# Patient Record
Sex: FEMALE | Race: WHITE | NOT HISPANIC OR LATINO | Employment: OTHER | URBAN - METROPOLITAN AREA
[De-identification: names, ages, dates, MRNs, and addresses within clinical notes are randomized per-mention and may not be internally consistent; named-entity substitution may affect disease eponyms.]

---

## 2017-02-04 DIAGNOSIS — I10 ESSENTIAL HYPERTENSION: ICD-10-CM

## 2017-02-04 DIAGNOSIS — E03.9 ACQUIRED HYPOTHYROIDISM: ICD-10-CM

## 2017-02-06 RX ORDER — LEVOTHYROXINE SODIUM 100 UG/1
TABLET ORAL
Qty: 90 TABLET | Refills: 0 | Status: SHIPPED | OUTPATIENT
Start: 2017-02-06 | End: 2017-02-08 | Stop reason: SDUPTHER

## 2017-02-06 RX ORDER — AMLODIPINE BESYLATE 5 MG/1
TABLET ORAL
Qty: 90 TABLET | Refills: 0 | Status: SHIPPED | OUTPATIENT
Start: 2017-02-06 | End: 2017-05-08 | Stop reason: SDUPTHER

## 2017-02-06 RX ORDER — LISINOPRIL AND HYDROCHLOROTHIAZIDE 20; 25 MG/1; MG/1
TABLET ORAL
Qty: 90 TABLET | Refills: 0 | Status: SHIPPED | OUTPATIENT
Start: 2017-02-06 | End: 2017-05-08 | Stop reason: SDUPTHER

## 2017-02-06 NOTE — TELEPHONE ENCOUNTER
Call patient and advise her she is due for 6 months fasting labs and then office visit now - please schedule appointments

## 2017-02-07 ENCOUNTER — TELEPHONE (OUTPATIENT)
Dept: FAMILY MEDICINE | Facility: CLINIC | Age: 57
End: 2017-02-07

## 2017-02-07 NOTE — TELEPHONE ENCOUNTER
----- Message from Fran Camargo sent at 2/6/2017  3:58 PM CST -----  Contact: self/[phone]  She needs direction to the [facility].

## 2017-02-08 ENCOUNTER — OFFICE VISIT (OUTPATIENT)
Dept: FAMILY MEDICINE | Facility: CLINIC | Age: 57
End: 2017-02-08
Payer: COMMERCIAL

## 2017-02-08 VITALS
OXYGEN SATURATION: 100 % | SYSTOLIC BLOOD PRESSURE: 110 MMHG | DIASTOLIC BLOOD PRESSURE: 72 MMHG | TEMPERATURE: 98 F | WEIGHT: 209 LBS | HEART RATE: 88 BPM | HEIGHT: 62 IN | BODY MASS INDEX: 38.46 KG/M2

## 2017-02-08 DIAGNOSIS — R73.01 IFG (IMPAIRED FASTING GLUCOSE): ICD-10-CM

## 2017-02-08 DIAGNOSIS — E03.9 ACQUIRED HYPOTHYROIDISM: ICD-10-CM

## 2017-02-08 DIAGNOSIS — E78.5 HYPERLIPIDEMIA, UNSPECIFIED HYPERLIPIDEMIA TYPE: ICD-10-CM

## 2017-02-08 DIAGNOSIS — R74.8 ELEVATED LIVER ENZYMES: ICD-10-CM

## 2017-02-08 DIAGNOSIS — I10 ESSENTIAL HYPERTENSION: Primary | ICD-10-CM

## 2017-02-08 PROCEDURE — 99214 OFFICE O/P EST MOD 30 MIN: CPT | Mod: S$GLB,,, | Performed by: NURSE PRACTITIONER

## 2017-02-08 PROCEDURE — 3074F SYST BP LT 130 MM HG: CPT | Mod: S$GLB,,, | Performed by: NURSE PRACTITIONER

## 2017-02-08 PROCEDURE — 3078F DIAST BP <80 MM HG: CPT | Mod: S$GLB,,, | Performed by: NURSE PRACTITIONER

## 2017-02-08 PROCEDURE — 99999 PR PBB SHADOW E&M-EST. PATIENT-LVL III: CPT | Mod: PBBFAC,,, | Performed by: NURSE PRACTITIONER

## 2017-02-08 RX ORDER — LEVOTHYROXINE SODIUM 112 UG/1
112 TABLET ORAL
Qty: 90 TABLET | Refills: 0 | Status: SHIPPED | OUTPATIENT
Start: 2017-02-08 | End: 2017-02-08 | Stop reason: SDUPTHER

## 2017-02-08 RX ORDER — LEVOTHYROXINE SODIUM 112 UG/1
112 TABLET ORAL
Qty: 90 TABLET | Refills: 0 | Status: SHIPPED | OUTPATIENT
Start: 2017-02-08 | End: 2017-05-08 | Stop reason: SDUPTHER

## 2017-02-08 NOTE — PROGRESS NOTES
"Subjective:       Patient ID: Digna Bhatia is a 56 y.o. female.    Chief Complaint: Follow-up (lab results)    HPI Comments: Patient is here today for follow up with lab results.    Patient has Hypertension that is controlled on present medications, Amlodipine and Lisinopril HCT.  /72 (BP Location: Right arm, Patient Position: Sitting, BP Method: Manual)  Pulse 88  Temp 97.9 °F (36.6 °C) (Oral)   Ht 5' 1.5" (1.562 m)  Wt 94.8 kg (208 lb 15.9 oz)  SpO2 100%  BMI 38.85 kg/m2    Patient has Hypothyroidism.  At last visit, we had increased the Synthroid from 75 to 100 mcg and her thyroid levels have improved as well as her cholesterol levels.  See results below.    Patient has IFG - FBG continues to rise now 132 but her HgbA1C and average daily sugars are not consistent with diabetes - last HgbA1C was 5.4 in July.  Body mass index is 38.85 kg/(m^2).  Consistent with Metabolic Syndrome.    Patient has always had an elevated total bilirubin in the past but rest of liver enzymes were fine so we continued to monitor but now the AST and ALT are very slightly elevated at 47 and 48.  Advised on no Tylenol and no Alcohol and weight loss in case this is secondary to fatty liver.  Will recheck in 3 months and if still rising, will need to do further liver workup.        Component      Latest Ref Rng & Units 2/7/2017 7/21/2016 9/30/2015   Sodium      136 - 145 mmol/L 140 137 138   Potassium      3.5 - 5.1 mmol/L 3.9 4.2 4.1   Chloride      95 - 110 mmol/L 100 102 103   CO2      23 - 29 mmol/L 29 24 26   Glucose      70 - 110 mg/dL 132 (H) 126 (H) 122 (H)   BUN, Bld      7 - 17 mg/dL 20 (H) 24 (H) 17   Creatinine      0.50 - 1.40 mg/dL 0.82 0.9 0.9   Calcium      8.7 - 10.5 mg/dL 9.4 9.7 9.5   Total Protein      6.0 - 8.4 g/dL 7.4 7.0 7.2   Albumin      3.5 - 5.2 g/dL 4.4 4.0 4.0   Total Bilirubin      0.1 - 1.0 mg/dL 1.5 (H) 1.6 (H) 1.5 (H)   Alkaline Phosphatase      38 - 126 U/L 89 85 83   AST      15 - 46 U/L 47 (H) " 24 29   ALT      10 - 44 U/L 48 (H) 35 46 (H)   Anion Gap      8 - 16 mmol/L 11 11 9   eGFR if African American      >60 mL/min/1.73 m:2 >60.0 >60.0 >60.0   eGFR if non African American      >60 mL/min/1.73 m:2 >60.0 >60.0 >60.0   Cholesterol      120 - 199 mg/dL 209 (H) 231 (H) 228 (H)   Triglycerides      30 - 150 mg/dL 91 112 102   HDL      40 - 75 mg/dL 52 53 53   LDL Cholesterol      63.0 - 159.0 mg/dL 138.8 155.6 154.6   HDL/Chol Ratio      20.0 - 50.0 % 24.9 22.9 23.2   Total Cholesterol/HDL Ratio      2.0 - 5.0 4.0 4.4 4.3   Non-HDL Cholesterol      mg/dL 157 178 175   Hemoglobin A1C      4.5 - 6.2 %  5.4 5.4   Estimated Avg Glucose      68 - 131 mg/dL  108 108   TSH      0.400 - 4.000 uIU/mL 2.700 5.770 (H) 2.494   Free T4      0.71 - 1.51 ng/dL 1.09 0.92 1.02     Previous Medications    AMLODIPINE (NORVASC) 5 MG TABLET    TAKE 1 TABLET DAILY    LEVOTHYROXINE (SYNTHROID) 100 MCG TABLET    TAKE 1 TABLET DAILY    LISINOPRIL-HYDROCHLOROTHIAZIDE (PRINZIDE,ZESTORETIC) 20-25 MG TAB    TAKE 1 TABLET DAILY       Past Medical History   Diagnosis Date    H/O mammogram 3/31/2016     has done at [facility]    Hypertension     Hypothyroidism     IFG (impaired fasting glucose)        Past Surgical History   Procedure Laterality Date     section      Dilation and curettage of uterus      Colonoscopy N/A 2016     Procedure: COLONOSCOPY-Miralax split prep ;  Surgeon: Cali Oliveira Jr., MD;  Location: [facility];  Service: Endoscopy;  Laterality: N/A;       Family History   Problem Relation Age of Onset    Dementia Mother     Parkinsonism Mother      PASSED AGE 78    Hypertension Father     Cancer Father      KIDNEY PASSED AGE 70    Heart disease Father 50     HEART ATTACK    Diabetes Father     Hypertension Sister     Diabetes Brother     Hypertension Brother     Diabetes Sister     Hypertension Sister     Hypertension Sister     Hypertension Brother     No Known Problems Daughter     No  "Known Problems Son     No Known Problems Son        Social History     Social History    Marital status:      Spouse name: N/A    Number of children: N/A    Years of education: N/A     Social History Main Topics    Smoking status: Never Smoker    Smokeless tobacco: Never Used    Alcohol use Yes      Comment: rare    Drug use: No    Sexual activity: Not Asked     Other Topics Concern    None     Social History Narrative       Review of Systems   Constitutional: Negative for appetite change, chills, fatigue, fever and unexpected weight change.   HENT: Negative for congestion, ear pain, mouth sores, nosebleeds, postnasal drip, rhinorrhea, sinus pressure, sneezing, sore throat, trouble swallowing and voice change.    Eyes: Negative for photophobia, pain, discharge, redness, itching and visual disturbance.   Respiratory: Negative for cough, chest tightness and shortness of breath.    Cardiovascular: Negative for chest pain, palpitations and leg swelling.   Gastrointestinal: Negative for abdominal pain, blood in stool, constipation, diarrhea, nausea and vomiting.   Genitourinary: Negative for dysuria, frequency, hematuria and urgency.   Musculoskeletal: Negative for arthralgias, back pain, joint swelling and myalgias.   Skin: Negative for color change and rash.   Allergic/Immunologic: Negative for immunocompromised state.   Neurological: Negative for dizziness, seizures, syncope, weakness and headaches.   Hematological: Negative for adenopathy. Does not bruise/bleed easily.   Psychiatric/Behavioral: Negative for agitation, dysphoric mood, sleep disturbance and suicidal ideas. The patient is not nervous/anxious.          Objective:     Vitals:    02/08/17 1610   BP: 110/72   BP Location: Right arm   Patient Position: Sitting   BP Method: Manual   Pulse: 88   Temp: 97.9 °F (36.6 °C)   TempSrc: Oral   SpO2: 100%   Weight: 94.8 kg (208 lb 15.9 oz)   Height: 5' 1.5" (1.562 m)          Physical Exam "   Constitutional: She is oriented to person, place, and time. She appears well-developed and well-nourished.   + obesity. Body mass index is 38.85 kg/(m^2).     HENT:   Head: Normocephalic and atraumatic.   Right Ear: External ear normal.   Left Ear: External ear normal.   Nose: Nose normal.   Mouth/Throat: Oropharynx is clear and moist. No oropharyngeal exudate.   Eyes: EOM are normal. Pupils are equal, round, and reactive to light.   Neck: Normal range of motion. Neck supple. No tracheal deviation present. No thyromegaly present.   Cardiovascular: Normal rate, regular rhythm and normal heart sounds.    No murmur heard.  Pulmonary/Chest: Effort normal and breath sounds normal. No respiratory distress.   Abdominal: Soft. She exhibits no distension.   Musculoskeletal: Normal range of motion. She exhibits no edema.   Lymphadenopathy:     She has no cervical adenopathy.   Neurological: She is alert and oriented to person, place, and time. No cranial nerve deficit. Coordination normal.   Skin: Skin is warm and dry. No rash noted.   Psychiatric: She has a normal mood and affect.         Assessment:         ICD-10-CM ICD-9-CM   1. Essential hypertension I10 401.9   2. Hyperlipidemia, unspecified hyperlipidemia type E78.5 272.4   3. Acquired hypothyroidism E03.9 244.9   4. IFG (impaired fasting glucose) R73.01 790.21   5. Elevated liver enzymes R74.8 790.5       Plan:       Essential hypertension  -  Controlled on present medications.  Recheck in 3 months.  -     Comprehensive metabolic panel; Future; Expected date: 5/1/17    Hyperlipidemia, unspecified hyperlipidemia type  -  Improved from last visit.  Suspect improvement secondary to better thyroid control.  Stricter lifestyle modifications.  Need weight loss.  Suggested program like Weight Watchers.  Recheck levels in 3 months.  -     Lipid panel; Future; Expected date: 5/1/17    Acquired hypothyroidism  -  Increase the Synthroid from 100 to 112 mcg daily for a little  bit better control even though numbers are definitely improved.  Recheck in 3 months.  -     TSH; Future; Expected date: 5/1/17  -     T4, free; Future; Expected date: 5/1/17  -     levothyroxine (SYNTHROID) 112 MCG tablet; Take 1 tablet (112 mcg total) by mouth before breakfast.  Dispense: 90 tablet; Refill: 0    IFG (impaired fasting glucose)  -  Must lose weight and stricter lifestyle modifications.  -     Hemoglobin A1c; Future; Expected date: 5/1/17    Elevated liver enzymes  -  Stop tylenol and alcohol.  Must lose weight - suspect fatty liver.  Will monitor and recheck in 3 months - if levels continue to rise - will need further liver evaluation.  -     Hepatitis panel, acute; Future; Expected date: 2/9/17    Return in about 3 months (around 5/8/2017) for fasting labs and then office visit.     Patient's Medications   New Prescriptions    No medications on file   Previous Medications    AMLODIPINE (NORVASC) 5 MG TABLET    TAKE 1 TABLET DAILY    LISINOPRIL-HYDROCHLOROTHIAZIDE (PRINZIDE,ZESTORETIC) 20-25 MG TAB    TAKE 1 TABLET DAILY   Modified Medications    Modified Medication Previous Medication    LEVOTHYROXINE (SYNTHROID) 112 MCG TABLET levothyroxine (SYNTHROID) 100 MCG tablet       Take 1 tablet (112 mcg total) by mouth before breakfast.    TAKE 1 TABLET DAILY   Discontinued Medications    FLUTICASONE (FLONASE) 50 MCG/ACTUATION NASAL SPRAY    2 sprays by Each Nare route once daily.    LEVOCETIRIZINE (XYZAL) 5 MG TABLET    Take 1 tablet (5 mg total) by mouth every evening.

## 2017-02-08 NOTE — MR AVS SNAPSHOT
[facility]  [address] [place] [address]  [place] [address]  Phone: [phone]  Fax: [phone]                  Digna Bhatia   2017 4:00 PM   Office Visit    Description:  Female : 1960   Provider:  Kymberly Barba NP   Department:  [facility]           Reason for Visit     Follow-up           Diagnoses this Visit        Comments    Essential hypertension    -  Primary     Hyperlipidemia, unspecified hyperlipidemia type         Acquired hypothyroidism         IFG (impaired fasting glucose)                To Do List           Future Appointments        Provider Department Dept Phone    3/2/2017 11:00 AM Nikky Araiza MD [facility] [phone]      Goals (5 Years of Data)     None      Follow-Up and Disposition     Return in about 3 months (around 2017) for fasting labs and then office visit.       These Medications        Disp Refills Start End    levothyroxine (SYNTHROID) 112 MCG tablet 90 tablet 0 2017     Take 1 tablet (112 mcg total) by mouth before breakfast. - Oral    Pharmacy: [facility] - [address] #: [phone]         [facility]     [facility] On Call Nurse Care Line -  Assistance  Registered nurses in the [facility] On Call Center provide clinical advisement, health education, appointment booking, and other advisory services.  Call for this free service at [phone].             Medications           Message regarding Medications     Verify the changes and/or additions to your medication regime listed below are the same as discussed with your clinician today.  If any of these changes or additions are incorrect, please notify your healthcare provider.        CHANGE how you are taking these medications     Start Taking Instead of    levothyroxine (SYNTHROID) 112 MCG tablet levothyroxine (SYNTHROID) 100 MCG tablet    Dosage:  Take 1 tablet (112 mcg total) by mouth before  "breakfast. Dosage:  TAKE 1 TABLET DAILY    Reason for Change:  Reorder       STOP taking these medications     fluticasone (FLONASE) 50 mcg/actuation nasal spray 2 sprays by Each Nare route once daily.    levocetirizine (XYZAL) 5 MG tablet Take 1 tablet (5 mg total) by mouth every evening.           Verify that the below list of medications is an accurate representation of the medications you are currently taking.  If none reported, the list may be blank. If incorrect, please contact your healthcare provider. Carry this list with you in case of emergency.           Current Medications     amlodipine (NORVASC) 5 MG tablet TAKE 1 TABLET DAILY    levothyroxine (SYNTHROID) 112 MCG tablet Take 1 tablet (112 mcg total) by mouth before breakfast.    lisinopril-hydrochlorothiazide (PRINZIDE,ZESTORETIC) 20-25 mg Tab TAKE 1 TABLET DAILY           Clinical Reference Information           Your Vitals Were     BP Pulse Temp Height    110/72 (BP Location: Right arm, Patient Position: Sitting, BP Method: Manual) 88 97.9 °F (36.6 °C) (Oral) 5' 1.5" (1.562 m)    Weight SpO2 BMI    94.8 kg (208 lb 15.9 oz) 100% 38.85 kg/m2      Blood Pressure          Most Recent Value    BP  110/72      Allergies as of 2/8/2017     Bactrim [Sulfamethoxazole-trimethoprim]    Pcn [Penicillins]      Immunizations Administered on Date of Encounter - 2/8/2017     None      Orders Placed During Today's Visit     Future Labs/Procedures Expected by Expires    Comprehensive metabolic panel  5/1/2017 4/9/2018    Hemoglobin A1c  5/1/2017 4/9/2018    Lipid panel  5/1/2017 4/9/2018    T4, free  5/1/2017 4/9/2018    TSH  5/1/2017 4/9/2018      [facility] Sign-Up     Activating your [facility] account is as easy as 1-2-3!     1) Visit [email or web address], select Sign Up Now, enter this activation code and your date of birth, then select Next.  [email or web address]  Expires: 3/25/2017  4:48 PM      2) Create a username and password to use when you visit [facility] in the " future and select a security question in case you lose your password and select Next.    3) Enter your e-mail address and click Sign Up!    Additional Information  If you have questions, please e-mail [email or web address] or call [phone] to talk to our [facility] staff. Remember, [facility] is NOT to be used for urgent needs. For medical emergencies, dial 911.         Language Assistance Services     ATTENTION: Language assistance services are available, free of charge. Please call [phone].      ATENCIÓN: Si habla español, tiene a arreola disposición servicios gratuitos de asistencia lingüística. Llame al [phone].     CHÚ Ý: N?u b?n nói Ti?ng Vi?t, có các d?ch v? h? tr? ngôn ng? mi?n phí dành cho b?n. G?i s? [phone].         [facility] complies with applicable Federal civil rights laws and does not discriminate on the basis of race, color, national origin, age, disability, or sex.

## 2017-03-02 ENCOUNTER — OFFICE VISIT (OUTPATIENT)
Dept: OBSTETRICS AND GYNECOLOGY | Facility: CLINIC | Age: 57
End: 2017-03-02
Payer: COMMERCIAL

## 2017-03-02 VITALS
SYSTOLIC BLOOD PRESSURE: 130 MMHG | HEIGHT: 61 IN | WEIGHT: 203.5 LBS | DIASTOLIC BLOOD PRESSURE: 64 MMHG | BODY MASS INDEX: 38.42 KG/M2

## 2017-03-02 DIAGNOSIS — Z01.419 WELL WOMAN EXAM WITH ROUTINE GYNECOLOGICAL EXAM: Primary | ICD-10-CM

## 2017-03-02 DIAGNOSIS — Z12.4 SCREENING FOR CERVICAL CANCER: ICD-10-CM

## 2017-03-02 PROCEDURE — 88175 CYTOPATH C/V AUTO FLUID REDO: CPT

## 2017-03-02 PROCEDURE — 99999 PR PBB SHADOW E&M-EST. PATIENT-LVL III: CPT | Mod: PBBFAC,,, | Performed by: OBSTETRICS & GYNECOLOGY

## 2017-03-02 PROCEDURE — 3078F DIAST BP <80 MM HG: CPT | Mod: S$GLB,,, | Performed by: OBSTETRICS & GYNECOLOGY

## 2017-03-02 PROCEDURE — 3075F SYST BP GE 130 - 139MM HG: CPT | Mod: S$GLB,,, | Performed by: OBSTETRICS & GYNECOLOGY

## 2017-03-02 PROCEDURE — 99386 PREV VISIT NEW AGE 40-64: CPT | Mod: S$GLB,,, | Performed by: OBSTETRICS & GYNECOLOGY

## 2017-03-02 PROCEDURE — 87624 HPV HI-RISK TYP POOLED RSLT: CPT

## 2017-03-02 NOTE — PROGRESS NOTES
GYNECOLOGY OFFICE NOTE    Reason for visit: annual    HPI: Pt is a 56 y.o.  female  who presents for annual. Menopausal since age 49. Cycle: menarche- 12. She is sexually active. She does not desire STI screening. She denies vaginal discharge.  Last pap: unknown, denies hx of abnormal. Last MMG - negative.     Past Medical History:   Diagnosis Date    H/O mammogram 3/31/2016    [facility]    Hypertension     Hypothyroidism     IFG (impaired fasting glucose)        Past Surgical History:   Procedure Laterality Date     SECTION      COLONOSCOPY N/A 2016    Procedure: COLONOSCOPY-Miralax split prep ;  Surgeon: Cali Oliveira Jr., MD;  [facility];  Service: Endoscopy;  Laterality: N/A;    DILATION AND CURETTAGE OF UTERUS         Family History   Problem Relation Age of Onset    Dementia Mother     Parkinsonism Mother      PASSED AGE 78    Hypertension Father     Cancer Father      KIDNEY PASSED AGE 70    Heart disease Father 50     HEART ATTACK    Diabetes Father     Hypertension Sister     Diabetes Brother     Hypertension Brother     Diabetes Sister     Hypertension Sister     Hypertension Sister     Hypertension Brother     No Known Problems Daughter     No Known Problems Son     No Known Problems Son        Social History   Substance Use Topics    Smoking status: Never Smoker    Smokeless tobacco: Never Used    Alcohol use Yes      Comment: rare       OB History    Para Term  AB SAB TAB Ectopic Multiple Living   6 3 1 2 3 3    3      # Outcome Date GA Lbr Gerard/2nd Weight Sex Delivery Anes PTL Lv   6 SAB            5 SAB            4 SAB            3 Term      CS-Unspec   Y   2       CS-Unspec  Y Y   1       CS-Unspec  Y Y          Current Outpatient Prescriptions   Medication Sig    amlodipine (NORVASC) 5 MG tablet TAKE 1 TABLET DAILY    levothyroxine (SYNTHROID) 112 MCG tablet Take 1 tablet (112 mcg total) by  "mouth before breakfast.    lisinopril-hydrochlorothiazide (PRINZIDE,ZESTORETIC) 20-25 mg Tab TAKE 1 TABLET DAILY     No current facility-administered medications for this visit.        Allergies: Bactrim [sulfamethoxazole-trimethoprim] and Pcn [penicillins]     /64  Ht 5' 1" (1.549 m)  Wt 92.3 kg (203 lb 7.8 oz)  BMI 38.45 kg/m2    ROS:  GENERAL: Denies fever or chills.   SKIN: Denies rash or lesions.   HEAD: Denies head injury or headache.   CHEST: Denies chest pain or shortness of breath.   CARDIOVASCULAR: Denies palpitations or chest pain.   ABDOMEN: No abdominal pain, constipation, diarrhea, nausea, vomiting or rectal bleeding.   URINARY: No dysuria, hematuria, or burning on urination.  REPRODUCTIVE: See HPI.   BREASTS: Denies pain, lumps, or nipple discharge.   NEUROLOGIC: Denies syncope or weakness.     Physical Exam:  GENERAL: alert, appears stated age and cooperative  CHEST: Normal respiratory effort  HEART: S1 and S2 normal, regular rate and rhythm  NECK: normal appearance, no thyromegaly masses or tenderness  SKIN: no acne, striae, hirsutism  BREAST EXAM: breasts appear normal, no suspicious masses, no skin or nipple changes or axillary nodes  ABDOMEN: abdomen is soft without significant tenderness, masses, organomegaly or guarding, no hernias noted  EXTERNAL GENITALIA:  normal general appearance  URETHRA: normal urethra, normal urethral meatus  VAGINA:  normal mucosa without prolapse or lesions  CERVIX:  Normal  UTERUS:  normal size, mobile, non-tender, normal shape and consistency  ADNEXA:  normal adnexa in size, nontender and no masses    Diagnosis:  1. Well woman exam with routine gynecological exam    2. Screening for cervical cancer        Plan:   1. Annual  2. pap/hpv    Orders Placed This Encounter    HPV DNA probe, amplified    Liquid-based pap smear, screening       Patient was counseled today on the new ACS guidelines for cervical cytology screening as well as the current " recommendations for breast cancer screening. She was counseled to follow up with her PCP for other routine health maintenance.     Return in about 1 year (around 3/2/2018) for annual.      Nikky Araiza MD  OB/GYN  Pager: [phone]

## 2017-03-02 NOTE — MR AVS SNAPSHOT
[facility]  [address] [place] [address] [address]  [place] [address]  Phone: [phone]                  Digna Jeans   3/2/2017 11:00 AM   Office Visit    Description:  Female : 1960   Provider:  Nikky Araiza MD   Department:  [facility]           Reason for Visit     Annual Exam           Diagnoses this Visit        Comments    Well woman exam with routine gynecological exam    -  Primary     Screening for cervical cancer                To Do List           Goals (5 Years of Data)     None      Follow-Up and Disposition     Return in about 1 year (around 3/2/2018) for annual.    Follow-up and Disposition History      [facility]     [facility] Care [facility] -  Assistance  Registered nurses in the [facility] Center provide clinical advisement, health education, appointment booking, and other advisory services.  Call for this free service at [phone].             Medications           Message regarding Medications     Verify the changes and/or additions to your medication regime listed below are the same as discussed with your clinician today.  If any of these changes or additions are incorrect, please notify your healthcare provider.             Verify that the below list of medications is an accurate representation of the medications you are currently taking.  If none reported, the list may be blank. If incorrect, please contact your healthcare provider. Carry this list with you in case of emergency.           Current Medications     amlodipine (NORVASC) 5 MG tablet TAKE 1 TABLET DAILY    levothyroxine (SYNTHROID) 112 MCG tablet Take 1 tablet (112 mcg total) by mouth before breakfast.    lisinopril-hydrochlorothiazide (PRINZIDE,ZESTORETIC) 20-25 mg Tab TAKE 1 TABLET DAILY           Clinical Reference Information           Your Vitals Were     BP                   130/64           Blood Pressure          Most Recent Value    BP  130/64      Allergies as of  3/2/2017     Bactrim [Sulfamethoxazole-trimethoprim]    Pcn [Penicillins]      Immunizations Administered on Date of Encounter - 3/2/2017     None      Orders Placed During Today's Visit      Normal Orders This Visit    HPV DNA probe, amplified     Liquid-based pap smear, screening       [facility] Sign-Up     Activating your [facility] account is as easy as 1-2-3!     1) Visit [email or web address], select Sign Up Now, enter this activation code and your date of birth, then select Next.  [email or web address]  Expires: 3/25/2017  4:48 PM      2) Create a username and password to use when you visit [facility] in the future and select a security question in case you lose your password and select Next.    3) Enter your e-mail address and click Sign Up!    Additional Information  If you have questions, please e-mail [email or web address] or call [phone] to talk to our [facility] staff. Remember, [facility] is NOT to be used for urgent needs. For medical emergencies, dial 911.         Language Assistance Services     ATTENTION: Language assistance services are available, free of charge. Please call [phone].      ATENCIÓN: Si habla español, tiene a arreola disposición servicios gratuitos de asistencia lingüística. Llame al [phone].     CHÚ Ý: N?u b?n nói Ti?ng Vi?t, có các d?ch v? h? tr? ngôn ng? mi?n phí dành cho b?n. G?i s? [phone].         [facility] complies with applicable Federal civil rights laws and does not discriminate on the basis of race, color, national origin, age, disability, or sex.

## 2017-03-10 LAB — HUMAN PAPILLOMAVIRUS (HPV): NOT DETECTED

## 2017-03-13 ENCOUNTER — TELEPHONE (OUTPATIENT)
Dept: OBSTETRICS AND GYNECOLOGY | Facility: CLINIC | Age: 57
End: 2017-03-13

## 2017-04-21 ENCOUNTER — TELEPHONE (OUTPATIENT)
Dept: FAMILY MEDICINE | Facility: CLINIC | Age: 57
End: 2017-04-21

## 2017-04-21 ENCOUNTER — PATIENT MESSAGE (OUTPATIENT)
Dept: FAMILY MEDICINE | Facility: CLINIC | Age: 57
End: 2017-04-21

## 2017-04-21 DIAGNOSIS — Z12.39 BREAST CANCER SCREENING: Primary | ICD-10-CM

## 2017-04-21 NOTE — TELEPHONE ENCOUNTER
----- Message from Luzmaria Bates sent at 4/20/2017  4:55 PM CDT -----  Contact: [phone]/self  Patient requesting orders for a mammogram by 04/21/2017. Please advise.

## 2017-04-21 NOTE — TELEPHONE ENCOUNTER
----- Message from Jerica Fermin sent at 4/21/2017 12:13 PM CDT -----  Contact: Self [phone]  Patient is calling to get orders faxed to [phone]

## 2017-05-08 ENCOUNTER — TELEPHONE (OUTPATIENT)
Dept: FAMILY MEDICINE | Facility: CLINIC | Age: 57
End: 2017-05-08

## 2017-05-08 ENCOUNTER — OFFICE VISIT (OUTPATIENT)
Dept: FAMILY MEDICINE | Facility: CLINIC | Age: 57
End: 2017-05-08
Payer: COMMERCIAL

## 2017-05-08 VITALS
WEIGHT: 207.25 LBS | DIASTOLIC BLOOD PRESSURE: 60 MMHG | HEIGHT: 61 IN | OXYGEN SATURATION: 97 % | BODY MASS INDEX: 39.13 KG/M2 | SYSTOLIC BLOOD PRESSURE: 112 MMHG | TEMPERATURE: 98 F | HEART RATE: 92 BPM

## 2017-05-08 DIAGNOSIS — E03.9 ACQUIRED HYPOTHYROIDISM: ICD-10-CM

## 2017-05-08 DIAGNOSIS — R74.8 ELEVATED LIVER ENZYMES: ICD-10-CM

## 2017-05-08 DIAGNOSIS — E78.5 HYPERLIPIDEMIA, UNSPECIFIED HYPERLIPIDEMIA TYPE: ICD-10-CM

## 2017-05-08 DIAGNOSIS — G47.33 OSA (OBSTRUCTIVE SLEEP APNEA): ICD-10-CM

## 2017-05-08 DIAGNOSIS — I10 ESSENTIAL HYPERTENSION: Primary | ICD-10-CM

## 2017-05-08 DIAGNOSIS — R73.01 IFG (IMPAIRED FASTING GLUCOSE): ICD-10-CM

## 2017-05-08 PROCEDURE — 3078F DIAST BP <80 MM HG: CPT | Mod: S$GLB,,, | Performed by: NURSE PRACTITIONER

## 2017-05-08 PROCEDURE — 99214 OFFICE O/P EST MOD 30 MIN: CPT | Mod: S$GLB,,, | Performed by: NURSE PRACTITIONER

## 2017-05-08 PROCEDURE — 3074F SYST BP LT 130 MM HG: CPT | Mod: S$GLB,,, | Performed by: NURSE PRACTITIONER

## 2017-05-08 PROCEDURE — 99999 PR PBB SHADOW E&M-EST. PATIENT-LVL IV: CPT | Mod: PBBFAC,,, | Performed by: NURSE PRACTITIONER

## 2017-05-08 PROCEDURE — 1160F RVW MEDS BY RX/DR IN RCRD: CPT | Mod: S$GLB,,, | Performed by: NURSE PRACTITIONER

## 2017-05-08 RX ORDER — LEVOTHYROXINE SODIUM 112 UG/1
112 TABLET ORAL
Qty: 90 TABLET | Refills: 0 | Status: SHIPPED | OUTPATIENT
Start: 2017-05-08 | End: 2017-08-01 | Stop reason: SDUPTHER

## 2017-05-08 RX ORDER — AMLODIPINE BESYLATE 5 MG/1
5 TABLET ORAL DAILY
Qty: 90 TABLET | Refills: 0 | Status: SHIPPED | OUTPATIENT
Start: 2017-05-08 | End: 2017-08-01 | Stop reason: SDUPTHER

## 2017-05-08 RX ORDER — LISINOPRIL AND HYDROCHLOROTHIAZIDE 20; 25 MG/1; MG/1
1 TABLET ORAL DAILY
Qty: 90 TABLET | Refills: 0 | Status: SHIPPED | OUTPATIENT
Start: 2017-05-08 | End: 2017-08-01 | Stop reason: SDUPTHER

## 2017-05-08 NOTE — TELEPHONE ENCOUNTER
----- Message from Lisa Tran sent at 5/8/2017 12:20 PM CDT -----  Contact: [phone]  Patient would like to know of her test results are in before she comes in for her appt.

## 2017-05-08 NOTE — TELEPHONE ENCOUNTER
Called pt no answer left voice mail that pt test results are in and will see pt today for appointment.

## 2017-05-08 NOTE — MR AVS SNAPSHOT
[address]  [place] [address]  Phone: [phone]  Fax: [phone]                  Digna Bhatia   2017 3:20 PM   Office Visit    Description:  Female : 1960   Provider:  Kymberly Barba NP   Department:  [facility]           Reason for Visit     Follow-up           Diagnoses this Visit        Comments    Essential hypertension    -  Primary     Hyperlipidemia, unspecified hyperlipidemia type         Acquired hypothyroidism         IFG (impaired fasting glucose)         KELLY (obstructive sleep apnea)         Elevated liver enzymes                To Do List           Goals (5 Years of Data)     None      Follow-Up and Disposition     Return in about 3 months (around 2017) for fasting labs and office visit.       These Medications        Disp Refills Start End    levothyroxine (SYNTHROID) 112 MCG tablet 90 tablet 0 2017     Take 1 tablet (112 mcg total) by mouth before breakfast. - Oral    Pharmacy: [facility] - [address] Ph #: [phone]       amlodipine (NORVASC) 5 MG tablet 90 tablet 0 2017     Take 1 tablet (5 mg total) by mouth once daily. - Oral    Pharmacy: [facility] - [address] Ph #: [phone]       lisinopril-hydrochlorothiazide (PRINZIDE,ZESTORETIC) 20-25 mg Tab 90 tablet 0 2017     Take 1 tablet by mouth once daily. - Oral    Pharmacy: [facility] - [address] Ph #: [phone]         [facility]     [facility] - / Assistance  Unless otherwise directed by your provider, please contact [facility], our nurse care line that is available for / assistance.     Registered nurses in the [facility] provide: appointment scheduling, clinical advisement, health education, and other advisory services.  Call: [phone] (toll  "free)               Medications           Message regarding Medications     Verify the changes and/or additions to your medication regime listed below are the same as discussed with your clinician today.  If any of these changes or additions are incorrect, please notify your healthcare provider.        CHANGE how you are taking these medications     Start Taking Instead of    amlodipine (NORVASC) 5 MG tablet amlodipine (NORVASC) 5 MG tablet    Dosage:  Take 1 tablet (5 mg total) by mouth once daily. Dosage:  TAKE 1 TABLET DAILY    Reason for Change:  Reorder     lisinopril-hydrochlorothiazide (PRINZIDE,ZESTORETIC) 20-25 mg Tab lisinopril-hydrochlorothiazide (PRINZIDE,ZESTORETIC) 20-25 mg Tab    Dosage:  Take 1 tablet by mouth once daily. Dosage:  TAKE 1 TABLET DAILY    Reason for Change:  Reorder            Verify that the below list of medications is an accurate representation of the medications you are currently taking.  If none reported, the list may be blank. If incorrect, please contact your healthcare provider. Carry this list with you in case of emergency.           Current Medications     amlodipine (NORVASC) 5 MG tablet Take 1 tablet (5 mg total) by mouth once daily.    levothyroxine (SYNTHROID) 112 MCG tablet Take 1 tablet (112 mcg total) by mouth before breakfast.    lisinopril-hydrochlorothiazide (PRINZIDE,ZESTORETIC) 20-25 mg Tab Take 1 tablet by mouth once daily.           Clinical Reference Information           Your Vitals Were     BP Pulse Temp Height Weight SpO2    112/60 (BP Location: Left arm, Patient Position: Sitting, BP Method: Manual) 92 98.1 °F (36.7 °C) (Oral) 5' 1" (1.549 m) 94 kg (207 lb 3.7 oz) 97%    BMI                39.16 kg/m2          Blood Pressure          Most Recent Value    BP  112/60      Allergies as of 5/8/2017     Bactrim [Sulfamethoxazole-trimethoprim]    Pcn [Penicillins]      Immunizations Administered on Date of Encounter - 5/8/2017     None      Orders Placed During " Today's Visit      Normal Orders This Visit    Ambulatory consult to Sleep Disorders     Ambulatory Referral to Gastroenterology       Language Assistance Services     ATTENTION: Language assistance services are available, free of charge. Please call [phone].      ATENCIÓN: Si joanne laguna, tiene a arreola disposición servicios gratuitos de asistencia lingüística. Llame al [phone].     ANALI Ý: N?u b?n nói Ti?ng Vi?t, có các d?ch v? h? tr? ngôn ng? mi?n phí dành cho b?n. G?i s? [phone].         [facility] complies with applicable Federal civil rights laws and does not discriminate on the basis of race, color, national origin, age, disability, or sex.

## 2017-05-08 NOTE — PROGRESS NOTES
"Subjective:       Patient ID: Digna Bhatia is a 56 y.o. female.    Chief Complaint: Follow-up (lab results)    HPI Comments: Patient has Hypertension that is controlled on present medications, Amlodipine and Lisinopril HCT.  /60 (BP Location: Left arm, Patient Position: Sitting, BP Method: Manual)  Pulse 92  Temp 98.1 °F (36.7 °C) (Oral)   Ht 5' 1" (1.549 m)  Wt 94 kg (207 lb 3.7 oz)  SpO2 97%  BMI 39.16 kg/m2    Patient has Hypothyroidism. At last visit, we had increased the Synthroid from 100 to 112 mcg and her thyroid levels did not change.  Through further questioning, found out patient was taking thyroid medication at same time as blood pressure medication. See results below.     Patient has IFG - FBG now 128 but her HgbA1C and average daily sugars are not consistent with diabetes - last HgbA1C was 5.5 with average glucose 111. Body mass index is 39.16 kg/(m^2).     Patient has always had an elevated total bilirubin in the past but rest of liver enzymes were fine so we continued to monitor but in Feb., the AST and ALT are were slightly elevated at 47 and 48. Now bilirubin continues to climb 1.8 with ALT 51.   Advised on no Tylenol and no Alcohol and weight loss in case this is secondary to fatty liver. Will send to GI for further evaluation to rule out autoimmune or other liver disease.  Hepatitis panel was negative.       Patient reports loud snoring with witnessed apnea.  She reports she had a sleep study done multiple years ago but could not get used to CPAP machine.  Reports symptoms are worsening.  Testing was definitely over 5 years ago - will try to obtain record but will also refer to sleep medicine for further evaluation.        Component      Latest Ref Rng & Units 5/5/2017 2/7/2017 7/21/2016   Sodium      136 - 145 mmol/L 139 140 137   Potassium      3.5 - 5.1 mmol/L 4.0 3.9 4.2   Chloride      95 - 110 mmol/L 106 100 102   CO2      23 - 29 mmol/L 25 29 24   Glucose      70 - 110 " mg/dL 128 (H) 132 (H) 126 (H)   BUN, Bld      7 - 17 mg/dL 18 (H) 20 (H) 24 (H)   Creatinine      0.50 - 1.40 mg/dL 0.81 0.82 0.9   Calcium      8.7 - 10.5 mg/dL 9.3 9.4 9.7   Total Protein      6.0 - 8.4 g/dL 7.3 7.4 7.0   Albumin      3.5 - 5.2 g/dL 4.3 4.4 4.0   Total Bilirubin      0.1 - 1.0 mg/dL 1.8 (H) 1.5 (H) 1.6 (H)   Alkaline Phosphatase      38 - 126 U/L 84 89 85   AST      15 - 46 U/L 28 47 (H) 24   ALT      10 - 44 U/L 51 (H) 48 (H) 35   Anion Gap      8 - 16 mmol/L 8 11 11   eGFR if African American      >60 mL/min/1.73 m:2 >60.0 >60.0 >60.0   eGFR if non African American      >60 mL/min/1.73 m:2 >60.0 >60.0 >60.0   Cholesterol      120 - 199 mg/dL 226 (H) 209 (H) 231 (H)   Triglycerides      30 - 150 mg/dL 107 91 112   HDL      40 - 75 mg/dL 49 52 53   LDL Cholesterol      63.0 - 159.0 mg/dL 155.6 138.8 155.6   HDL/Chol Ratio      20.0 - 50.0 % 21.7 24.9 22.9   Total Cholesterol/HDL Ratio      2.0 - 5.0 4.6 4.0 4.4   Non-HDL Cholesterol      mg/dL 177 157 178   Hepatitis B Surface Ag       Negative     Hep B C IgM       Negative     Hep A IgM       Negative     Hepatitis C Ab       Negative  Negative   Hemoglobin A1C      4.5 - 6.2 % 5.5  5.4   Estimated Avg Glucose      68 - 131 mg/dL 111  108   TSH      0.400 - 4.000 uIU/mL 2.650 2.700 5.770 (H)   Free T4      0.71 - 1.51 ng/dL 1.09 1.09 0.92       Previous Medications    AMLODIPINE (NORVASC) 5 MG TABLET    TAKE 1 TABLET DAILY    LEVOTHYROXINE (SYNTHROID) 112 MCG TABLET    Take 1 tablet (112 mcg total) by mouth before breakfast.    LISINOPRIL-HYDROCHLOROTHIAZIDE (PRINZIDE,ZESTORETIC) 20-25 MG TAB    TAKE 1 TABLET DAILY       Past Medical History:   Diagnosis Date    H/O mammogram 3/31/2016    [facility] done [facility]    Hypertension     Hypothyroidism     IFG (impaired fasting glucose)     KELLY (obstructive sleep apnea)     did not tolerate any of the masks =        Past Surgical History:   Procedure Laterality Date     SECTION      COLONOSCOPY  N/A 7/29/2016    Procedure: COLONOSCOPY-Miralax split prep ;  Surgeon: Cali Oliveira Jr., MD;  Location[facility];  Service: Endoscopy;  Laterality: N/A;    DILATION AND CURETTAGE OF UTERUS         Family History   Problem Relation Age of Onset    Dementia Mother     Parkinsonism Mother      PASSED AGE 78    Hypertension Father     Cancer Father      KIDNEY PASSED AGE 70    Heart disease Father 50     HEART ATTACK    Diabetes Father     Hypertension Sister     Diabetes Brother     Hypertension Brother     Diabetes Sister     Hypertension Sister     Hypertension Sister     Hypertension Brother     No Known Problems Daughter     No Known Problems Son     No Known Problems Son        Social History     Social History    Marital status:      Spouse name: N/A    Number of children: N/A    Years of education: N/A     Social History Main Topics    Smoking status: Never Smoker    Smokeless tobacco: Never Used    Alcohol use Yes      Comment: rare    Drug use: No    Sexual activity: Not Asked     Other Topics Concern    None     Social History Narrative    None       Review of Systems   Constitutional: Negative for appetite change, chills, fatigue, fever and unexpected weight change.   HENT: Negative for congestion, ear pain, mouth sores, nosebleeds, postnasal drip, rhinorrhea, sinus pressure, sneezing, sore throat, trouble swallowing and voice change.    Eyes: Negative for photophobia, pain, discharge, redness, itching and visual disturbance.   Respiratory: Negative for cough, chest tightness and shortness of breath.    Cardiovascular: Negative for chest pain, palpitations and leg swelling.   Gastrointestinal: Negative for abdominal pain, blood in stool, constipation, diarrhea, nausea and vomiting.   Genitourinary: Negative for dysuria, frequency, hematuria and urgency.   Musculoskeletal: Negative for arthralgias, back pain, joint swelling and myalgias.   Skin: Negative for color  "change and rash.   Allergic/Immunologic: Negative for immunocompromised state.   Neurological: Negative for dizziness, seizures, syncope, weakness and headaches.   Hematological: Negative for adenopathy. Does not bruise/bleed easily.   Psychiatric/Behavioral: Negative for agitation, dysphoric mood, sleep disturbance and suicidal ideas. The patient is not nervous/anxious.          Objective:     Vitals:    05/08/17 1547   BP: 112/60   BP Location: Left arm   Patient Position: Sitting   BP Method: Manual   Pulse: 92   Temp: 98.1 °F (36.7 °C)   TempSrc: Oral   SpO2: 97%   Weight: 94 kg (207 lb 3.7 oz)   Height: 5' 1" (1.549 m)          Physical Exam   Constitutional: She is oriented to person, place, and time. She appears well-developed and well-nourished.   + obesity. Body mass index is 39.16 kg/(m^2).       HENT:   Head: Normocephalic and atraumatic.   Right Ear: External ear normal.   Left Ear: External ear normal.   Nose: Nose normal.   Mouth/Throat: Oropharynx is clear and moist. No oropharyngeal exudate.   Eyes: EOM are normal. Pupils are equal, round, and reactive to light.   Neck: Normal range of motion. Neck supple. No tracheal deviation present. No thyromegaly present.   Cardiovascular: Normal rate, regular rhythm and normal heart sounds.    No murmur heard.  Pulmonary/Chest: Effort normal and breath sounds normal. No respiratory distress.   Abdominal: Soft. She exhibits no distension.   Musculoskeletal: Normal range of motion. She exhibits no edema.   Lymphadenopathy:     She has no cervical adenopathy.   Neurological: She is alert and oriented to person, place, and time. No cranial nerve deficit. Coordination normal.   Skin: Skin is warm and dry. No rash noted.   Psychiatric: She has a normal mood and affect.         Assessment:         ICD-10-CM ICD-9-CM   1. Essential hypertension I10 401.9   2. Hyperlipidemia, unspecified hyperlipidemia type E78.5 272.4   3. Acquired hypothyroidism E03.9 244.9   4. IFG " (impaired fasting glucose) R73.01 790.21   5. KELLY (obstructive sleep apnea) G47.33 327.23   6. Elevated liver enzymes R74.8 790.5       Plan:       Essential hypertension  -  Controlled on present medications.  -     amlodipine (NORVASC) 5 MG tablet; Take 1 tablet (5 mg total) by mouth once daily.  Dispense: 90 tablet; Refill: 0  -     lisinopril-hydrochlorothiazide (PRINZIDE,ZESTORETIC) 20-25 mg Tab; Take 1 tablet by mouth once daily.  Dispense: 90 tablet; Refill: 0    Hyperlipidemia, unspecified hyperlipidemia type  -  Levels remain elevated.  Will get liver workup first to ensure no liver disease and then will start low dose medication at next visit if liver okay.    Acquired hypothyroidism  -  Advised patient she must take medication first thing in the morning on an empty stomach with water separate from other medication and a minimum of 30 minutes prior to eating or drinking Will recheck in 3 months.  -     levothyroxine (SYNTHROID) 112 MCG tablet; Take 1 tablet (112 mcg total) by mouth before breakfast.  Dispense: 90 tablet; Refill: 0    IFG (impaired fasting glucose)  -  FBG still elevated with elevated BMI - but A1C is not consistent with diabetes.  Patient admits to no changes in lifestyle modifications.  Will recheck in 3 months.    KELLY (obstructive sleep apnea)  -  Will try to obtain last sleep study but reports it was multiple years ago.  Will refer to sleep clinic at [place].  -     Ambulatory consult to Sleep Disorders    Elevated liver enzymes  -  Total bilirubin and ALT continues elvate despite no Tylenol and limited alcohol.  Will send to GI to rule out autoimmune or other liver disease.  Suspect fatty liver but since 2 liver enzymes are abnormal - will let GI investigate further.  -     Ambulatory Referral to Gastroenterology      Return in about 3 months (around 8/8/2017) for fasting labs and office visit. Will get CMP and TSH, free T4 before next visit.    Patient's Medications   New  Prescriptions    No medications on file   Previous Medications    No medications on file   Modified Medications    Modified Medication Previous Medication    AMLODIPINE (NORVASC) 5 MG TABLET amlodipine (NORVASC) 5 MG tablet       Take 1 tablet (5 mg total) by mouth once daily.    TAKE 1 TABLET DAILY    LEVOTHYROXINE (SYNTHROID) 112 MCG TABLET levothyroxine (SYNTHROID) 112 MCG tablet       Take 1 tablet (112 mcg total) by mouth before breakfast.    Take 1 tablet (112 mcg total) by mouth before breakfast.    LISINOPRIL-HYDROCHLOROTHIAZIDE (PRINZIDE,ZESTORETIC) 20-25 MG TAB lisinopril-hydrochlorothiazide (PRINZIDE,ZESTORETIC) 20-25 mg Tab       Take 1 tablet by mouth once daily.    TAKE 1 TABLET DAILY   Discontinued Medications    No medications on file

## 2017-05-09 ENCOUNTER — TELEPHONE (OUTPATIENT)
Dept: FAMILY MEDICINE | Facility: CLINIC | Age: 57
End: 2017-05-09

## 2017-05-09 NOTE — TELEPHONE ENCOUNTER
----- Message from Kymebrly Barba NP sent at 5/8/2017  7:31 PM CDT -----  Call [facility] for old paper chart - sleep study for sleep apnea.

## 2017-05-15 ENCOUNTER — TELEPHONE (OUTPATIENT)
Dept: FAMILY MEDICINE | Facility: CLINIC | Age: 57
End: 2017-05-15

## 2017-05-17 ENCOUNTER — INITIAL CONSULT (OUTPATIENT)
Dept: GASTROENTEROLOGY | Facility: CLINIC | Age: 57
End: 2017-05-17
Payer: COMMERCIAL

## 2017-05-17 VITALS
HEIGHT: 60 IN | HEART RATE: 77 BPM | SYSTOLIC BLOOD PRESSURE: 116 MMHG | BODY MASS INDEX: 40.34 KG/M2 | WEIGHT: 205.5 LBS | DIASTOLIC BLOOD PRESSURE: 70 MMHG

## 2017-05-17 DIAGNOSIS — R79.89 LFTS ABNORMAL: Primary | ICD-10-CM

## 2017-05-17 PROCEDURE — 99999 PR PBB SHADOW E&M-EST. PATIENT-LVL III: CPT | Mod: PBBFAC,,, | Performed by: INTERNAL MEDICINE

## 2017-05-17 PROCEDURE — 99243 OFF/OP CNSLTJ NEW/EST LOW 30: CPT | Mod: S$GLB,,, | Performed by: INTERNAL MEDICINE

## 2017-05-17 NOTE — MR AVS SNAPSHOT
[facility]  [address]  [address]  [place] [address]  Phone: [phone]                  Digna Bhatia   2017 3:00 PM   Initial consult    Description:  Female : 1960   Provider:  Cali Oliveira Jr., MD   Department:  [facility]           Reason for Visit     Elevated Hepatic Enzymes           Diagnoses this Visit        Comments    LFTs abnormal    -  Primary            To Do List           Future Appointments        Provider Department Dept Phone    2017 11:15 AM [facility] [phone]    2017 2:00 PM Erika Jenkins MD [facility] [phone]      Goals (5 Years of Data)     None      [facility]     [facility] Care Line -  Assistance  Unless otherwise directed by your provider, please contact [facility], our nurse care line that is available for  assistance.     Registered nurses in the [facility] provide: appointment scheduling, clinical advisement, health education, and other advisory services.  Call: [phone] (toll free)               Medications           Message regarding Medications     Verify the changes and/or additions to your medication regime listed below are the same as discussed with your clinician today.  If any of these changes or additions are incorrect, please notify your healthcare provider.             Verify that the below list of medications is an accurate representation of the medications you are currently taking.  If none reported, the list may be blank. If incorrect, please contact your healthcare provider. Carry this list with you in case of emergency.           Current Medications     amlodipine (NORVASC) 5 MG tablet Take 1 tablet (5 mg total) by mouth once daily.    levothyroxine (SYNTHROID) 112 MCG tablet Take 1 tablet (112 mcg total) by mouth before breakfast.    lisinopril-hydrochlorothiazide  (PRINZIDE,ZESTORETIC) 20-25 mg Tab Take 1 tablet by mouth once daily.           Clinical Reference Information           Your Vitals Were     BP Pulse Height Weight BMI    116/70 77 5' (1.524 m) 93.2 kg (205 lb 7.5 oz) 40.13 kg/m2      Blood Pressure          Most Recent Value    BP  116/70      Allergies as of 5/17/2017     Bactrim [Sulfamethoxazole-trimethoprim]    Pcn [Penicillins]      Immunizations Administered on Date of Encounter - 5/17/2017     None      Orders Placed During Today's Visit     Future Labs/Procedures Expected by Expires    US Abdomen Complete  5/17/2017 5/17/2018      Language Assistance Services     ATTENTION: Language assistance services are available, free of charge. Please call [phone].      ATENCIÓN: Si joanne luz elena, tiene a arreola disposición servicios gratuitos de asistencia lingüística. Llame al [phone].     ANALI Ý: N?u b?n nói Ti?ng Vi?t, có các d?ch v? h? tr? ngôn ng? mi?n phí dành cho b?n. G?i s? [phone].         [facility] complies with applicable Federal civil rights laws and does not discriminate on the basis of race, color, national origin, age, disability, or sex.

## 2017-05-17 NOTE — PROGRESS NOTES
Subjective:      Patient ID: Digna Bhatia is a 56 y.o. female.    Chief Complaint: Elevated Hepatic Enzymes    HPI:   Patient presents for GI consultation.  She has a history of mildly abnormal transaminases.  ALT and AST have typically been less than 60.  Bilirubin has ranged from 1.5-1.8.  Albumin and total protein are normal Hepatitis screen is negative for markers of a B or C.  Alcohol ingestion is rare.    Past medical history includes hypertension, hypothyroidism.  Sleep apnea.   Her fasting blood sugar runs on the high side.  Patient is obese but indicates her weight has been stable.  Nonsmoker.  Alcohol rare.  In all likelihood these minor enzyme changes reflective of non alcoholic fatty liver disease.  Indicates she had a screening colonoscopy at [place] in 2015 or 16.  We will request that report      Review of patient's allergies indicates:   Allergen Reactions    Bactrim [sulfamethoxazole-trimethoprim] Rash    Pcn [penicillins] Rash     Past Medical History:   Diagnosis Date    H/O mammogram 3/31/2016    [facility]    Hypertension     Hypothyroidism     IFG (impaired fasting glucose)     KELLY (obstructive sleep apnea)     did not tolerate any of the masks =      Past Surgical History:   Procedure Laterality Date     SECTION      COLONOSCOPY N/A 2016    Procedure: COLONOSCOPY-Miralax split prep ;  Surgeon: Cali Oliveira Jr., MD;  Location: [facility];  Service: Endoscopy;  Laterality: N/A;    DILATION AND CURETTAGE OF UTERUS       Family History   Problem Relation Age of Onset    Dementia Mother     Parkinsonism Mother      PASSED AGE 78    Hypertension Father     Cancer Father      KIDNEY PASSED AGE 70    Heart disease Father 50     HEART ATTACK    Diabetes Father     Hypertension Sister     Diabetes Brother     Hypertension Brother     Diabetes Sister     Hypertension Sister     Hypertension Sister     Hypertension Brother     No Known Problems Daughter      No Known Problems Son     No Known Problems Son      Social History     Social History    Marital status:      Spouse name: N/A    Number of children: N/A    Years of education: N/A     Occupational History    Not on file.     Social History Main Topics    Smoking status: Never Smoker    Smokeless tobacco: Never Used    Alcohol use Yes      Comment: rare    Drug use: No    Sexual activity: Not on file     Other Topics Concern    Not on file     Social History Narrative       Review of Systems:  Constitutional: Negative for appetite change.   HENT: Negative for trouble swallowing.   Eyes: Negative for photophobia.   Respiratory: Negative for cough and shortness of breath.   Cardiovascular: Negative for palpitations.   Gastrointestinal: See HPI for details.  Genitourinary: Negative for frequency and hematuria.   Skin: Negative for rash.   Neurological: Negative for weakness and headaches.   Hematological: Negative.   Psychiatric/Behavioral: Negative for suicidal ideas and behavioral problems.     Objective:     /70  Pulse 77  Ht 5' (1.524 m)  Wt 93.2 kg (205 lb 7.5 oz)  BMI 40.13 kg/m2    Physical Exam:  Eyes: Pupils are equal, round, and reactive to light.   Neck: Supple. No mass  Cardiovascular: Regular rhythm . No murmur   Pulmonary/Chest: Lungs clear   Abdominal: Soft. No mass palpated. Nontender, no guarding. Positive bowel sounds   Musculoskeletal: No deformity. No edema.   Psychiatric: Alert and oriented    Assessment:     1. LFTs abnormal      Plan:     Digna was seen today for elevated hepatic enzymes.    Diagnoses and all orders for this visit:    LFTs abnormal  -     US Abdomen Complete; Future      Plan:  Ultrasound abdomen  Encouraged weight reduction  Follow-up with me in one year

## 2017-05-17 NOTE — LETTER
May 17, 2017      [place]  [address]  [address]  [place] [address]           [facility]  [address]  [address]  [place] [address]  Phone: [phone]          Patient: Digna Bhatia   MR Number: [id number]   YOB: 1960   Date of Visit: 5/17/2017       Dear Kymberly Barba:    Thank you for referring Digna Bhatia to me for evaluation. Attached you will find relevant portions of my assessment and plan of care.    If you have questions, please do not hesitate to call me. I look forward to following Digna Bhatia along with you.    Sincerely,    Cali Oliveira Jr., MD    Enclosure  CC:  No Recipients    If you would like to receive this communication electronically, please contact [email or web address] or [phone] to request more information on [email or web address] Link access.    For providers and/or their staff who would like to refer a patient to [facility], please contact us through our one-stop-shop provider referral line, [facility] , at [phone].    If you feel you have received this communication in error or would no longer like to receive these types of communications, please e-mail [email or web address]

## 2017-05-23 ENCOUNTER — TELEPHONE (OUTPATIENT)
Dept: GASTROENTEROLOGY | Facility: CLINIC | Age: 57
End: 2017-05-23

## 2017-05-23 NOTE — TELEPHONE ENCOUNTER
----- Message from Cali Oliveira Jr., MD sent at 5/17/2017  4:19 PM CDT -----  Patient had a colonoscopy by me at [facility] in 2015 or 2016.  Please call [facility] at [phone] and request they fax or mail a copy of that colonoscopy report and the associated pathology report to us.    Should be scanned into Epic after I'm able to review it.

## 2017-07-26 ENCOUNTER — OFFICE VISIT (OUTPATIENT)
Dept: SLEEP MEDICINE | Facility: CLINIC | Age: 57
End: 2017-07-26
Payer: COMMERCIAL

## 2017-07-26 VITALS
DIASTOLIC BLOOD PRESSURE: 78 MMHG | HEART RATE: 75 BPM | BODY MASS INDEX: 40.34 KG/M2 | HEIGHT: 60 IN | SYSTOLIC BLOOD PRESSURE: 121 MMHG | WEIGHT: 205.5 LBS

## 2017-07-26 DIAGNOSIS — G47.30 SLEEP APNEA, UNSPECIFIED TYPE: Primary | ICD-10-CM

## 2017-07-26 PROCEDURE — 99204 OFFICE O/P NEW MOD 45 MIN: CPT | Mod: S$GLB,,, | Performed by: PSYCHIATRY & NEUROLOGY

## 2017-07-26 PROCEDURE — 99999 PR PBB SHADOW E&M-EST. PATIENT-LVL III: CPT | Mod: PBBFAC,,, | Performed by: PSYCHIATRY & NEUROLOGY

## 2017-07-26 NOTE — PATIENT INSTRUCTIONS
SLEEP LAB (Mel or Venkatesh) will contact you to schedulethe sleep study. Their number is [phone] (ext [phone]). Please call them if you do not hear from them in 10 business days from now.  The [facility] is located on 7th floor of the [facility]; [facility] is located in [facility].    SLEEP CLINIC (my assistant) will call you when the sleep study results are ready - if you have not heard from us by 2 weeks from the date of the study, please call [phone] (ext 2) or you can use [facility] to contact me.    You are advised to abstain from driving should you feel sleepy or drowsy.

## 2017-07-26 NOTE — PROGRESS NOTES
Digna Bhatia  was seen at the request of  Kymberly Barba NP for sleep evaluation.    07/26/2017 INITIAL HISTORY OF PRESENT ILLNESS:  Digna Bhatia is a 56 y.o. female is here to be evaluated for a sleep disorder.       CHIEF COMPLAINT:      The patient's complaints include excessive daytime sleepiness, excessive daytime fatigue, snoring,  witnessed breathing pauses,  gasping for air in sleep and interrupted sleep since  Many years ago.    She had PSG 5 years ago. Was diagnosed with KELLY. She could not tolerate CPAP machine (FFM or nasal pillows). It was over 5 years ago    Has to wear  for teeth grinding - not very compliant with wearing it.    Denies  dry mouth and sore throat  Denies nasal congestion   Denies  morning headaches  Denies  interrupted sleep  Denies frequent leg movements  Denies symptoms concerning for parasomnia    The ESS ([place] Sleepiness Score) taken on initial visit is 5 /24      SLEEP ROUTINE AND LIFESTYLE 07/26/2017 :    Bed partner:      Time to bed - wake up time on a workday : 10:30 to 6:45  Time to bed - wake up time on a day off: 11:30 to  9 AM  Sleep onset latency: 30 min  Disruptions or awakenings: 1-2  Time to fall back into sleep: 20 min   Perceived sleep quality: 3/5  Perceived total sleep time:  6  hours.  Daytime naps: noneusually    Exercise routine: not enough  Caffeine:       PREVIOUS SLEEP STUDIES:     About 5 years ago [facility] n/a    DME:       PAST MEDICAL HISTORY:    Active Ambulatory Problems     Diagnosis Date Noted    Hypertension     Hypothyroidism     IFG (impaired fasting glucose)     Screening for colorectal cancer 07/29/2016     Resolved Ambulatory Problems     Diagnosis Date Noted    No Resolved Ambulatory Problems     Past Medical History:   Diagnosis Date    H/O mammogram 3/31/2016    Hypertension     Hypothyroidism     IFG (impaired fasting glucose)     KELLY (obstructive sleep apnea)                 PAST SURGICAL  HISTORY:    Past Surgical History:   Procedure Laterality Date     SECTION      COLONOSCOPY N/A 2016    Procedure: COLONOSCOPY-Miralax split prep ;  Surgeon: Cali Oliveira Jr., MD;  Location: [facility];  Service: Endoscopy;  Laterality: N/A;    DILATION AND CURETTAGE OF UTERUS           FAMILY HISTORY:                Family History   Problem Relation Age of Onset    Dementia Mother     Parkinsonism Mother      PASSED AGE 78    Hypertension Father     Cancer Father      KIDNEY PASSED AGE 70    Heart disease Father 50     HEART ATTACK    Diabetes Father     Hypertension Sister     Diabetes Brother     Hypertension Brother     Diabetes Sister     Hypertension Sister     Hypertension Sister     Hypertension Brother     No Known Problems Daughter     No Known Problems Son     No Known Problems Son        SOCIAL HISTORY:          Tobacco:   History   Smoking Status    Never Smoker   Smokeless Tobacco    Never Used       alcohol use:    History   Alcohol Use    Yes     Comment: rare                   ALLERGIES:    Review of patient's allergies indicates:   Allergen Reactions    Bactrim [sulfamethoxazole-trimethoprim] Rash    Pcn [penicillins] Rash       CURRENT MEDICATIONS:    Current Outpatient Prescriptions   Medication Sig Dispense Refill    amlodipine (NORVASC) 5 MG tablet Take 1 tablet (5 mg total) by mouth once daily. 90 tablet 0    levothyroxine (SYNTHROID) 112 MCG tablet Take 1 tablet (112 mcg total) by mouth before breakfast. 90 tablet 0    lisinopril-hydrochlorothiazide (PRINZIDE,ZESTORETIC) 20-25 mg Tab Take 1 tablet by mouth once daily. 90 tablet 0     No current facility-administered medications for this visit.                       REVIEW OF SYSTEMS:   Sleep related symptoms as per HPI    reports weight gain  Denies dyspnea  Denies palpitations  Denies acid reflux   Denies polyuria  Denies  mood diturbance  Denies  anemia  Denies  muscle pain  Denies  Gait  "imbalance    Otherwise, a balance of 10 systems reviewed is negative.    PHYSICAL EXAM:  /78 (BP Location: Left arm, Patient Position: Sitting, BP Method: Automatic)   Pulse 75   Ht 5' (1.524 m)   Wt 93.2 kg (205 lb 7.5 oz)   BMI 40.13 kg/m²   GENERAL: Overweight body habitus, well groomed.  HEENT:   HEENT:  Conjunctivae are non-erythematous; Pupils equal, round, and reactive to light; Nose is symmetrical; Nasal mucosa is pink and moist; Septum is midline; Inferior turbinates are normal; Nasal airflow is normal; Posterior pharynx is pink; Modified Mallampati:III-IV; Posterior palate is low; Tonsils not visualized; Uvula is wide and elongated;Tongue is enlarged; Dentition is fair; No TMJ tenderness; Jaw opening and protrusion without click and without discomfort.  NECK: Supple. Neck circumference is 16.2 inches. No thyromegaly. No palpable nodes.     SKIN: On face and neck: No abrasions, no rashes, no lesions.  No subcutaneous nodules are palpable.  RESPIRATORY: Chest is clear to auscultation.  Normal chest expansion and non-labored breathing at rest.  CARDIOVASCULAR: Normal S1, S2.  No murmurs, gallops or rubs. No carotid bruits bilaterally.  No edema. No clubbing. No cyanosis.    NEURO: Oriented to time, place and person. Normal attention span and concentration. Gait normal.    PSYCH: Affect is full. Mood is normal  MUSCULOSKELETAL: Moves 4 extremities. Gait normal.       Using My [facility]: yes       ASSESSMENT:    1. KELLY (obstructive sleep apnea). The patient symptomatically has  excessive daytime sleepiness, snoring, excessive daytime fatigue, gasping for air in sleep and interrupted sleep  with exam findings of "a crowded oral airway and elevated body mass index. The patient has medical co-morbidities of hyperlipidemia and hypertension,  which can be worsened by KELLY. This warrants further investigation for possible obstructive sleep apnea.        PLAN:    Diagnostic: Polysomnogram HOME ([facility]) to confirm  " sleep disordered breathing.    More than 25 minutes of this 45 minutes visit was spent in counseling: during our discussion today, we talked about the etiology of  KELLY as well as the potential ramifications of untreated sleep apnea, which could include daytime sleepiness, hypertension, heart disease and/or stroke.  We discussed potential treatment options, which could include weight loss, body positioning, continuous positive airway pressure (CPAP), or referral for surgical consideration. Meanwhile, she  is urged to avoid supine sleep, weight gain and alcoholic beverages since all of these can worsen KELLY.     Precautions: The patient was advised to abstain from driving should he feel sleepy or drowsy.    Follow up: MD/NP  after the sleep study has been completed.     Thank you for allowing me the opportunity to participate in the care of your patient.    This visit summary will be sent to referring provider via [email or web address]

## 2017-07-26 NOTE — LETTER
July 28, 2017      [place]  [address]  [address]  [place] [address]           [facility]  [address] [address]  Phone: [phone]  Fax: [phone]          Patient: Digna Bhatia   MR Number: [id number]   YOB: 1960   Date of Visit: 7/26/2017       Dear Kymberly Barba:    Thank you for referring Digna Bhatia to me for evaluation. Attached you will find relevant portions of my assessment and plan of care.    If you have questions, please do not hesitate to call me. I look forward to following Digna Bhatia along with you.    Sincerely,    Erika Jenkins MD    Enclosure  CC:  No Recipients    If you would like to receive this communication electronically, please contact [email or web address] or [phone] to request more information on [employer] access.    For providers and/or their staff who would like to refer a patient to [facility], please contact us through our one-stop-shop provider referral line, [facility] , at [phone].    If you feel you have received this communication in error or would no longer like to receive these types of communications, please e-mail [email or web address]

## 2017-07-28 ENCOUNTER — PATIENT MESSAGE (OUTPATIENT)
Dept: FAMILY MEDICINE | Facility: CLINIC | Age: 57
End: 2017-07-28

## 2017-07-28 DIAGNOSIS — I10 ESSENTIAL HYPERTENSION: ICD-10-CM

## 2017-07-28 DIAGNOSIS — E03.9 ACQUIRED HYPOTHYROIDISM: ICD-10-CM

## 2017-08-01 ENCOUNTER — TELEPHONE (OUTPATIENT)
Dept: SLEEP MEDICINE | Facility: OTHER | Age: 57
End: 2017-08-01

## 2017-08-01 RX ORDER — LEVOTHYROXINE SODIUM 112 UG/1
112 TABLET ORAL
Qty: 90 TABLET | Refills: 0 | Status: SHIPPED | OUTPATIENT
Start: 2017-08-01 | End: 2017-11-24 | Stop reason: SDUPTHER

## 2017-08-01 RX ORDER — AMLODIPINE BESYLATE 5 MG/1
5 TABLET ORAL DAILY
Qty: 90 TABLET | Refills: 0 | Status: SHIPPED | OUTPATIENT
Start: 2017-08-01 | End: 2017-11-24 | Stop reason: SDUPTHER

## 2017-08-01 RX ORDER — LISINOPRIL AND HYDROCHLOROTHIAZIDE 20; 25 MG/1; MG/1
1 TABLET ORAL DAILY
Qty: 90 TABLET | Refills: 0 | Status: SHIPPED | OUTPATIENT
Start: 2017-08-01 | End: 2017-11-17 | Stop reason: SDUPTHER

## 2017-08-16 ENCOUNTER — HOSPITAL ENCOUNTER (OUTPATIENT)
Dept: RADIOLOGY | Facility: HOSPITAL | Age: 57
Discharge: HOME | End: 2017-08-16
Attending: NURSE PRACTITIONER
Payer: COMMERCIAL

## 2017-08-16 DIAGNOSIS — R79.89 LFTS ABNORMAL: ICD-10-CM

## 2017-08-16 DIAGNOSIS — R79.89 LFTS ABNORMAL: Primary | ICD-10-CM

## 2017-08-16 PROCEDURE — 76700 US EXAM ABDOM COMPLETE: CPT | Mod: TC

## 2017-08-16 PROCEDURE — 76700 US EXAM ABDOM COMPLETE: CPT | Mod: 26,,, | Performed by: RADIOLOGY

## 2017-08-17 ENCOUNTER — TELEPHONE (OUTPATIENT)
Dept: GASTROENTEROLOGY | Facility: CLINIC | Age: 57
End: 2017-08-17

## 2017-08-17 NOTE — TELEPHONE ENCOUNTER
----- Message from Cali Oliveira Jr., MD sent at 8/16/2017 11:56 AM CDT -----  Ultrasound shows fatty liver- as expected  Otherwise WNL  Notify patient.

## 2017-08-18 ENCOUNTER — TELEPHONE (OUTPATIENT)
Dept: GASTROENTEROLOGY | Facility: CLINIC | Age: 57
End: 2017-08-18

## 2017-08-21 ENCOUNTER — TELEPHONE (OUTPATIENT)
Dept: GASTROENTEROLOGY | Facility: CLINIC | Age: 57
End: 2017-08-21

## 2017-09-05 ENCOUNTER — TELEPHONE (OUTPATIENT)
Dept: FAMILY MEDICINE | Facility: CLINIC | Age: 57
End: 2017-09-05

## 2017-09-05 DIAGNOSIS — E78.5 HYPERLIPIDEMIA, UNSPECIFIED HYPERLIPIDEMIA TYPE: ICD-10-CM

## 2017-09-05 DIAGNOSIS — I10 ESSENTIAL HYPERTENSION: Primary | ICD-10-CM

## 2017-09-05 DIAGNOSIS — R73.01 IFG (IMPAIRED FASTING GLUCOSE): ICD-10-CM

## 2017-09-05 DIAGNOSIS — E03.9 ACQUIRED HYPOTHYROIDISM: ICD-10-CM

## 2017-09-05 DIAGNOSIS — Z13.0 SCREENING FOR DEFICIENCY ANEMIA: ICD-10-CM

## 2017-09-05 NOTE — TELEPHONE ENCOUNTER
----- Message from Ponce [place] sent at 9/5/2017  3:40 PM CDT -----  Contact: [phone]/self  Pt requesting lab orders be put in the system.  Please advise

## 2017-09-12 ENCOUNTER — OFFICE VISIT (OUTPATIENT)
Dept: FAMILY MEDICINE | Facility: CLINIC | Age: 57
End: 2017-09-12
Payer: COMMERCIAL

## 2017-09-12 VITALS
HEART RATE: 70 BPM | DIASTOLIC BLOOD PRESSURE: 88 MMHG | WEIGHT: 211 LBS | BODY MASS INDEX: 41.43 KG/M2 | OXYGEN SATURATION: 99 % | TEMPERATURE: 98 F | HEIGHT: 60 IN | SYSTOLIC BLOOD PRESSURE: 124 MMHG

## 2017-09-12 DIAGNOSIS — Z00.00 ANNUAL PHYSICAL EXAM: Primary | ICD-10-CM

## 2017-09-12 DIAGNOSIS — K76.0 NAFLD (NONALCOHOLIC FATTY LIVER DISEASE): ICD-10-CM

## 2017-09-12 DIAGNOSIS — I10 ESSENTIAL HYPERTENSION: ICD-10-CM

## 2017-09-12 DIAGNOSIS — E66.01 MORBID OBESITY WITH BMI OF 40.0-44.9, ADULT: ICD-10-CM

## 2017-09-12 DIAGNOSIS — E78.5 HYPERLIPIDEMIA, UNSPECIFIED HYPERLIPIDEMIA TYPE: ICD-10-CM

## 2017-09-12 DIAGNOSIS — E03.9 ACQUIRED HYPOTHYROIDISM: ICD-10-CM

## 2017-09-12 DIAGNOSIS — R73.01 IFG (IMPAIRED FASTING GLUCOSE): ICD-10-CM

## 2017-09-12 PROCEDURE — 99396 PREV VISIT EST AGE 40-64: CPT | Mod: S$GLB,,, | Performed by: NURSE PRACTITIONER

## 2017-09-12 PROCEDURE — 99999 PR PBB SHADOW E&M-EST. PATIENT-LVL IV: CPT | Mod: PBBFAC,,, | Performed by: NURSE PRACTITIONER

## 2017-09-12 RX ORDER — ROSUVASTATIN CALCIUM 10 MG/1
10 TABLET, COATED ORAL DAILY
Qty: 90 TABLET | Refills: 1 | Status: SHIPPED | OUTPATIENT
Start: 2017-09-12 | End: 2018-03-11 | Stop reason: SDUPTHER

## 2017-09-12 RX ORDER — ASPIRIN 81 MG/1
81 TABLET ORAL DAILY
COMMUNITY

## 2017-09-12 NOTE — PROGRESS NOTES
Subjective:       Patient ID: Digna Bhatia is a 56 y.o. female.    Chief Complaint: Annual Exam (wellness)    Patient is a 56 year old white female here today for annual physical exam with fasting lab results.    Patient has Hypertension that is controlled on present medications, Amlodipine and Lisinopril HCT.  /88 (BP Location: Right arm, Patient Position: Sitting, BP Method: Medium (Manual))   Pulse 70   Temp 97.8 °F (36.6 °C) (Oral)   Ht 5' (1.524 m)   Wt 95.7 kg (210 lb 15.7 oz)   SpO2 99%   BMI 41.20 kg/m²     Patient has Hypothyroidism that is controlled on Synthroid 112 mcg daily.  She is taking medication in the morning with no other food or medication and tolerating well.    Patient has IFG - FBG now 132 and has been mildly elevated in the low 120s for past couple years but her HgbA1C and average daily sugars are not consistent with diabetes - last HgbA1C was 5.6 with average glucose 114. Body mass index is 41.2 kg/m².    Patient has always had an elevated total bilirubin in the past but rest of liver enzymes were fine so we continued to monitor but in Feb., the AST and ALT are were slightly elevated at 47 and 48 and then bilirubin continues to climb 1.8 with ALT 51 in May - so had  Advised on no Tylenol and no Alcohol and weight loss in case this is secondary to fatty liver and sent to GI for further evaluation to rule out autoimmune or other liver disease.  Hepatitis panel was negative.   Patient seen Dr. Cali Oliveira May 2017 and had liver ultrasound - diagnosed with Fatty Liver Disease.    Patient with loud snoring with witnessed apnea.  Has seen the sleep medicine MD and has home sleep study scheduled for 9/19/17.    WELLNESS LABS:  -  CBC WNL  -  CMP - , elevated bilirubin and ALT - both abnormalities discussed above - the rest of CMP within acceptable range.  -  Thyroid levels are controlled on present medication.    -  Cholesterol levels are high and unchanged with  lifestyle modifications - total cholesterol high at 226 with .2.    Health Maintenance:  -  Up to date - gets flu vaccine at work.      Component      Latest Ref Rng & Units 9/8/2017 5/5/2017 2/7/2017 7/21/2016   WBC      3.90 - 12.70 K/uL 6.85      RBC      4.00 - 5.40 M/uL 4.54      Hemoglobin      12.0 - 16.0 g/dL 13.5      Hematocrit      37.0 - 48.5 % 39.4      MCV      82 - 98 fL 87      MCH      27.0 - 31.0 pg 29.7      MCHC      32.0 - 36.0 g/dL 34.3      RDW      11.5 - 14.5 % 13.6      Platelets      150 - 350 K/uL 274      MPV      9.2 - 12.9 fL 10.2      Gran #      1.8 - 7.7 K/uL 4.6      Lymph #      1.0 - 4.8 K/uL 1.2      Mono #      0.3 - 1.0 K/uL 0.7      Eos #      0.0 - 0.5 K/uL 0.3      Baso #      0.00 - 0.20 K/uL 0.10      Gran%      38.0 - 73.0 % 66.7      Lymph%      18.0 - 48.0 % 18.0      Mono%      4.0 - 15.0 % 10.2      Eosinophil%      0.0 - 8.0 % 3.6      Basophil%      0.0 - 1.9 % 1.5      Differential Method       Automated      Sodium      136 - 145 mmol/L 141 139 140 137   Potassium      3.5 - 5.1 mmol/L 4.3 4.0 3.9 4.2   Chloride      95 - 110 mmol/L 102 106 100 102   CO2      23 - 29 mmol/L 28 25 29 24   Glucose      70 - 110 mg/dL 132 (H) 128 (H) 132 (H) 126 (H)   BUN, Bld      7 - 17 mg/dL 16 18 (H) 20 (H) 24 (H)   Creatinine      0.50 - 1.40 mg/dL 0.94 0.81 0.82 0.9   Calcium      8.7 - 10.5 mg/dL 9.7 9.3 9.4 9.7   Total Protein      6.0 - 8.4 g/dL 7.3 7.3 7.4 7.0   Albumin      3.5 - 5.2 g/dL 4.7 4.3 4.4 4.0   Total Bilirubin      0.1 - 1.0 mg/dL 1.8 (H) 1.8 (H) 1.5 (H) 1.6 (H)   Alkaline Phosphatase      38 - 126 U/L 79 84 89 85   AST      15 - 46 U/L 31 28 47 (H) 24   ALT      10 - 44 U/L 49 (H) 51 (H) 48 (H) 35   Anion Gap      8 - 16 mmol/L 11 8 11 11   eGFR if African American      >60 mL/min/1.73 m:2 >60.0 >60.0 >60.0 >60.0   eGFR if non African American      >60 mL/min/1.73 m:2 >60.0 >60.0 >60.0 >60.0   Cholesterol      120 - 199 mg/dL 226 (H) 226 (H) 209 (H)  231 (H)   Triglycerides      30 - 150 mg/dL 94 107 91 112   HDL      40 - 75 mg/dL 56 49 52 53   LDL Cholesterol      63.0 - 159.0 mg/dL 151.2 155.6 138.8 155.6   HDL/Chol Ratio      20.0 - 50.0 % 24.8 21.7 24.9 22.9   Total Cholesterol/HDL Ratio      2.0 - 5.0 4.0 4.6 4.0 4.4   Non-HDL Cholesterol      mg/dL 170 177 157 178   Hepatitis B Surface Ag        Negative     Hep B C IgM        Negative     Hep A IgM        Negative     Hepatitis C Ab        Negative     Hemoglobin A1C      4.0 - 5.6 % 5.6 5.5  5.4   Estimated Avg Glucose      68 - 131 mg/dL 114 111  108   TSH      0.400 - 4.000 uIU/mL 1.920 2.650 2.700 5.770 (H)   Free T4      0.71 - 1.51 ng/dL 1.33 1.09 1.09 0.92     Previous Medications    AMLODIPINE (NORVASC) 5 MG TABLET    Take 1 tablet (5 mg total) by mouth once daily.    LEVOTHYROXINE (SYNTHROID) 112 MCG TABLET    Take 1 tablet (112 mcg total) by mouth before breakfast.    LISINOPRIL-HYDROCHLOROTHIAZIDE (PRINZIDE,ZESTORETIC) 20-25 MG TAB    Take 1 tablet by mouth once daily.       Past Medical History:   Diagnosis Date    H/O mammogram 3/31/2016    [facility] at [facility]    Hypertension     Hypothyroidism     IFG (impaired fasting glucose)     NAFLD (nonalcoholic fatty liver disease)     KELLY (obstructive sleep apnea)     did not tolerate any of the masks =        Past Surgical History:   Procedure Laterality Date     SECTION      COLONOSCOPY N/A 2016    Procedure: COLONOSCOPY-Miralax split prep ;  Surgeon: Cali Oliveira Jr., MD;  Location: [facility];  Service: Endoscopy;  Laterality: N/A;    DILATION AND CURETTAGE OF UTERUS         Family History   Problem Relation Age of Onset    Dementia Mother     Parkinsonism Mother      PASSED AGE 78    Hypertension Father     Cancer Father      KIDNEY PASSED AGE 70    Heart disease Father 50     HEART ATTACK    Diabetes Father     Hypertension Sister     Diabetes Brother     Hypertension Brother     Diabetes Sister      Hypertension Sister     Hypertension Sister     Hypertension Brother     No Known Problems Daughter     No Known Problems Son     No Known Problems Son        Social History     Social History    Marital status:      Spouse name: N/A    Number of children: N/A    Years of education: N/A     Social History Main Topics    Smoking status: Never Smoker    Smokeless tobacco: Never Used    Alcohol use Yes      Comment: rare    Drug use: No    Sexual activity: Not Asked     Other Topics Concern    None     Social History Narrative    None       Review of Systems   Constitutional: Negative for appetite change, chills, fatigue, fever and unexpected weight change.   HENT: Negative for congestion, ear pain, mouth sores, nosebleeds, postnasal drip, rhinorrhea, sinus pressure, sneezing, sore throat, trouble swallowing and voice change.    Eyes: Negative for photophobia, pain, discharge, redness, itching and visual disturbance.   Respiratory: Negative for cough, chest tightness and shortness of breath.    Cardiovascular: Negative for chest pain, palpitations and leg swelling.   Gastrointestinal: Negative for abdominal pain, blood in stool, constipation, diarrhea, nausea and vomiting.   Genitourinary: Negative for dysuria, frequency, hematuria and urgency.   Musculoskeletal: Negative for arthralgias, back pain, joint swelling and myalgias.   Skin: Negative for color change and rash.   Allergic/Immunologic: Negative for immunocompromised state.   Neurological: Negative for dizziness, seizures, syncope, weakness and headaches.   Hematological: Negative for adenopathy. Does not bruise/bleed easily.   Psychiatric/Behavioral: Negative for agitation, dysphoric mood, sleep disturbance and suicidal ideas. The patient is not nervous/anxious.          Objective:     Vitals:    09/12/17 1325   BP: 124/88   BP Location: Right arm   Patient Position: Sitting   BP Method: Medium (Manual)   Pulse: 70   Temp: 97.8 °F (36.6  °C)   TempSrc: Oral   SpO2: 99%   Weight: 95.7 kg (210 lb 15.7 oz)   Height: 5' (1.524 m)          Physical Exam   Constitutional: She is oriented to person, place, and time. She appears well-developed and well-nourished.   + obesity with Body mass index is 41.2 kg/m².         HENT:   Head: Normocephalic and atraumatic.   Right Ear: External ear normal.   Left Ear: External ear normal.   Nose: Nose normal.   Mouth/Throat: Oropharynx is clear and moist. No oropharyngeal exudate.   Eyes: EOM are normal. Pupils are equal, round, and reactive to light.   Neck: Normal range of motion. Neck supple. No JVD present. No tracheal deviation present. No thyromegaly present.   Cardiovascular: Normal rate, regular rhythm and normal heart sounds.    No murmur heard.  Pulmonary/Chest: Effort normal and breath sounds normal. No stridor. No respiratory distress. She has no wheezes. She has no rales.   Abdominal: Soft. She exhibits no distension. There is no tenderness. There is no guarding.   Musculoskeletal: Normal range of motion. She exhibits no edema.   Lymphadenopathy:     She has no cervical adenopathy.   Neurological: She is alert and oriented to person, place, and time. No cranial nerve deficit. Coordination normal.   Skin: Skin is warm and dry. No rash noted.   Psychiatric: She has a normal mood and affect.         Assessment:         ICD-10-CM ICD-9-CM   1. Annual physical exam Z00.00 V70.0   2. NAFLD (nonalcoholic fatty liver disease) K76.0 571.8   3. Essential hypertension I10 401.9   4. Acquired hypothyroidism E03.9 244.9   5. Hyperlipidemia, unspecified hyperlipidemia type E78.5 272.4   6. IFG (impaired fasting glucose) R73.01 790.21   7. Morbid obesity with BMI of 40.0-44.9, adult E66.01 278.01    Z68.41 V85.41       Plan:       Annual physical exam  -  Up to date - gets flu vaccine at work at end of October.    Health Maintenance Summary     Influenza Vaccine Overdue 8/1/2017     Done 10/18/2016 Imm Admin: influenza -  Quadrivalent    Declined 10/6/2015 getting done at work    Done 12/2/2006 Imm Admin: Influenza   Mammogram Next Due 4/21/2019     Done 4/21/2017 [facility]    Done 3/31/2016 normal   Pap Smear with HPV Cotest Next Due 3/2/2020     Done 3/2/2017 LIQUID-BASED PAP SMEAR, SCREENING   Lipid Panel Next Due 9/8/2022     Done 9/8/2017 LIPID PANEL (A)    Done 5/5/2017 LIPID PANEL (A)    Done 2/7/2017 LIPID PANEL (A)    Done 7/21/2016 LIPID PANEL (A)    Done 9/30/2015 LIPID PANEL (A)   TETANUS VACCINE Next Due 7/26/2026     Done 7/26/2016 Imm Admin: Tdap   Colonoscopy Next Due 7/29/2026     Done 7/29/2016 COLONOSCOPY   Hepatitis C Screening Completed     Done 5/5/2017 HEPATITIS PANEL, ACUTE    Done 7/21/2016 HEPATITIS C ANTIBODY       NAFLD (nonalcoholic fatty liver disease)  -  Weight loss, low fat diet and other lifestyle modifications.    Essential hypertension  -  Controlled on present medications - will send refill request when needed.    Acquired hypothyroidism  -  Controlled on present medications -recheck in 6 months.  -     TSH; Future; Expected date: 09/12/2017  -     T4, free; Future; Expected date: 09/12/2017    Hyperlipidemia, unspecified hyperlipidemia type  -  Start Crestor 10 mg daily and recheck fasting labs in 6 months.  -     rosuvastatin (CRESTOR) 10 MG tablet; Take 1 tablet (10 mg total) by mouth once daily.  Dispense: 90 tablet; Refill: 1  -     Lipid panel; Future; Expected date: 09/12/2017    IFG (impaired fasting glucose)  -  Weight loss, lifestyle modifications.  -     Hemoglobin A1c; Future; Expected date: 09/12/2017  -     Comprehensive metabolic panel; Future; Expected date: 09/12/2017    Morbid obesity with BMI of 40.0-44.9, adult      Return in about 6 months (around 3/12/2018) for fasting labs and office visit.     Patient's Medications   New Prescriptions    ROSUVASTATIN (CRESTOR) 10 MG TABLET    Take 1 tablet (10 mg total) by mouth once daily.   Previous Medications    AMLODIPINE (NORVASC) 5 MG  TABLET    Take 1 tablet (5 mg total) by mouth once daily.    ASPIRIN (ECOTRIN) 81 MG EC TABLET    Take 81 mg by mouth once daily.    LEVOTHYROXINE (SYNTHROID) 112 MCG TABLET    Take 1 tablet (112 mcg total) by mouth before breakfast.    LISINOPRIL-HYDROCHLOROTHIAZIDE (PRINZIDE,ZESTORETIC) 20-25 MG TAB    Take 1 tablet by mouth once daily.   Modified Medications    No medications on file   Discontinued Medications    No medications on file

## 2017-09-18 ENCOUNTER — TELEPHONE (OUTPATIENT)
Dept: SLEEP MEDICINE | Facility: OTHER | Age: 57
End: 2017-09-18

## 2017-09-19 ENCOUNTER — TELEPHONE (OUTPATIENT)
Dept: SLEEP MEDICINE | Facility: OTHER | Age: 57
End: 2017-09-19

## 2017-09-29 ENCOUNTER — TELEPHONE (OUTPATIENT)
Dept: SLEEP MEDICINE | Facility: OTHER | Age: 57
End: 2017-09-29

## 2017-10-02 ENCOUNTER — HOSPITAL ENCOUNTER (OUTPATIENT)
Dept: SLEEP MEDICINE | Facility: OTHER | Age: 57
Discharge: HOME | End: 2017-10-02
Attending: PSYCHIATRY & NEUROLOGY
Payer: COMMERCIAL

## 2017-10-02 DIAGNOSIS — G47.33 OSA (OBSTRUCTIVE SLEEP APNEA): ICD-10-CM

## 2017-10-02 DIAGNOSIS — G47.30 SLEEP APNEA, UNSPECIFIED TYPE: ICD-10-CM

## 2017-10-02 PROCEDURE — 95806 SLEEP STUDY UNATT&RESP EFFT: CPT | Mod: 26,,, | Performed by: PSYCHIATRY & NEUROLOGY

## 2017-10-02 PROCEDURE — 95800 SLP STDY UNATTENDED: CPT

## 2017-10-06 ENCOUNTER — CLINICAL SUPPORT (OUTPATIENT)
Dept: FAMILY MEDICINE | Facility: CLINIC | Age: 57
End: 2017-10-06
Payer: COMMERCIAL

## 2017-10-06 DIAGNOSIS — Z23 FLU VACCINE NEED: Primary | ICD-10-CM

## 2017-10-06 PROCEDURE — 90686 IIV4 VACC NO PRSV 0.5 ML IM: CPT | Mod: S$GLB,,, | Performed by: FAMILY MEDICINE

## 2017-10-06 PROCEDURE — 90471 IMMUNIZATION ADMIN: CPT | Mod: S$GLB,,, | Performed by: FAMILY MEDICINE

## 2017-10-25 ENCOUNTER — TELEPHONE (OUTPATIENT)
Dept: SLEEP MEDICINE | Facility: CLINIC | Age: 57
End: 2017-10-25

## 2017-10-26 ENCOUNTER — TELEPHONE (OUTPATIENT)
Dept: SLEEP MEDICINE | Facility: CLINIC | Age: 57
End: 2017-10-26

## 2017-11-16 ENCOUNTER — OFFICE VISIT (OUTPATIENT)
Dept: SLEEP MEDICINE | Facility: CLINIC | Age: 57
End: 2017-11-16
Payer: COMMERCIAL

## 2017-11-16 VITALS
BODY MASS INDEX: 41.37 KG/M2 | HEIGHT: 60 IN | HEART RATE: 74 BPM | DIASTOLIC BLOOD PRESSURE: 84 MMHG | WEIGHT: 210.75 LBS | SYSTOLIC BLOOD PRESSURE: 150 MMHG

## 2017-11-16 DIAGNOSIS — E03.9 ACQUIRED HYPOTHYROIDISM: ICD-10-CM

## 2017-11-16 DIAGNOSIS — I10 HYPERTENSION, UNSPECIFIED TYPE: ICD-10-CM

## 2017-11-16 DIAGNOSIS — E66.01 MORBID OBESITY WITH BMI OF 40.0-44.9, ADULT: ICD-10-CM

## 2017-11-16 DIAGNOSIS — G47.33 OBSTRUCTIVE SLEEP APNEA: Primary | ICD-10-CM

## 2017-11-16 PROCEDURE — 99999 PR PBB SHADOW E&M-EST. PATIENT-LVL III: CPT | Mod: PBBFAC,,, | Performed by: NURSE PRACTITIONER

## 2017-11-16 PROCEDURE — 99214 OFFICE O/P EST MOD 30 MIN: CPT | Mod: S$GLB,,, | Performed by: NURSE PRACTITIONER

## 2017-11-16 NOTE — PATIENT INSTRUCTIONS
Obstructive Sleep Apnea  Obstructive sleep apnea is a condition that causes your air passages to become narrowed or blocked during sleep. As a result, breathing stops for short periods. Your body wakes up enough for breathing to begin again, though you don't remember it. The cycle of stopped breathing and brief awakenings can repeat dozens of times a night. This prevents the body from getting to the deeper stages of sleep that are needed for good rest and may cause your body's oxygen level to fall.  Signs of sleep apnea include loud snoring, noisy breathing, and gasping sounds during sleep. Daytime symptoms include waking up tired after a full night's sleep, waking up with headaches, feeling very sleepy or falling asleep during the day, and having problems with memory or concentration.  Risk factors for sleep apnea include:  · Being overweight  · Being a man, or a woman in menopause  · Smoking  · Using alcohol or sedating medicines  · Having enlarged structures in the nose or throat  Home care  Lifestyle changes that can help treat snoring and sleep apnea include the following:  · If you are overweight, lose weight. Talk to your healthcare provider about a weight-loss plan for you.  · Avoid alcohol for 3 to 4 hours before bedtime. Avoid sedating medications. Ask your healthcare provider about the medicines you take.  · If you smoke, talk to your healthcare provider about ways to quit.  · Sleep on your side. This can help prevent gravity from pulling relaxed throat tissues into your breathing passages.  · If you have allergies or sinus problems that block your nose, ask your healthcare provider for help.  Follow-up care  Follow up with your healthcare provider, or as advised. A diagnosis of sleep apnea is made with a sleep study. Your healthcare provider can tell you more about this test.  When to seek medical advice  Sleep apnea can make you more likely to have certain health problems. These include high blood  pressure, heart attack, stroke, and sexual dysfunction. If you have sleep apnea, talk to your healthcare provider about the best treatments for you.  Date Last Reviewed: 4/1/2017  © 0933-8987 [employer]. [address] [place] [address], [place] [address]. All rights reserved. This information is not intended as a substitute for professional medical care. Always follow your healthcare professional's instructions.

## 2017-11-17 ENCOUNTER — PATIENT MESSAGE (OUTPATIENT)
Dept: SLEEP MEDICINE | Facility: CLINIC | Age: 57
End: 2017-11-17

## 2017-11-17 ENCOUNTER — PATIENT MESSAGE (OUTPATIENT)
Dept: FAMILY MEDICINE | Facility: CLINIC | Age: 57
End: 2017-11-17

## 2017-11-17 DIAGNOSIS — I10 ESSENTIAL HYPERTENSION: ICD-10-CM

## 2017-11-17 RX ORDER — LISINOPRIL AND HYDROCHLOROTHIAZIDE 20; 25 MG/1; MG/1
1 TABLET ORAL DAILY
Qty: 30 TABLET | Refills: 0 | Status: SHIPPED | OUTPATIENT
Start: 2017-11-17 | End: 2017-11-24 | Stop reason: SDUPTHER

## 2017-11-17 NOTE — TELEPHONE ENCOUNTER
Pt need refill on Lisinopril till mail order comes in to [facility] Mike have none left.

## 2017-11-21 ENCOUNTER — PATIENT MESSAGE (OUTPATIENT)
Dept: SLEEP MEDICINE | Facility: CLINIC | Age: 57
End: 2017-11-21

## 2017-11-22 ENCOUNTER — TELEPHONE (OUTPATIENT)
Dept: SLEEP MEDICINE | Facility: CLINIC | Age: 57
End: 2017-11-22

## 2017-11-22 DIAGNOSIS — G47.33 OSA (OBSTRUCTIVE SLEEP APNEA): Primary | ICD-10-CM

## 2017-11-22 NOTE — TELEPHONE ENCOUNTER
Called and spoke to patient and informed he that Dr. Jenkins places an order for APAP. Mentioned to patient about setting up a 7 week follow-up once she receive her machine. Patient wanted to give us a call to schedule once she get her machine.

## 2017-11-22 NOTE — TELEPHONE ENCOUNTER
Last was seen by Alicia STAPLETON will order APAP 6-18 cm H2O  To DME [facility]:  [phone] - [facility]:  or [phone] (ext [phone])- [place]    Please schedule  7 weeks follow up Np or MD

## 2017-11-24 DIAGNOSIS — E03.9 ACQUIRED HYPOTHYROIDISM: ICD-10-CM

## 2017-11-24 DIAGNOSIS — I10 ESSENTIAL HYPERTENSION: ICD-10-CM

## 2017-11-24 RX ORDER — AMLODIPINE BESYLATE 5 MG/1
TABLET ORAL
Qty: 90 TABLET | Refills: 0 | Status: SHIPPED | OUTPATIENT
Start: 2017-11-24 | End: 2017-11-27 | Stop reason: SDUPTHER

## 2017-11-24 RX ORDER — LISINOPRIL AND HYDROCHLOROTHIAZIDE 20; 25 MG/1; MG/1
TABLET ORAL
Qty: 90 TABLET | Refills: 0 | Status: SHIPPED | OUTPATIENT
Start: 2017-11-24 | End: 2018-03-11 | Stop reason: SDUPTHER

## 2017-11-24 RX ORDER — LEVOTHYROXINE SODIUM 112 UG/1
TABLET ORAL
Qty: 90 TABLET | Refills: 0 | Status: SHIPPED | OUTPATIENT
Start: 2017-11-24 | End: 2017-11-27 | Stop reason: SDUPTHER

## 2017-11-25 ENCOUNTER — PATIENT MESSAGE (OUTPATIENT)
Dept: FAMILY MEDICINE | Facility: CLINIC | Age: 57
End: 2017-11-25

## 2017-11-25 DIAGNOSIS — I10 ESSENTIAL HYPERTENSION: ICD-10-CM

## 2017-11-25 DIAGNOSIS — E03.9 ACQUIRED HYPOTHYROIDISM: ICD-10-CM

## 2017-11-27 ENCOUNTER — TELEPHONE (OUTPATIENT)
Dept: FAMILY MEDICINE | Facility: CLINIC | Age: 57
End: 2017-11-27

## 2017-11-27 RX ORDER — LEVOTHYROXINE SODIUM 112 UG/1
TABLET ORAL
Qty: 30 TABLET | Refills: 0 | Status: SHIPPED | OUTPATIENT
Start: 2017-11-27 | End: 2018-03-11 | Stop reason: SDUPTHER

## 2017-11-27 RX ORDER — AMLODIPINE BESYLATE 5 MG/1
5 TABLET ORAL DAILY
Qty: 30 TABLET | Refills: 0 | Status: SHIPPED | OUTPATIENT
Start: 2017-11-27 | End: 2018-03-11 | Stop reason: SDUPTHER

## 2017-11-27 NOTE — TELEPHONE ENCOUNTER
Pt is to return on 03/12/2018 need refills, pt is out need refill sent to local pharmacy til mail order arrives.

## 2018-01-22 ENCOUNTER — TELEPHONE (OUTPATIENT)
Dept: SLEEP MEDICINE | Facility: CLINIC | Age: 58
End: 2018-01-22

## 2018-01-22 NOTE — TELEPHONE ENCOUNTER
----- Message from Kristen Beauchamp sent at 1/22/2018  2:57 PM CST -----  Contact: THAI CAMERON [[id number]]  x_  1st Request  _  2nd Request  _  3rd Request        Who: THAI CAMERON [[id number]]    Why: patient states she would like a call back in regards to the setting on her machine. She states she feels the pressure is really high and she is not getting a good night rest. Patient is also having a hard time with the mask as well. Patient would like to know if she can turn the pressure to 4. Please advise.     What Number to Call Back: [phone]    When to Expect a call back: (Before the end of the day)   -- if call after 3:00 call back will be tomorrow.

## 2018-01-23 ENCOUNTER — TELEPHONE (OUTPATIENT)
Dept: SLEEP MEDICINE | Facility: CLINIC | Age: 58
End: 2018-01-23

## 2018-01-23 NOTE — TELEPHONE ENCOUNTER
Patient would to speak with someone in regards to her cpap machine. She states it is blowing to much pressure. Please call at your earliest convenience.

## 2018-01-25 ENCOUNTER — OFFICE VISIT (OUTPATIENT)
Dept: SLEEP MEDICINE | Facility: CLINIC | Age: 58
End: 2018-01-25
Payer: COMMERCIAL

## 2018-01-25 VITALS
HEIGHT: 60 IN | WEIGHT: 207.81 LBS | BODY MASS INDEX: 40.8 KG/M2 | SYSTOLIC BLOOD PRESSURE: 113 MMHG | HEART RATE: 75 BPM | DIASTOLIC BLOOD PRESSURE: 77 MMHG

## 2018-01-25 DIAGNOSIS — G47.33 OBSTRUCTIVE SLEEP APNEA: Primary | ICD-10-CM

## 2018-01-25 DIAGNOSIS — I10 HYPERTENSION, UNSPECIFIED TYPE: ICD-10-CM

## 2018-01-25 DIAGNOSIS — E66.01 MORBID OBESITY WITH BMI OF 40.0-44.9, ADULT: ICD-10-CM

## 2018-01-25 PROCEDURE — 99999 PR PBB SHADOW E&M-EST. PATIENT-LVL III: CPT | Mod: PBBFAC,,, | Performed by: NURSE PRACTITIONER

## 2018-01-25 PROCEDURE — 99214 OFFICE O/P EST MOD 30 MIN: CPT | Mod: SA,S$GLB,, | Performed by: NURSE PRACTITIONER

## 2018-01-26 NOTE — PROGRESS NOTES
Digna Bhatia  was seen as f/u for mgt of KELLY.    Since last seen 11/16/17, she has been setup with apap (12/6). Used it okay initially but then due to stress of job loss and cold symptoms she stopped altogether. First nights mask was quiet/slept more undisturbed. She has resumed use and  thinks she has mask fit wrong/hears noise and pressure too high so she re-ramps.She now dreads going to bed, wonders if she is making using it more difficult than it is. ESS=5. Has lost 3#. Has not been using chin strap or mouth guard.     Interrogation- 90% tile 10cm. AHI 16.9, 0% periodic. Wearing Nuance mask upside down  DATE RANGE: 1/15/2018 - 1/15/2018  4hrs. 15 mins. 9 secs.  Average AHI: 9.2  Average OA Index: 3.1  Average CA Index: 3.5  90% tile 9.5cm      HISTORY:  07/26/2017 INITIAL HISTORY OF PRESENT ILLNESS:  Digna Bhatia is a 57 y.o. female is here to be evaluated for a sleep disorder.       CHIEF COMPLAINT:      The patient's complaints include excessive daytime sleepiness, excessive daytime fatigue, snoring,  witnessed breathing pauses,  gasping for air in sleep and interrupted sleep since  Many years ago.    She had PSG 5 years ago. Was diagnosed with KELLY. She could not tolerate CPAP machine (FFM or nasal pillows). It was over 5 years ago    Has to wear  for teeth grinding - not very compliant with wearing it.    Denies  dry mouth and sore throat  Denies nasal congestion   Denies  morning headaches  Denies  interrupted sleep  Denies frequent leg movements  Denies symptoms concerning for parasomnia    The ESS ([place] Sleepiness Score) taken on initial visit is 5 /24 11/16/17:   She has since undergone a home sleep study which we reviewed together today in detail. Was intolerable to cpap mask in the past. +teeth grinding. Denies am headaches. +snoring, + disrupted sleep. +apneic pauses. She is serious now about sleeping better. Her sleep sucks. Has machine at home, not sure if can still  use or not, would need new supplies.        ROS: In addition to above, 5# gain. Otherwise a balance review of 10-systems is negative.     PHYSICAL EXAM:  /77   Pulse 75   Ht 5' (1.524 m)   Wt 94.3 kg (207 lb 12.8 oz)   BMI 40.58 kg/m²   GENERAL: morbid obese body habitus, well groomed.      Using [facility]: yes   HST AHI 33(RDI 46)/Min SpO2: 77.2%      ASSESSMENT:    1. KELLY (obstructive sleep apnea), severe. Ready to retrial using PAP therapy with adherence monitoring. 1/25/18: Adherent but limited due to job loss stress/cold symptoms, when using having mask leak/pressure is too high, having to lower it. With 4+hr use recently and 0% mask leak, residual elevated ahi/low % periodic  She has  medical co-morbidities of hyperlipidemia, morbid obesity, and hypertension,  which can be worsened by KELLY.      PLAN:   Discussed etiology of KELLY, goal of therapy (AHI<5)  and potential ramifications of untreated KELLY, including heart disease, hypertension, cognitive difficulties, stroke, and diabetes.      Encouraged continued weight loss efforts for potential improvement of KELLY and overall health benefits  See PCP re: hypothyroidism, hyperlipidemia and BP mgt    APAP continue but adjusted 6-13cm. Showed how to wear properly. [facility] DME supplies    RTC 4 wks adherence, consider cpap titration study if persistent elevated ahi/periodic or pressure intolerance.

## 2018-02-22 ENCOUNTER — OFFICE VISIT (OUTPATIENT)
Dept: SLEEP MEDICINE | Facility: CLINIC | Age: 58
End: 2018-02-22
Payer: COMMERCIAL

## 2018-02-22 VITALS
BODY MASS INDEX: 41.09 KG/M2 | SYSTOLIC BLOOD PRESSURE: 116 MMHG | HEIGHT: 60 IN | HEART RATE: 80 BPM | WEIGHT: 209.31 LBS | DIASTOLIC BLOOD PRESSURE: 77 MMHG

## 2018-02-22 DIAGNOSIS — G47.33 OBSTRUCTIVE SLEEP APNEA: Primary | ICD-10-CM

## 2018-02-22 DIAGNOSIS — I10 HYPERTENSION, UNSPECIFIED TYPE: ICD-10-CM

## 2018-02-22 DIAGNOSIS — E66.01 MORBID OBESITY WITH BMI OF 40.0-44.9, ADULT: ICD-10-CM

## 2018-02-22 PROCEDURE — 99999 PR PBB SHADOW E&M-EST. PATIENT-LVL III: CPT | Mod: PBBFAC,,, | Performed by: NURSE PRACTITIONER

## 2018-02-22 PROCEDURE — 99214 OFFICE O/P EST MOD 30 MIN: CPT | Mod: SA,S$GLB,, | Performed by: NURSE PRACTITIONER

## 2018-02-22 PROCEDURE — 3008F BODY MASS INDEX DOCD: CPT | Mod: S$GLB,,, | Performed by: NURSE PRACTITIONER

## 2018-02-22 NOTE — PROGRESS NOTES
Digna Bhatia  was seen as f/u for mgt of KELLY.    Since seen she continues to use apap set 6-13cm Quiet in bedroom. Having less stress and anxiety about using therapy. Mask fitting properly now, no more leaks. Avoids daytime napping. Since no work yet staying up much later 2-3a. Defers sleep meds.   30davg 3.1h/n. AHI 8, 100% mask fit, 11/30d>4h. 90% tile 9-9.5cm, 0-1% periodic    HISTORY:  07/26/2017 INITIAL HISTORY OF PRESENT ILLNESS:  Digna Bhatia is a 57 y.o. female is here to be evaluated for a sleep disorder.       CHIEF COMPLAINT:      The patient's complaints include excessive daytime sleepiness, excessive daytime fatigue, snoring,  witnessed breathing pauses,  gasping for air in sleep and interrupted sleep since  Many years ago.    She had PSG 5 years ago. Was diagnosed with KELLY. She could not tolerate CPAP machine (FFM or nasal pillows). It was over 5 years ago    Has to wear  for teeth grinding - not very compliant with wearing it.    Denies  dry mouth and sore throat  Denies nasal congestion   Denies  morning headaches  Denies  interrupted sleep  Denies frequent leg movements  Denies symptoms concerning for parasomnia    The ESS ([place] Sleepiness Score) taken on initial visit is 5 /24 11/16/17:   She has since undergone a home sleep study which we reviewed together today in detail. Was intolerable to cpap mask in the past. +teeth grinding. Denies am headaches. +snoring, + disrupted sleep. +apneic pauses. She is serious now about sleeping better. Her sleep sucks. Has machine at home, not sure if can still use or not, would need new supplies.     1/25/18:    Since last seen 11/16/17, she has been setup with apap (12/6). Used it okay initially but then due to stress of job loss and cold symptoms she stopped altogether. First nights mask was quiet/slept more undisturbed. She has resumed use and  thinks she has mask fit wrong/hears noise and pressure too high so she re-ramps.She  now dreads going to bed, wonders if she is making using it more difficult than it is. ESS=5. Has lost 3#. Has not been using chin strap or mouth guard.     Interrogation- 90% tile 10cm. AHI 16.9, 0% periodic. Wearing Nuance mask upside down  DATE RANGE: 1/15/2018 - 1/15/2018  4hrs. 15 mins. 9 secs.  Average AHI: 9.2  Average OA Index: 3.1  Average CA Index: 3.5  90% tile 9.5cm         ROS: In addition to above, 2# gain. Otherwise a balance review of 10-systems is negative.     PHYSICAL EXAM:  /77   Pulse 80   Ht 5' (1.524 m)   Wt 94.9 kg (209 lb 4.8 oz)   BMI 40.88 kg/m²   GENERAL: morbid obese body habitus, well groomed.      Using [facility]: yes   HST AHI 33(RDI 46)/Min SpO2: 77.2%      ASSESSMENT:    1. KELLY (obstructive sleep apnea), severe. Ready to retrial using PAP therapy with adherence monitoring. 1/25/18: Adherent but limited due to job loss stress/cold symptoms, when using having mask leak/pressure is too high, having to lower it. With 4+hr use recently and 0% mask leak, residual elevated ahi/low % periodic 2/22/18: Remains adherent, symptoms improved. Getting to bed later since not working currently. Likes therapy, benefiting from therapy, mild ahi. Less mask leak now. Needs 30d extension to meet ins guidelines  She has  medical co-morbidities of hyperlipidemia, morbid obesity, and hypertension,  which can be worsened by KELLY.      PLAN:   Discussed etiology of KELLY, goal of therapy (AHI<5)  and potential ramifications of untreated KELLY, including heart disease, hypertension, cognitive difficulties, stroke, and diabetes.      Encouraged continued weight loss efforts for potential improvement of KELLY and overall health benefits  See PCP re: hypothyroidism, hyperlipidemia and BP mgt    APAP continue 6-13cm.  [facility] D[facility] supplies. REQUEST 30d extension to meet ins guidelines (use next 13 nights 4h and will meet)    RTC 3-mos adherence

## 2018-03-11 DIAGNOSIS — I10 ESSENTIAL HYPERTENSION: ICD-10-CM

## 2018-03-11 DIAGNOSIS — E03.9 ACQUIRED HYPOTHYROIDISM: ICD-10-CM

## 2018-03-11 DIAGNOSIS — E78.5 HYPERLIPIDEMIA, UNSPECIFIED HYPERLIPIDEMIA TYPE: ICD-10-CM

## 2018-03-12 RX ORDER — LISINOPRIL AND HYDROCHLOROTHIAZIDE 20; 25 MG/1; MG/1
TABLET ORAL
Qty: 90 TABLET | Refills: 0 | Status: SHIPPED | OUTPATIENT
Start: 2018-03-12 | End: 2018-06-27 | Stop reason: SDUPTHER

## 2018-03-12 RX ORDER — ROSUVASTATIN CALCIUM 10 MG/1
TABLET, COATED ORAL
Qty: 90 TABLET | Refills: 0 | Status: SHIPPED | OUTPATIENT
Start: 2018-03-12 | End: 2018-06-27 | Stop reason: SDUPTHER

## 2018-03-12 RX ORDER — AMLODIPINE BESYLATE 5 MG/1
TABLET ORAL
Qty: 90 TABLET | Refills: 0 | Status: SHIPPED | OUTPATIENT
Start: 2018-03-12 | End: 2018-06-27 | Stop reason: SDUPTHER

## 2018-03-12 RX ORDER — LEVOTHYROXINE SODIUM 112 UG/1
TABLET ORAL
Qty: 90 TABLET | Refills: 0 | Status: SHIPPED | OUTPATIENT
Start: 2018-03-12 | End: 2018-06-27 | Stop reason: SDUPTHER

## 2018-03-16 ENCOUNTER — OFFICE VISIT (OUTPATIENT)
Dept: FAMILY MEDICINE | Facility: CLINIC | Age: 58
End: 2018-03-16
Payer: COMMERCIAL

## 2018-03-16 VITALS
WEIGHT: 211.19 LBS | HEART RATE: 73 BPM | SYSTOLIC BLOOD PRESSURE: 108 MMHG | OXYGEN SATURATION: 98 % | TEMPERATURE: 98 F | DIASTOLIC BLOOD PRESSURE: 68 MMHG | BODY MASS INDEX: 41.46 KG/M2 | HEIGHT: 60 IN

## 2018-03-16 DIAGNOSIS — I10 ESSENTIAL HYPERTENSION: Primary | ICD-10-CM

## 2018-03-16 DIAGNOSIS — K76.0 NAFLD (NONALCOHOLIC FATTY LIVER DISEASE): ICD-10-CM

## 2018-03-16 DIAGNOSIS — D64.9 ANEMIA, UNSPECIFIED TYPE: ICD-10-CM

## 2018-03-16 DIAGNOSIS — E03.9 ACQUIRED HYPOTHYROIDISM: ICD-10-CM

## 2018-03-16 DIAGNOSIS — E78.5 HYPERLIPIDEMIA, UNSPECIFIED HYPERLIPIDEMIA TYPE: ICD-10-CM

## 2018-03-16 DIAGNOSIS — R73.03 PREDIABETES: ICD-10-CM

## 2018-03-16 PROCEDURE — 3074F SYST BP LT 130 MM HG: CPT | Mod: CPTII,S$GLB,, | Performed by: NURSE PRACTITIONER

## 2018-03-16 PROCEDURE — 99999 PR PBB SHADOW E&M-EST. PATIENT-LVL IV: CPT | Mod: PBBFAC,,, | Performed by: NURSE PRACTITIONER

## 2018-03-16 PROCEDURE — 3078F DIAST BP <80 MM HG: CPT | Mod: CPTII,S$GLB,, | Performed by: NURSE PRACTITIONER

## 2018-03-16 PROCEDURE — 99214 OFFICE O/P EST MOD 30 MIN: CPT | Mod: SA,S$GLB,, | Performed by: NURSE PRACTITIONER

## 2018-03-16 NOTE — PROGRESS NOTES
Subjective:       Patient ID: Digna Bhatia is a 57 y.o. female.    Chief Complaint: Follow-up (lab results)    Patient is a 57-year-old white female here today for 6 month follow-up with fasting lab results.    Patient has hypertension that is currently controlled on present medications, amlodipine and lisinopril HCT.  /68 (BP Location: Right arm, Patient Position: Sitting, BP Method: Large (Manual))   Pulse 73   Temp 97.7 °F (36.5 °C) (Oral)   Ht 5' (1.524 m)   Wt 95.8 kg (211 lb 3.2 oz)   SpO2 98%   BMI 41.25 kg/m²     Patient has hypothyroidism that is controlled on present medication, Synthroid 112 µg daily.  See thyroid labs below.    Patient has prediabetes.  Her fasting blood glucose did improve from 132-116.  Her hemoglobin A1c is 5.7%.  Advised to continue lifestyle modifications.    Patient has hyperlipidemia.  At last visit, we started patient on Crestor 10 mg daily.  Cholesterol is much improved.  Total cholesterol went down from 226 to 137.  Her LDL went down from 151.2 down to 59.2.  See results below.  Next    Patient has always had an elevated toilet bilirubin in the past but her other liver enzymes started to climb in May 2017.  Patient was sent to GI for evaluation.  She has had a negative hepatitis panel and a liver ultrasound in May 2017 showed fatty liver disease.  Patient's bilirubin did improve from 1.8 to 1.6 and her ALT went down from 49 to 40.  Advised patient to continue lifestyle modifications.    Patient reports she has obstructive sleep apnea and she has followed up with the specialists and has now been using her CPAP machine.          Component      Latest Ref Rng & Units 3/9/2018 9/8/2017 5/5/2017   Sodium      136 - 145 mmol/L 139 141 139   Potassium      3.5 - 5.1 mmol/L 4.5 4.3 4.0   Chloride      95 - 110 mmol/L 101 102 106   CO2      23 - 29 mmol/L 30 (H) 28 25   Glucose      70 - 110 mg/dL 116 (H) 132 (H) 128 (H)   BUN, Bld      7 - 17 mg/dL 17 16 18 (H)    Creatinine      0.50 - 1.40 mg/dL 0.82 0.94 0.81   Calcium      8.7 - 10.5 mg/dL 9.6 9.7 9.3   Total Protein      6.0 - 8.4 g/dL 7.3 7.3 7.3   Albumin      3.5 - 5.2 g/dL 4.3 4.7 4.3   Total Bilirubin      0.1 - 1.0 mg/dL 1.6 (H) 1.8 (H) 1.8 (H)   Alkaline Phosphatase      38 - 126 U/L 80 79 84   AST      15 - 46 U/L 25 31 28   ALT      10 - 44 U/L 40 49 (H) 51 (H)   Anion Gap      8 - 16 mmol/L 8 11 8   eGFR if African American      >60 mL/min/1.73 m:2 >60.0 >60.0 >60.0   eGFR if non African American      >60 mL/min/1.73 m:2 >60.0 >60.0 >60.0   Cholesterol      120 - 199 mg/dL 137 226 (H) 226 (H)   Triglycerides      30 - 150 mg/dL 94 94 107   HDL      40 - 75 mg/dL 59 56 49   LDL Cholesterol      63.0 - 159.0 mg/dL 59.2 (L) 151.2 155.6   HDL/Chol Ratio      20.0 - 50.0 % 43.1 24.8 21.7   Total Cholesterol/HDL Ratio      2.0 - 5.0 2.3 4.0 4.6   Non-HDL Cholesterol      mg/dL 78 170 177   Hemoglobin A1C      4.0 - 5.6 % 5.7 (H) 5.6 5.5   Estimated Avg Glucose      68 - 131 mg/dL 117 114 111   TSH      0.400 - 4.000 uIU/mL 1.810 1.920 2.650   Free T4      0.71 - 1.51 ng/dL 1.08 1.33 1.09       Previous Medications    AMLODIPINE (NORVASC) 5 MG TABLET    TAKE 1 TABLET DAILY    ASPIRIN (ECOTRIN) 81 MG EC TABLET    Take 81 mg by mouth once daily.    LEVOTHYROXINE (SYNTHROID) 112 MCG TABLET    TAKE 1 TABLET BEFORE BREAKFAST    LISINOPRIL-HYDROCHLOROTHIAZIDE (PRINZIDE,ZESTORETIC) 20-25 MG TAB    TAKE 1 TABLET DAILY    ROSUVASTATIN (CRESTOR) 10 MG TABLET    TAKE 1 TABLET DAILY       Past Medical History:   Diagnosis Date    H/O mammogram 3/31/2016    [facility]    Hypertension     Hyperthyroidism     Hypothyroidism     IFG (impaired fasting glucose)     NAFLD (nonalcoholic fatty liver disease)     KELLY (obstructive sleep apnea)     did not tolerate any of the masks =        Past Surgical History:   Procedure Laterality Date     SECTION      COLONOSCOPY N/A 2016    Procedure: COLONOSCOPY-Miralax  split prep ;  Surgeon: Cali Oliveira Jr., MD;  Location: [facility] END[facility];  Service: Endoscopy;  Laterality: N/A;    DILATION AND CURETTAGE OF UTERUS         Family History   Problem Relation Age of Onset    Dementia Mother     Parkinsonism Mother      PASSED AGE 78    Hypertension Father     Cancer Father      KIDNEY PASSED AGE 70    Heart disease Father 50     HEART ATTACK    Diabetes Father     Hypertension Sister     Diabetes Brother     Hypertension Brother     Diabetes Sister     Hypertension Sister     Hypertension Sister     Hypertension Brother     No Known Problems Daughter     No Known Problems Son     No Known Problems Son        Social History     Social History    Marital status:      Spouse name: N/A    Number of children: N/A    Years of education: N/A     Social History Main Topics    Smoking status: Never Smoker    Smokeless tobacco: Never Used    Alcohol use Yes      Comment: rare    Drug use: No    Sexual activity: Not Asked     Other Topics Concern    None     Social History Narrative    None       Review of Systems   Constitutional: Negative for appetite change, chills, fatigue, fever and unexpected weight change.   HENT: Negative for congestion, ear pain, mouth sores, nosebleeds, postnasal drip, rhinorrhea, sinus pressure, sneezing, sore throat, trouble swallowing and voice change.    Eyes: Negative for photophobia, pain, discharge, redness, itching and visual disturbance.   Respiratory: Negative for cough, chest tightness and shortness of breath.    Cardiovascular: Negative for chest pain, palpitations and leg swelling.   Gastrointestinal: Negative for abdominal pain, blood in stool, constipation, diarrhea, nausea and vomiting.   Genitourinary: Negative for dysuria, frequency, hematuria and urgency.   Musculoskeletal: Negative for arthralgias, back pain, joint swelling and myalgias.   Skin: Negative for color change and rash.   Allergic/Immunologic: Negative  for immunocompromised state.   Neurological: Negative for dizziness, seizures, syncope, weakness and headaches.   Hematological: Negative for adenopathy. Does not bruise/bleed easily.   Psychiatric/Behavioral: Negative for agitation, dysphoric mood, sleep disturbance and suicidal ideas. The patient is not nervous/anxious.          Objective:     Vitals:    03/16/18 1310   BP: 108/68   BP Location: Right arm   Patient Position: Sitting   BP Method: Large (Manual)   Pulse: 73   Temp: 97.7 °F (36.5 °C)   TempSrc: Oral   SpO2: 98%   Weight: 95.8 kg (211 lb 3.2 oz)   Height: 5' (1.524 m)          Physical Exam   Constitutional: She is oriented to person, place, and time. She appears well-developed and well-nourished.   + obesity with Body mass index is 41.25 kg/m².       HENT:   Head: Normocephalic and atraumatic.   Right Ear: External ear normal.   Left Ear: External ear normal.   Nose: Nose normal.   Mouth/Throat: Oropharynx is clear and moist. No oropharyngeal exudate.   Eyes: EOM are normal. Pupils are equal, round, and reactive to light.   Neck: Normal range of motion. Neck supple. No JVD present. No tracheal deviation present. No thyromegaly present.   Cardiovascular: Normal rate, regular rhythm and normal heart sounds.    No murmur heard.  Pulmonary/Chest: Effort normal and breath sounds normal. No stridor. No respiratory distress. She has no wheezes. She has no rales.   Abdominal: Soft. She exhibits no distension. There is no tenderness. There is no guarding.   Musculoskeletal: Normal range of motion. She exhibits no edema.   Lymphadenopathy:     She has no cervical adenopathy.   Neurological: She is alert and oriented to person, place, and time. No cranial nerve deficit. Coordination normal.   Skin: Skin is warm and dry. No rash noted.   Psychiatric: She has a normal mood and affect.         Assessment:         ICD-10-CM ICD-9-CM   1. Essential hypertension I10 401.9   2. Hyperlipidemia, unspecified  hyperlipidemia type E78.5 272.4   3. Acquired hypothyroidism E03.9 244.9   4. NAFLD (nonalcoholic fatty liver disease) K76.0 571.8   5. Prediabetes R73.03 790.29   6. Anemia, unspecified type D64.9 285.9       Plan:       Essential hypertension  -  Controlled on present medications.  Recheck in 6 months.    Hyperlipidemia, unspecified hyperlipidemia type   - Much improved on current medication.  Recheck fasting labs and office visit in 6 months.  -     Lipid panel; Future; Expected date: 09/16/2018    Acquired hypothyroidism  -  Controlled on present medication  -     TSH; Future; Expected date: 09/16/2018  -     T4, free; Future; Expected date: 09/16/2018    NAFLD (nonalcoholic fatty liver disease)  -  Continue lifestyle modifications    Prediabetes  -  Needs stricter lifestyle modifications.  -     Comprehensive metabolic panel; Future; Expected date: 09/16/2018  -     Hemoglobin A1c; Future; Expected date: 09/16/2018    Anemia, unspecified type  -     CBC auto differential; Future; Expected date: 09/16/2018      Follow-up in about 6 months (around 9/16/2018) for fasting labs and WELLNESS EXAM.     Patient's Medications   New Prescriptions    No medications on file   Previous Medications    AMLODIPINE (NORVASC) 5 MG TABLET    TAKE 1 TABLET DAILY    ASPIRIN (ECOTRIN) 81 MG EC TABLET    Take 81 mg by mouth once daily.    LEVOTHYROXINE (SYNTHROID) 112 MCG TABLET    TAKE 1 TABLET BEFORE BREAKFAST    LISINOPRIL-HYDROCHLOROTHIAZIDE (PRINZIDE,ZESTORETIC) 20-25 MG TAB    TAKE 1 TABLET DAILY    ROSUVASTATIN (CRESTOR) 10 MG TABLET    TAKE 1 TABLET DAILY   Modified Medications    No medications on file   Discontinued Medications    No medications on file

## 2018-06-11 NOTE — TELEPHONE ENCOUNTER
----- Message from Kymberly Barba NP sent at 5/8/2017  7:31 PM CDT -----  Call [facility] for old paper chart - sleep study for sleep apnea.   98.4

## 2018-06-27 DIAGNOSIS — E78.5 HYPERLIPIDEMIA, UNSPECIFIED HYPERLIPIDEMIA TYPE: ICD-10-CM

## 2018-06-27 DIAGNOSIS — E03.9 ACQUIRED HYPOTHYROIDISM: ICD-10-CM

## 2018-06-27 DIAGNOSIS — I10 ESSENTIAL HYPERTENSION: ICD-10-CM

## 2018-06-28 ENCOUNTER — PATIENT MESSAGE (OUTPATIENT)
Dept: FAMILY MEDICINE | Facility: CLINIC | Age: 58
End: 2018-06-28

## 2018-06-28 DIAGNOSIS — Z12.39 BREAST CANCER SCREENING: Primary | ICD-10-CM

## 2018-06-28 RX ORDER — ROSUVASTATIN CALCIUM 10 MG/1
TABLET, COATED ORAL
Qty: 90 TABLET | Refills: 0 | Status: SHIPPED | OUTPATIENT
Start: 2018-06-28 | End: 2018-10-09 | Stop reason: SDUPTHER

## 2018-06-28 RX ORDER — LEVOTHYROXINE SODIUM 112 UG/1
TABLET ORAL
Qty: 90 TABLET | Refills: 0 | Status: SHIPPED | OUTPATIENT
Start: 2018-06-28 | End: 2018-10-09 | Stop reason: SDUPTHER

## 2018-06-28 RX ORDER — LISINOPRIL AND HYDROCHLOROTHIAZIDE 20; 25 MG/1; MG/1
TABLET ORAL
Qty: 90 TABLET | Refills: 0 | Status: SHIPPED | OUTPATIENT
Start: 2018-06-28 | End: 2018-10-09 | Stop reason: SDUPTHER

## 2018-06-28 RX ORDER — AMLODIPINE BESYLATE 5 MG/1
TABLET ORAL
Qty: 90 TABLET | Refills: 0 | Status: SHIPPED | OUTPATIENT
Start: 2018-06-28 | End: 2018-10-09 | Stop reason: SDUPTHER

## 2018-08-23 ENCOUNTER — OFFICE VISIT (OUTPATIENT)
Dept: OBSTETRICS AND GYNECOLOGY | Facility: CLINIC | Age: 58
End: 2018-08-23
Payer: COMMERCIAL

## 2018-08-23 VITALS — WEIGHT: 212.5 LBS | DIASTOLIC BLOOD PRESSURE: 80 MMHG | SYSTOLIC BLOOD PRESSURE: 126 MMHG | BODY MASS INDEX: 41.51 KG/M2

## 2018-08-23 DIAGNOSIS — Z12.4 SCREENING FOR CERVICAL CANCER: ICD-10-CM

## 2018-08-23 DIAGNOSIS — Z01.419 WELL WOMAN EXAM WITH ROUTINE GYNECOLOGICAL EXAM: Primary | ICD-10-CM

## 2018-08-23 DIAGNOSIS — Z12.39 SCREENING FOR BREAST CANCER: ICD-10-CM

## 2018-08-23 PROCEDURE — 99999 PR PBB SHADOW E&M-EST. PATIENT-LVL III: CPT | Mod: PBBFAC,,, | Performed by: OBSTETRICS & GYNECOLOGY

## 2018-08-23 PROCEDURE — 88175 CYTOPATH C/V AUTO FLUID REDO: CPT

## 2018-08-23 PROCEDURE — 99386 PREV VISIT NEW AGE 40-64: CPT | Mod: S$GLB,,, | Performed by: OBSTETRICS & GYNECOLOGY

## 2018-08-23 NOTE — PROGRESS NOTES
GYNECOLOGY OFFICE NOTE    Reason for visit: annual    HPI: Pt is a 57 y.o.  female  who presents for annual. Menopausal since age 49. Cycle: menarche- 12. She is sexually active. She does not desire STI screening. She denies vaginal discharge.  Last pap: 2017-neg pap/hpv, denies hx of abnormal, desires screening today. Last [facility] 2018- [facility] at [facility]    Past Medical History:   Diagnosis Date    H/O mammogram 3/31/2016    has done at [facility]    Hypertension     Hyperthyroidism     Hypothyroidism     IFG (impaired fasting glucose)     NAFLD (nonalcoholic fatty liver disease)     KELLY (obstructive sleep apnea)     on CPAP       Past Surgical History:   Procedure Laterality Date     SECTION      DILATION AND CURETTAGE OF UTERUS         Family History   Problem Relation Age of Onset    Dementia Mother     Parkinsonism Mother         PASSED AGE 78    Hypertension Father     Cancer Father         KIDNEY PASSED AGE 70    Heart disease Father 50        HEART ATTACK    Diabetes Father     Hypertension Sister     Diabetes Brother     Hypertension Brother     Diabetes Sister     Hypertension Sister     Hypertension Sister     Hypertension Brother     No Known Problems Daughter     No Known Problems Son     No Known Problems Son        Social History     Tobacco Use    Smoking status: Never Smoker    Smokeless tobacco: Never Used   Substance Use Topics    Alcohol use: Yes     Comment: rare    Drug use: No       OB History    Para Term  AB Living   6 3 1 2 3 3   SAB TAB Ectopic Multiple Live Births   3       3      # Outcome Date GA Lbr Gerard/2nd Weight Sex Delivery Anes PTL Lv   6 SAB            5 SAB            4 SAB            3 Term      CS-Unspec   LEANDER   2       CS-Unspec  Y LEANDER   1       CS-Unspec  Y LEANDER          Current Outpatient Medications   Medication Sig    amLODIPine (NORVASC) 5 MG tablet TAKE 1 TABLET DAILY    aspirin (ECOTRIN) 81 MG EC tablet  Take 81 mg by mouth once daily.    levothyroxine (SYNTHROID) 112 MCG tablet TAKE 1 TABLET BEFORE BREAKFAST    lisinopril-hydrochlorothiazide (PRINZIDE,ZESTORETIC) 20-25 mg Tab TAKE 1 TABLET DAILY    rosuvastatin (CRESTOR) 10 MG tablet TAKE 1 TABLET DAILY     No current facility-administered medications for this visit.        Allergies: Bactrim [sulfamethoxazole-trimethoprim] and Pcn [penicillins]     /80   Wt 96.4 kg (212 lb 8.4 oz)   LMP  (LMP Unknown)   BMI 41.51 kg/m²     ROS:  GENERAL: Denies fever or chills.   SKIN: Denies rash or lesions.   HEAD: Denies head injury or headache.   CHEST: Denies chest pain or shortness of breath.   CARDIOVASCULAR: Denies palpitations or chest pain.   ABDOMEN: No abdominal pain, constipation, diarrhea, nausea, vomiting or rectal bleeding.   URINARY: No dysuria, hematuria, or burning on urination.  REPRODUCTIVE: See HPI.   BREASTS: Denies pain, lumps, or nipple discharge.   NEUROLOGIC: Denies syncope or weakness.     Physical Exam:  GENERAL: alert, appears stated age and cooperative  CHEST: Normal respiratory effort  HEART: S1 and S2 normal, regular rate and rhythm  NECK: normal appearance, no thyromegaly masses or tenderness  SKIN: no acne, striae, hirsutism  BREAST EXAM: breasts appear normal, no suspicious masses, no skin or nipple changes or axillary nodes  ABDOMEN: abdomen is soft without significant tenderness, masses, organomegaly or guarding, no hernias noted  EXTERNAL GENITALIA:  normal general appearance  URETHRA: normal urethra, normal urethral meatus  VAGINA:  normal mucosa without prolapse or lesions  CERVIX:  Normal  UTERUS: exam limited by habitus  ADNEXA:  normal adnexa in size, nontender and no masses    Diagnosis:  1. Well woman exam with routine gynecological exam    2. Screening for breast cancer    3. Screening for cervical cancer        Plan:   1. Annual  2. MMG for 2019  3. Pap today    Orders Placed This Encounter    Mammo Digital Screening  Bilat with Tomosynthesis CAD    Liquid-based pap smear, screening       Patient was counseled today on the new ACS guidelines for cervical cytology screening as well as the current recommendations for breast cancer screening. She was counseled to follow up with her PCP for other routine health maintenance.     Follow-up in about 1 year (around 8/23/2019) for annual.      Nikky Araiza MD  OB/GYN  Pager: [phone]

## 2018-09-13 ENCOUNTER — TELEPHONE (OUTPATIENT)
Dept: FAMILY MEDICINE | Facility: CLINIC | Age: 58
End: 2018-09-13

## 2018-09-13 NOTE — TELEPHONE ENCOUNTER
----- Message from Loni Boyd sent at 9/13/2018  2:24 PM CDT -----  Contact: self / [phone]  Patient is requesting a call back, her medications . Please advise

## 2018-09-13 NOTE — TELEPHONE ENCOUNTER
----- Message from Kristen Hoffmann sent at 9/13/2018  4:03 PM CDT -----  Contact: [phone]/self  Patient called in returning your call. Please advise.

## 2018-10-09 ENCOUNTER — PATIENT MESSAGE (OUTPATIENT)
Dept: FAMILY MEDICINE | Facility: CLINIC | Age: 58
End: 2018-10-09

## 2018-10-09 DIAGNOSIS — E78.5 HYPERLIPIDEMIA, UNSPECIFIED HYPERLIPIDEMIA TYPE: ICD-10-CM

## 2018-10-09 DIAGNOSIS — E03.9 ACQUIRED HYPOTHYROIDISM: ICD-10-CM

## 2018-10-09 DIAGNOSIS — I10 ESSENTIAL HYPERTENSION: ICD-10-CM

## 2018-10-09 RX ORDER — ROSUVASTATIN CALCIUM 10 MG/1
TABLET, COATED ORAL
Qty: 90 TABLET | Refills: 1 | Status: SHIPPED | OUTPATIENT
Start: 2018-10-09 | End: 2019-04-05 | Stop reason: SDUPTHER

## 2018-10-09 RX ORDER — AMLODIPINE BESYLATE 5 MG/1
TABLET ORAL
Qty: 90 TABLET | Refills: 1 | Status: SHIPPED | OUTPATIENT
Start: 2018-10-09 | End: 2019-04-05 | Stop reason: SDUPTHER

## 2018-10-09 RX ORDER — LISINOPRIL AND HYDROCHLOROTHIAZIDE 20; 25 MG/1; MG/1
TABLET ORAL
Qty: 90 TABLET | Refills: 1 | Status: SHIPPED | OUTPATIENT
Start: 2018-10-09 | End: 2019-04-05 | Stop reason: SDUPTHER

## 2018-10-09 RX ORDER — LISINOPRIL AND HYDROCHLOROTHIAZIDE 20; 25 MG/1; MG/1
1 TABLET ORAL DAILY
Qty: 30 TABLET | Refills: 0 | Status: SHIPPED | OUTPATIENT
Start: 2018-10-09 | End: 2018-10-11 | Stop reason: SDUPTHER

## 2018-10-09 RX ORDER — AMLODIPINE BESYLATE 5 MG/1
5 TABLET ORAL DAILY
Qty: 30 TABLET | Refills: 0 | Status: SHIPPED | OUTPATIENT
Start: 2018-10-09 | End: 2018-10-11 | Stop reason: SDUPTHER

## 2018-10-09 RX ORDER — LEVOTHYROXINE SODIUM 112 UG/1
TABLET ORAL
Qty: 30 TABLET | Refills: 0 | Status: SHIPPED | OUTPATIENT
Start: 2018-10-09 | End: 2018-10-11 | Stop reason: SDUPTHER

## 2018-10-09 RX ORDER — LEVOTHYROXINE SODIUM 112 UG/1
TABLET ORAL
Qty: 90 TABLET | Refills: 1 | Status: SHIPPED | OUTPATIENT
Start: 2018-10-09 | End: 2019-04-05 | Stop reason: SDUPTHER

## 2018-10-09 RX ORDER — ROSUVASTATIN CALCIUM 10 MG/1
10 TABLET, COATED ORAL DAILY
Qty: 30 TABLET | Refills: 0 | Status: SHIPPED | OUTPATIENT
Start: 2018-10-09 | End: 2018-10-11 | Stop reason: SDUPTHER

## 2018-10-11 ENCOUNTER — OFFICE VISIT (OUTPATIENT)
Dept: FAMILY MEDICINE | Facility: CLINIC | Age: 58
End: 2018-10-11
Payer: COMMERCIAL

## 2018-10-11 VITALS
HEART RATE: 72 BPM | OXYGEN SATURATION: 98 % | HEIGHT: 60 IN | WEIGHT: 211.88 LBS | BODY MASS INDEX: 41.6 KG/M2 | RESPIRATION RATE: 18 BRPM | DIASTOLIC BLOOD PRESSURE: 80 MMHG | SYSTOLIC BLOOD PRESSURE: 120 MMHG | TEMPERATURE: 98 F

## 2018-10-11 DIAGNOSIS — K76.0 NAFLD (NONALCOHOLIC FATTY LIVER DISEASE): ICD-10-CM

## 2018-10-11 DIAGNOSIS — E03.9 ACQUIRED HYPOTHYROIDISM: ICD-10-CM

## 2018-10-11 DIAGNOSIS — R73.01 IFG (IMPAIRED FASTING GLUCOSE): ICD-10-CM

## 2018-10-11 DIAGNOSIS — E66.01 MORBID OBESITY WITH BMI OF 40.0-44.9, ADULT: ICD-10-CM

## 2018-10-11 DIAGNOSIS — Z23 FLU VACCINE NEED: ICD-10-CM

## 2018-10-11 DIAGNOSIS — E78.5 HYPERLIPIDEMIA, UNSPECIFIED HYPERLIPIDEMIA TYPE: ICD-10-CM

## 2018-10-11 DIAGNOSIS — G47.33 OSA (OBSTRUCTIVE SLEEP APNEA): ICD-10-CM

## 2018-10-11 DIAGNOSIS — Z00.00 ANNUAL PHYSICAL EXAM: Primary | ICD-10-CM

## 2018-10-11 DIAGNOSIS — I10 ESSENTIAL HYPERTENSION: ICD-10-CM

## 2018-10-11 PROCEDURE — 3074F SYST BP LT 130 MM HG: CPT | Mod: CPTII,S$GLB,, | Performed by: NURSE PRACTITIONER

## 2018-10-11 PROCEDURE — 90686 IIV4 VACC NO PRSV 0.5 ML IM: CPT | Mod: S$GLB,,, | Performed by: NURSE PRACTITIONER

## 2018-10-11 PROCEDURE — 99999 PR PBB SHADOW E&M-EST. PATIENT-LVL IV: CPT | Mod: PBBFAC,,, | Performed by: NURSE PRACTITIONER

## 2018-10-11 PROCEDURE — 90471 IMMUNIZATION ADMIN: CPT | Mod: S$GLB,,, | Performed by: NURSE PRACTITIONER

## 2018-10-11 PROCEDURE — 99396 PREV VISIT EST AGE 40-64: CPT | Mod: 25,S$GLB,, | Performed by: NURSE PRACTITIONER

## 2018-10-11 PROCEDURE — 3079F DIAST BP 80-89 MM HG: CPT | Mod: CPTII,S$GLB,, | Performed by: NURSE PRACTITIONER

## 2018-10-12 NOTE — PROGRESS NOTES
Subjective:       Patient ID: Digna Bhatia is a 57 y.o. female.    Chief Complaint: Annual Exam    Patient is a 57-year-old white female with hypertension, hyperlipidemia, hypothyroidism, impaired fasting glucose/prediabetes, fatty liver disease noted on ultrasound May 2017 with elevated liver enzymes, and obstructive sleep apnea with CPAP use that is here today for annual physical exam with fasting lab results.    Patient has hypertension that is currently controlled on present medications, amlodipine and lisinopril HCT.  /80   Pulse 72   Temp 98.3 °F (36.8 °C) (Oral)   Resp 18   Ht 5' (1.524 m)   Wt 96.1 kg (211 lb 13.8 oz)   LMP  (LMP Unknown)   SpO2 98%   BMI 41.38 kg/m²      Patient has hypothyroidism that is controlled on present medication, Synthroid 112 µg daily.  See thyroid labs below.     Patient has prediabetes.  Her fasting blood glucose is now 135.  Her hemoglobin A1c is 5.9%.  Advised to continue lifestyle modifications.     Patient has hyperlipidemia controlled on Crestor 10 mg daily.  LDL 70.4     Patient has always had an elevated total bilirubin in the past but her other liver enzymes started to climb in May 2017.  Patient was sent to GI for evaluation.  She has had a negative hepatitis panel and a liver ultrasound in May 2017 showed fatty liver disease.  Patient's bilirubin did improve from 1.8 to 1.6 and her other liver enzymes have returned to normal range..  Advised patient to continue lifestyle modifications.     Patient reports she has obstructive sleep apnea and she has followed up with the specialists and has now been using her CPAP machine.     Wellness Labs:  -  CBC WNL  -  CMP WNL other than glucose 135 and A1C 5.9%, liver enzymes have returned to normal other than bilirubin 1.2  -  Cholesterol controlled on present medication  -  Thyroid controlled on present medication        Component      Latest Ref Rng & Units 10/4/2018 3/9/2018 9/8/2017   WBC      3.90 - 12.70  K/uL 6.26  6.85   RBC      4.00 - 5.40 M/uL 4.37  4.54   Hemoglobin      12.0 - 16.0 g/dL 13.1  13.5   Hematocrit      37.0 - 48.5 % 38.1  39.4   MCV      82 - 98 fL 87  87   MCH      27.0 - 31.0 pg 30.0  29.7   MCHC      32.0 - 36.0 g/dL 34.4  34.3   RDW      11.5 - 14.5 % 13.3  13.6   Platelets      150 - 350 K/uL 237  274   MPV      9.2 - 12.9 fL 10.7  10.2   Gran # (ANC)      1.8 - 7.7 K/uL 3.9  4.6   Lymph #      1.0 - 4.8 K/uL 1.3  1.2   Mono #      0.3 - 1.0 K/uL 0.7  0.7   Eos #      0.0 - 0.5 K/uL 0.2  0.3   Baso #      0.00 - 0.20 K/uL 0.11  0.10   Gran%      38.0 - 73.0 % 61.6  66.7   Lymph%      18.0 - 48.0 % 21.4  18.0   Mono%      4.0 - 15.0 % 11.5  10.2   Eosinophil%      0.0 - 8.0 % 3.7  3.6   Basophil%      0.0 - 1.9 % 1.8  1.5   Differential Method       Automated  Automated   Sodium      136 - 145 mmol/L 138 139 141   Potassium      3.5 - 5.1 mmol/L 4.3 4.5 4.3   Chloride      95 - 110 mmol/L 102 101 102   CO2      23 - 29 mmol/L 28 30 (H) 28   Glucose      70 - 110 mg/dL 135 (H) 116 (H) 132 (H)   BUN, Bld      7 - 17 mg/dL 20 (H) 17 16   Creatinine      0.50 - 1.40 mg/dL 0.77 0.82 0.94   Calcium      8.7 - 10.5 mg/dL 9.4 9.6 9.7   Total Protein      6.0 - 8.4 g/dL 7.1 7.3 7.3   Albumin      3.5 - 5.2 g/dL 4.2 4.3 4.7   Total Bilirubin      0.1 - 1.0 mg/dL 1.2 (H) 1.6 (H) 1.8 (H)   Alkaline Phosphatase      38 - 126 U/L 79 80 79   AST      15 - 46 U/L 22 25 31   ALT      10 - 44 U/L 24 40 49 (H)   Anion Gap      8 - 16 mmol/L 8 8 11   eGFR if African American      >60 mL/min/1.73 m:2 >60.0 >60.0 >60.0   eGFR if non African American      >60 mL/min/1.73 m:2 >60.0 >60.0 >60.0   Cholesterol      120 - 199 mg/dL 135 137 226 (H)   Triglycerides      30 - 150 mg/dL 73 94 94   HDL      40 - 75 mg/dL 50 59 56   LDL Cholesterol      63.0 - 159.0 mg/dL 70.4 59.2 (L) 151.2   HDL/Chol Ratio      20.0 - 50.0 % 37.0 43.1 24.8   Total Cholesterol/HDL Ratio      2.0 - 5.0 2.7 2.3 4.0   Non-HDL Cholesterol       mg/dL 85 78 170   Hemoglobin A1C      4.0 - 5.6 % 5.8 (H) 5.7 (H) 5.6   Estimated Avg Glucose      68 - 131 mg/dL 120 117 114   TSH      0.400 - 4.000 uIU/mL 1.430 1.810 1.920   Free T4      0.71 - 1.51 ng/dL 1.10 1.08 1.33       Current Outpatient Medications   Medication Sig Dispense Refill    amLODIPine (NORVASC) 5 MG tablet TAKE 1 TABLET DAILY 90 tablet 1    aspirin (ECOTRIN) 81 MG EC tablet Take 81 mg by mouth once daily.      levothyroxine (SYNTHROID) 112 MCG tablet TAKE 1 TABLET BEFORE BREAKFAST 90 tablet 1    lisinopril-hydrochlorothiazide (PRINZIDE,ZESTORETIC) 20-25 mg Tab TAKE 1 TABLET DAILY 90 tablet 1    rosuvastatin (CRESTOR) 10 MG tablet TAKE 1 TABLET DAILY 90 tablet 1     No current facility-administered medications for this visit.        Past Medical History:   Diagnosis Date    H/O mammogram 3/31/2016    [facility]    Hyperlipidemia 2017    Hypertension     Hyperthyroidism     Hypothyroidism     IFG (impaired fasting glucose)     NAFLD (nonalcoholic fatty liver disease)     KELLY (obstructive sleep apnea)     on CPAP    Screening for colorectal cancer 2016       Past Surgical History:   Procedure Laterality Date     SECTION      COLONOSCOPY N/A 2016    Procedure: COLONOSCOPY-Miralax split prep ;  Surgeon: Cali Oliveira Jr., MD;  Location: [facility];  Service: Endoscopy;  Laterality: N/A;    COLONOSCOPY-Miralax split prep  N/A 2016    Performed by Cali Oliveira Jr., MD at [facility] ENDO    DILATION AND CURETTAGE OF UTERUS         Family History   Problem Relation Age of Onset    Dementia Mother     Parkinsonism Mother         PASSED AGE 78    Hypertension Father     Cancer Father         KIDNEY PASSED AGE 70    Heart disease Father 50        HEART ATTACK    Diabetes Father     Hypertension Sister     Diabetes Brother     Hypertension Brother     Diabetes Sister     Hypertension Sister     Hypertension Sister     Hypertension Brother      No Known Problems Daughter     No Known Problems Son     No Known Problems Son        Social History     Socioeconomic History    Marital status:      Spouse name: None    Number of children: None    Years of education: None    Highest education level: None   Social Needs    Financial resource strain: None    Food insecurity - worry: None    Food insecurity - inability: None    Transportation needs - medical: None    Transportation needs - non-medical: None   Occupational History    None   Tobacco Use    Smoking status: Never Smoker    Smokeless tobacco: Never Used   Substance and Sexual Activity    Alcohol use: Yes     Comment: rare    Drug use: No    Sexual activity: Not Currently   Other Topics Concern    None   Social History Narrative    None       Review of Systems   Constitutional: Negative for appetite change, chills, fatigue, fever and unexpected weight change.   HENT: Negative for congestion, ear pain, mouth sores, nosebleeds, postnasal drip, rhinorrhea, sinus pressure, sneezing, sore throat, trouble swallowing and voice change.    Eyes: Negative for photophobia, pain, discharge, redness, itching and visual disturbance.   Respiratory: Negative for cough, chest tightness and shortness of breath.    Cardiovascular: Negative for chest pain, palpitations and leg swelling.   Gastrointestinal: Negative for abdominal pain, blood in stool, constipation, diarrhea, nausea and vomiting.   Genitourinary: Negative for dysuria, frequency, hematuria and urgency.   Musculoskeletal: Negative for arthralgias, back pain, joint swelling and myalgias.   Skin: Negative for color change and rash.   Allergic/Immunologic: Negative for immunocompromised state.   Neurological: Negative for dizziness, seizures, syncope, weakness and headaches.   Hematological: Negative for adenopathy. Does not bruise/bleed easily.   Psychiatric/Behavioral: Negative for agitation, dysphoric mood, sleep disturbance and  suicidal ideas. The patient is not nervous/anxious.          Objective:     Vitals:    10/11/18 1351   BP: 120/80   Pulse: 72   Resp: 18   Temp: 98.3 °F (36.8 °C)   TempSrc: Oral   SpO2: 98%   Weight: 96.1 kg (211 lb 13.8 oz)   Height: 5' (1.524 m)          Physical Exam   Constitutional: She is oriented to person, place, and time. She appears well-developed and well-nourished.   + obesity with Body mass index is 41.38 kg/m².   HENT:   Head: Normocephalic and atraumatic.   Right Ear: External ear normal.   Left Ear: External ear normal.   Nose: Nose normal.   Mouth/Throat: Oropharynx is clear and moist. No oropharyngeal exudate.   Eyes: EOM are normal. Pupils are equal, round, and reactive to light.   Neck: Normal range of motion. Neck supple. No JVD present. No tracheal deviation present. No thyromegaly present.   Cardiovascular: Normal rate, regular rhythm and normal heart sounds.   No murmur heard.  Pulmonary/Chest: Effort normal and breath sounds normal. No stridor. No respiratory distress. She has no wheezes. She has no rales.   Abdominal: Soft. She exhibits no distension. There is no tenderness. There is no guarding.   Musculoskeletal: Normal range of motion. She exhibits no edema.   Lymphadenopathy:     She has no cervical adenopathy.   Neurological: She is alert and oriented to person, place, and time. No cranial nerve deficit. Coordination normal.   Skin: Skin is warm and dry. No rash noted.   Psychiatric: She has a normal mood and affect.         Assessment:         ICD-10-CM ICD-9-CM   1. Annual physical exam Z00.00 V70.0   2. Essential hypertension I10 401.9   3. Hyperlipidemia, unspecified hyperlipidemia type E78.5 272.4   4. Acquired hypothyroidism E03.9 244.9   5. NAFLD (nonalcoholic fatty liver disease) K76.0 571.8   6. IFG (impaired fasting glucose) R73.01 790.21   7. Morbid obesity with BMI of 40.0-44.9, adult E66.01 278.01    Z68.41 V85.41   8. KELLY (obstructive sleep apnea) G47.33 327.23   9. Flu  vaccine need Z23 V04.81       Plan:       Annual physical exam  Health Maintenance Summary     Mammogram Next Due 7/12/2019     Done 7/12/2018 DIS-- Please see media    Done 7/12/2018 [employer]: WORKFLOW - HEALTHY PLANET - EXTERNAL DATA - EXTERNAL PROCEDURES DATE - MAMMOGRAM    Done 4/21/2017 DIS    Done 4/21/2017 [employer]: WORKFLOW - HEALTHY PLANET - EXTERNAL DATA - EXTERNAL PROCEDURES DATE - MAMMOGRAM    Done 3/31/2016 [employer]: WORKFLOW - HEALTHY PLANET - EXTERNAL DATA - EXTERNAL PROCEDURES DATE - MAMMOGRAM    Patient has more history with this topic...   Colonoscopy Next Due 7/29/2021     Done 7/29/2016 COLONOSCOPY   Pap Smear with HPV Cotest Next Due 8/23/2021     Done 8/23/2018 LIQUID-BASED PAP SMEAR, SCREENING    Done 3/2/2017 LIQUID-BASED PAP SMEAR, SCREENING   Lipid Panel Next Due 10/4/2023     Done 10/4/2018 LIPID PANEL    Done 3/9/2018 LIPID PANEL Abnormal     Done 9/8/2017 LIPID PANEL Abnormal     Done 5/5/2017 LIPID PANEL Abnormal     Done 2/7/2017 LIPID PANEL Abnormal     Patient has more history with this topic...   TETANUS VACCINE Next Due 7/26/2026     Done 7/26/2016 Imm Admin: Tdap   Hepatitis C Screening This plan is no longer active.     Done 5/5/2017 HEPATITIS PANEL, ACUTE    Done 7/21/2016 HEPATITIS C ANTIBODY   Influenza Vaccine This plan is no longer active.     Done 10/11/2018 Imm Admin: Influenza - Quadrivalent - PF    Done 10/6/2017 Imm Admin: Influenza - Quadrivalent - PF    Done 10/18/2016 Imm Admin: Influenza - Quadrivalent    Declined 10/6/2015 getting done at work    Done 12/2/2006 Imm Admin: Influenza         Essential hypertension  -  controlled on present medication.  Follow-up in 6 months.    Hyperlipidemia, unspecified hyperlipidemia type  -  controlled on present medication.  Follow-up in 6 months    Acquired hypothyroidism  -  controlled on present medication.  Follow-up in 6 months    NAFLD (nonalcoholic fatty liver disease)  -  weight loss and lifestyle modification    IFG  (impaired fasting glucose)  -  weight loss and lifestyle modifications  -     Comprehensive metabolic panel; Future; Expected date: 04/02/2019  -     Hemoglobin A1c; Future; Expected date: 04/02/2019    Morbid obesity with BMI of 40.0-44.9, adult  - weight loss and lifestyle modification    KELLY (obstructive sleep apnea)  -  continue use of CPAP    Flu vaccine need  -     Influenza - Quadrivalent (3 years & older) (PF)      Follow-up in about 6 months (around 4/11/2019) for fasting labs and office visit.        Medication List           Accurate as of 10/11/18  8:07 PM. If you have any questions, ask your nurse or doctor.               CONTINUE taking these medications    amLODIPine 5 MG tablet  Commonly known as:  NORVASC  TAKE 1 TABLET DAILY     aspirin 81 MG EC tablet  Commonly known as:  ECOTRIN     levothyroxine 112 MCG tablet  Commonly known as:  SYNTHROID  TAKE 1 TABLET BEFORE BREAKFAST     lisinopril-hydrochlorothiazide 20-25 mg Tab  Commonly known as:  PRINZIDE,ZESTORETIC  TAKE 1 TABLET DAILY     rosuvastatin 10 MG tablet  Commonly known as:  CRESTOR  TAKE 1 TABLET DAILY

## 2019-03-09 ENCOUNTER — OFFICE VISIT (OUTPATIENT)
Dept: URGENT CARE | Facility: CLINIC | Age: 59
End: 2019-03-09
Payer: COMMERCIAL

## 2019-03-09 VITALS
HEART RATE: 85 BPM | HEIGHT: 60 IN | SYSTOLIC BLOOD PRESSURE: 119 MMHG | RESPIRATION RATE: 19 BRPM | BODY MASS INDEX: 41.23 KG/M2 | TEMPERATURE: 98 F | OXYGEN SATURATION: 96 % | WEIGHT: 210 LBS | DIASTOLIC BLOOD PRESSURE: 78 MMHG

## 2019-03-09 DIAGNOSIS — J02.9 VIRAL PHARYNGITIS: Primary | ICD-10-CM

## 2019-03-09 LAB
CTP QC/QA: YES
S PYO RRNA THROAT QL PROBE: NEGATIVE

## 2019-03-09 PROCEDURE — 99214 OFFICE O/P EST MOD 30 MIN: CPT | Mod: S$GLB,,, | Performed by: NURSE PRACTITIONER

## 2019-03-09 PROCEDURE — 3008F BODY MASS INDEX DOCD: CPT | Mod: CPTII,S$GLB,, | Performed by: NURSE PRACTITIONER

## 2019-03-09 PROCEDURE — 87880 POCT RAPID STREP A: ICD-10-PCS | Mod: QW,S$GLB,, | Performed by: NURSE PRACTITIONER

## 2019-03-09 PROCEDURE — 3078F DIAST BP <80 MM HG: CPT | Mod: CPTII,S$GLB,, | Performed by: NURSE PRACTITIONER

## 2019-03-09 PROCEDURE — 3078F PR MOST RECENT DIASTOLIC BLOOD PRESSURE < 80 MM HG: ICD-10-PCS | Mod: CPTII,S$GLB,, | Performed by: NURSE PRACTITIONER

## 2019-03-09 PROCEDURE — 3074F SYST BP LT 130 MM HG: CPT | Mod: CPTII,S$GLB,, | Performed by: NURSE PRACTITIONER

## 2019-03-09 PROCEDURE — 99214 PR OFFICE/OUTPT VISIT, EST, LEVL IV, 30-39 MIN: ICD-10-PCS | Mod: S$GLB,,, | Performed by: NURSE PRACTITIONER

## 2019-03-09 PROCEDURE — 3074F PR MOST RECENT SYSTOLIC BLOOD PRESSURE < 130 MM HG: ICD-10-PCS | Mod: CPTII,S$GLB,, | Performed by: NURSE PRACTITIONER

## 2019-03-09 PROCEDURE — 3008F PR BODY MASS INDEX (BMI) DOCUMENTED: ICD-10-PCS | Mod: CPTII,S$GLB,, | Performed by: NURSE PRACTITIONER

## 2019-03-09 PROCEDURE — 87880 STREP A ASSAY W/OPTIC: CPT | Mod: QW,S$GLB,, | Performed by: NURSE PRACTITIONER

## 2019-03-09 RX ORDER — PREDNISONE 20 MG/1
40 TABLET ORAL DAILY
Qty: 10 TABLET | Refills: 0 | Status: SHIPPED | OUTPATIENT
Start: 2019-03-09 | End: 2019-03-14

## 2019-03-09 NOTE — PROGRESS NOTES
Subjective:       Patient ID: Digna Bhatia is a 58 y.o. female.    Vitals:  height is 5' (1.524 m) and weight is 95.3 kg (210 lb). Her oral temperature is 98.3 °F (36.8 °C). Her blood pressure is 119/78 and her pulse is 85. Her respiration is 19 and oxygen saturation is 96%.     Chief Complaint: URI      The patient presents to the clinic today with complaints of worsening sore throat and scratchy throat over the last 24-48 hours.  Claims that she has been using throat last thinners with mild relief.  Denies any fever or chills.  Denies any cough or congestion.  Denies any sinus pressure or postnasal drip.      URI    This is a new problem. The current episode started in the past 7 days (2 days). The problem has been gradually worsening. There has been no fever. Associated symptoms include coughing, a sore throat and swollen glands. Pertinent negatives include no abdominal pain, chest pain, congestion, diarrhea, dysuria, ear pain, headaches, joint pain, joint swelling, nausea, neck pain, plugged ear sensation, rash, rhinorrhea, sinus pain, sneezing, vomiting or wheezing. Treatments tried: Mucinex DM. The treatment provided no relief.       Constitution: Negative for chills, fatigue and fever.   HENT: Positive for sore throat. Negative for ear pain, congestion and sinus pain.    Neck: Negative for neck pain and painful lymph nodes.   Cardiovascular: Negative for chest pain and leg swelling.   Eyes: Negative for double vision and blurred vision.   Respiratory: Positive for cough. Negative for shortness of breath and wheezing.    Gastrointestinal: Negative for abdominal pain, nausea, vomiting and diarrhea.   Genitourinary: Negative for dysuria, frequency, urgency and history of kidney stones.   Musculoskeletal: Negative for joint pain, joint swelling, muscle cramps and muscle ache.   Skin: Negative for color change, pale, rash and bruising.   Allergic/Immunologic: Negative for seasonal allergies and sneezing.    Neurological: Negative for dizziness, history of vertigo, light-headedness, passing out and headaches.   Hematologic/Lymphatic: Negative for swollen lymph nodes.   Psychiatric/Behavioral: Negative for nervous/anxious, sleep disturbance and depression. The patient is not nervous/anxious.        Objective:      Physical Exam   Constitutional: She is oriented to person, place, and time. She appears well-developed and well-nourished. She is cooperative.  Non-toxic appearance. She does not appear ill. No distress.   HENT:   Head: Normocephalic and atraumatic.   Right Ear: Hearing, external ear and ear canal normal. Tympanic membrane is bulging.   Left Ear: Hearing, external ear and ear canal normal. Tympanic membrane is bulging.   Nose: Mucosal edema and rhinorrhea present. No nasal deformity. No epistaxis. Right sinus exhibits no maxillary sinus tenderness and no frontal sinus tenderness. Left sinus exhibits no maxillary sinus tenderness and no frontal sinus tenderness.   Mouth/Throat: Uvula is midline and mucous membranes are normal. No trismus in the jaw. Normal dentition. No uvula swelling. Posterior oropharyngeal erythema (cobblestone apperance with PND) present.   Eyes: Conjunctivae and lids are normal. No scleral icterus.   Sclera clear bilat   Neck: Trachea normal, full passive range of motion without pain and phonation normal. Neck supple.   Cardiovascular: Normal rate, regular rhythm, normal heart sounds, intact distal pulses and normal pulses.   Pulmonary/Chest: Effort normal and breath sounds normal. No respiratory distress.   Abdominal: Soft. Normal appearance and bowel sounds are normal. She exhibits no distension. There is no tenderness.   Musculoskeletal: Normal range of motion. She exhibits no edema or deformity.   Neurological: She is alert and oriented to person, place, and time. She exhibits normal muscle tone. Coordination normal.   Skin: Skin is warm, dry and intact. She is not diaphoretic. No  pallor.   Psychiatric: She has a normal mood and affect. Her speech is normal and behavior is normal. Judgment and thought content normal. Cognition and memory are normal.   Nursing note and vitals reviewed.      Assessment:       1. Viral pharyngitis        Plan:         Viral pharyngitis  -     POCT rapid strep A  -     (Magic mouthwash) 1:1:1 Benadryl 12.5mg/5ml liq, aluminum & magnesium hydroxide-simehticone (Maalox), lidocaine viscous 2%; Swish and spit 10 mLs every 4 (four) hours as needed. Sore throat  Dispense: 120 mL; Refill: 0  -     predniSONE (DELTASONE) 20 MG tablet; Take 2 tablets (40 mg total) by mouth once daily. for 5 days  Dispense: 10 tablet; Refill: 0      Patient Instructions       When You Have a Sore Throat    A sore throat can be painful. There are many reasons why you may have a sore throat. Your healthcare provider will work with you to find the cause of your sore throat. He or she will also find the best treatment for you.  What causes a sore throat?  Sore throats can be caused or worsened by:  · Cold or flu viruses  · Bacteria  · Irritants such as tobacco smoke or air pollution  · Acid reflux  A healthy throat  The tonsils are on the sides of the throat near the base of the tongue. They collect viruses and bacteria and help fight infection. The throat (pharynx) is the passage for air. Mucus from the nasal cavity also moves down the passage.  An inflamed throat  The tonsils and pharynx can become inflamed due to a cold or flu virus. Postnasal drip (excess mucus draining from the nasal cavity) can irritate the throat. It can also make the throat or tonsils more likely to be infected by bacteria. Severe, untreated tonsillitis in children or adults can cause a pocket of pus (abscess) to form near the tonsil.  Your evaluation  A medical evaluation can help find the cause of your sore throat. It can also help your healthcare provider choose the best treatment for you. The evaluation may include  a health history, physical exam, and diagnostic tests.  Health history  Your healthcare provider may ask you the following:  · How long has the sore throat lasted and how have you been treating it?  · Do you have any other symptoms, such as body aches, fever, or cough?  · Does your sore throat recur? If so, how often? How many days of school or work have you missed because of a sore throat?  · Do you have trouble eating or swallowing?  · Have you been told that you snore or have other sleep problems?  · Do you have bad breath?  · Do you cough up bad-tasting mucus?  Physical exam  During the exam, your healthcare provider checks your ears, nose, and throat for problems. He or she also checks for swelling in the neck, and may listen to your chest.  Possible tests  Other tests your healthcare provider may perform include:  · A throat swab to check for bacteria such as streptococcus (the bacteria that causes strep throat)  · A blood test to check for mononucleosis (a viral infection)  · A chest X-ray to rule out pneumonia, especially if you have a cough  Treating a sore throat  Treatment depends on many factors. What is the likely cause? Is the problem recent? Does it keep coming back? In many cases, the best thing to do is to treat the symptoms, rest, and let the problem heal itself. Antibiotics may help clear up some bacterial infections. For cases of severe or recurring tonsillitis, the tonsils may need to be removed.  Relieving your symptoms  · Dont smoke, and avoid secondhand smoke.  · For children, try throat sprays or Popsicles. Adults and older children may try lozenges.  · Drink warm liquids to soothe the throat and help thin mucus. Avoid alcohol, spicy foods, and acidic drinks such as orange juice. These can irritate the throat.  · Gargle with warm saltwater (1 teaspoon of salt to 8 ounces of warm water).  · Use a humidifier to keep air moist and relieve throat dryness.  · Try over-the-counter pain relievers  "such as acetaminophen or ibuprofen. Use as directed, and dont exceed the recommended dose. Dont give aspirin to children.   Are antibiotics needed?  If your sore throat is due to a bacterial infection, antibiotics may speed healing and prevent complications. Although group A streptococcus ("strep throat" or GAS) is the major treatable infection for a sore throat, GAS causes only 5% to 15% of sore throats in adults who seek medical care. Most sore throats are caused by cold or flu viruses. And antibiotics dont treat viral illness. In fact, using antibiotics when theyre not needed may produce bacteria that are harder to kill. Your healthcare provider will prescribe antibiotics only if he or she thinks they are likely to help.  If antibiotics are prescribed  Take the medicine exactly as directed. Be sure to finish your prescription even if youre feeling better. And be sure to ask your healthcare provider or pharmacist what side effects are common and what to do about them.  Is surgery needed?  In some cases, tonsils need to be removed. This is often done as outpatient (same-day) surgery. Your healthcare provider may advise removing the tonsils in cases of:  · Several severe bouts of tonsillitis in a year. Severe episodes include those that lead to missed days of school or work, or that need to be treated with antibiotics.  · Tonsillitis that causes breathing problems during sleep  · Tonsillitis caused by food particles collecting in pouches in the tonsils (cryptic tonsillitis)  Call your healthcare provider if any of the following occur:  · Symptoms worsen, or new symptoms develop.  · Swollen tonsils make breathing difficult.  · The pain is severe enough to keep you from drinking liquids.  · A skin rash, hives, or wheezing develops. Any of these could signal an allergic reaction to antibiotics.  · Symptoms dont improve within a week.  · Symptoms dont improve within 2 to 3 days of starting antibiotics.   Date " Last Reviewed: 10/1/2016  © 6805-2312 [employer]. [address] [place] [address], [place] [address]. All rights reserved. This information is not intended as a substitute for professional medical care. Always follow your healthcare professional's instructions.        Self-Care for Sore Throats    Sore throats happen for many reasons, such as colds, allergies, and infections caused by viruses or bacteria. In any case, your throat becomes red and sore. Your goal for self-care is to reduce your discomfort while giving your throat a chance to heal.  Moisten and soothe your throat  Tips include the following:  · Try a sip of water first thing after waking up.  · Keep your throat moist by drinking 6 or more glasses of clear liquids every day.  · Run a cool-air humidifier in your room overnight.  · Avoid cigarette smoke.   · Suck on throat lozenges, cough drops, hard candy, ice chips, or frozen fruit-juice bars. Use the sugar-free versions if your diet or medical condition requires them.  Gargle to ease irritation  Gargling every hour or 2 can ease irritation. Try gargling with 1 of these solutions:  · 1/4 teaspoon of salt in 1/2 cup of warm water  · An over-the-counter anesthetic gargle  Use medicine for more relief  Over-the-counter medicine can reduce sore throat symptoms. Ask your pharmacist if you have questions about which medicine to use:  · Ease pain with anesthetic sprays. Aspirin or an aspirin substitute also helps. Remember, never give aspirin to anyone 18 or younger, or if you are already taking blood thinners.   · For sore throats caused by allergies, try antihistamines to block the allergic reaction.  · Remember: unless a sore throat is caused by a bacterial infection, antibiotics wont help you.  Prevent future sore throats  Prevention tips include the following:  · Stop smoking or reduce contact with secondhand smoke. Smoke irritates the tender throat lining.  · Limit contact with pets and with  allergy-causing substances, such as pollen and mold.  · When youre around someone with a sore throat or cold, wash your hands often to keep viruses or bacteria from spreading.  · Dont strain your vocal cords.  Call your healthcare provider  Contact your healthcare provider if you have:  · A temperature over 101°F (38.3°C)  · White spots on the throat  · Great difficulty swallowing  · Trouble breathing  · A skin rash  · Recent exposure to someone else with strep bacteria  · Severe hoarseness and swollen glands in the neck or jaw   Date Last Reviewed: 8/1/2016  © 5705-4570 [employer]. [address] [place] [address]. All rights reserved. This information is not intended as a substitute for professional medical care. Always follow your healthcare professional's instructions.      -Warm salt water rinses.  -Magic mouthwash as needed for sore throat.  -5 days of steroids.  -Warm tea with honey.  Please follow up with your Primary care provider within 2-5 days if your signs and symptoms have not resolved or worsen.     If your condition worsens or fails to improve we recommend that you receive another evaluation at the emergency room immediately or contact your primary medical clinic to discuss your concerns.   You must understand that you have received an Urgent Care treatment only and that you may be released before all of your medical problems are known or treated. You, the patient, will arrange for follow up care as instructed.

## 2019-03-10 NOTE — PATIENT INSTRUCTIONS

## 2019-04-05 ENCOUNTER — OFFICE VISIT (OUTPATIENT)
Dept: FAMILY MEDICINE | Facility: CLINIC | Age: 59
End: 2019-04-05
Payer: COMMERCIAL

## 2019-04-05 VITALS
DIASTOLIC BLOOD PRESSURE: 70 MMHG | WEIGHT: 215.81 LBS | OXYGEN SATURATION: 99 % | SYSTOLIC BLOOD PRESSURE: 118 MMHG | BODY MASS INDEX: 42.37 KG/M2 | HEART RATE: 89 BPM | HEIGHT: 60 IN | TEMPERATURE: 98 F

## 2019-04-05 DIAGNOSIS — E78.5 HYPERLIPIDEMIA, UNSPECIFIED HYPERLIPIDEMIA TYPE: ICD-10-CM

## 2019-04-05 DIAGNOSIS — E03.9 ACQUIRED HYPOTHYROIDISM: ICD-10-CM

## 2019-04-05 DIAGNOSIS — K76.0 NAFLD (NONALCOHOLIC FATTY LIVER DISEASE): ICD-10-CM

## 2019-04-05 DIAGNOSIS — G47.33 OSA (OBSTRUCTIVE SLEEP APNEA): ICD-10-CM

## 2019-04-05 DIAGNOSIS — R73.03 PREDIABETES: ICD-10-CM

## 2019-04-05 DIAGNOSIS — E66.01 MORBID OBESITY WITH BMI OF 40.0-44.9, ADULT: ICD-10-CM

## 2019-04-05 DIAGNOSIS — I10 ESSENTIAL HYPERTENSION: Primary | ICD-10-CM

## 2019-04-05 PROCEDURE — 99214 OFFICE O/P EST MOD 30 MIN: CPT | Mod: S$GLB,,, | Performed by: NURSE PRACTITIONER

## 2019-04-05 PROCEDURE — 3078F DIAST BP <80 MM HG: CPT | Mod: CPTII,S$GLB,, | Performed by: NURSE PRACTITIONER

## 2019-04-05 PROCEDURE — 99999 PR PBB SHADOW E&M-EST. PATIENT-LVL IV: CPT | Mod: PBBFAC,,, | Performed by: NURSE PRACTITIONER

## 2019-04-05 PROCEDURE — 3008F PR BODY MASS INDEX (BMI) DOCUMENTED: ICD-10-PCS | Mod: CPTII,S$GLB,, | Performed by: NURSE PRACTITIONER

## 2019-04-05 PROCEDURE — 3074F PR MOST RECENT SYSTOLIC BLOOD PRESSURE < 130 MM HG: ICD-10-PCS | Mod: CPTII,S$GLB,, | Performed by: NURSE PRACTITIONER

## 2019-04-05 PROCEDURE — 99999 PR PBB SHADOW E&M-EST. PATIENT-LVL IV: ICD-10-PCS | Mod: PBBFAC,,, | Performed by: NURSE PRACTITIONER

## 2019-04-05 PROCEDURE — 3008F BODY MASS INDEX DOCD: CPT | Mod: CPTII,S$GLB,, | Performed by: NURSE PRACTITIONER

## 2019-04-05 PROCEDURE — 3078F PR MOST RECENT DIASTOLIC BLOOD PRESSURE < 80 MM HG: ICD-10-PCS | Mod: CPTII,S$GLB,, | Performed by: NURSE PRACTITIONER

## 2019-04-05 PROCEDURE — 3074F SYST BP LT 130 MM HG: CPT | Mod: CPTII,S$GLB,, | Performed by: NURSE PRACTITIONER

## 2019-04-05 PROCEDURE — 99214 PR OFFICE/OUTPT VISIT, EST, LEVL IV, 30-39 MIN: ICD-10-PCS | Mod: S$GLB,,, | Performed by: NURSE PRACTITIONER

## 2019-04-05 RX ORDER — LISINOPRIL AND HYDROCHLOROTHIAZIDE 20; 25 MG/1; MG/1
1 TABLET ORAL DAILY
Qty: 90 TABLET | Refills: 0 | Status: SHIPPED | OUTPATIENT
Start: 2019-04-05 | End: 2019-05-29 | Stop reason: SDUPTHER

## 2019-04-05 RX ORDER — LEVOTHYROXINE SODIUM 112 UG/1
TABLET ORAL
Qty: 90 TABLET | Refills: 0 | Status: SHIPPED | OUTPATIENT
Start: 2019-04-05 | End: 2019-05-29 | Stop reason: SDUPTHER

## 2019-04-05 RX ORDER — ROSUVASTATIN CALCIUM 10 MG/1
10 TABLET, COATED ORAL DAILY
Qty: 90 TABLET | Refills: 0 | Status: SHIPPED | OUTPATIENT
Start: 2019-04-05 | End: 2019-05-29 | Stop reason: SDUPTHER

## 2019-04-05 RX ORDER — AMLODIPINE BESYLATE 5 MG/1
5 TABLET ORAL DAILY
Qty: 90 TABLET | Refills: 0 | Status: SHIPPED | OUTPATIENT
Start: 2019-04-05 | End: 2019-05-29 | Stop reason: SDUPTHER

## 2019-04-05 NOTE — PROGRESS NOTES
Subjective:       Patient ID: Digna Bhatia is a 58 y.o. female.    Chief Complaint: Follow-up (6 month follow up )    Patient is a 58-year-old white female with hypertension, hyperlipidemia, hypothyroidism, impaired fasting glucose/prediabetes, fatty liver disease noted on ultrasound May 2017 with elevated liver enzymes, and obstructive sleep apnea with CPAP use that is here today for six month follow up with fasting lab results.     Patient has hypertension that is currently controlled on present medications, amlodipine and lisinopril HCT.  /70   Pulse 89   Temp 98.2 °F (36.8 °C) (Oral)   Ht 5' (1.524 m)   Wt 97.9 kg (215 lb 13.3 oz)   LMP  (LMP Unknown)   SpO2 99%   BMI 42.15 kg/m²      Patient has hypothyroidism that is controlled on present medication, Synthroid 112 µg daily based on labs in October 2018.  Will recheck with next blood draw.     Patient has Prediabetes.  Her fasting blood glucose is now 148.  Her hemoglobin A1c is 6.4%. Patient admits that she has done nothing by way of lifestyle modifications to improve levels.  Advised patient that based on FBG she is Diabetic but her HgbA1C has 0.1% left before she is officially diabetic.  Advised patient that once she is diabetic - she has that diagnoses for minimum of 5 years even if not put on medication - she NEEDS TO LOSE WEIGHT AND ADJUST DIET - agreed to 1 more time give her time for adjustments with diet and exercise and will recheck in 3 months.    Patient has hyperlipidemia controlled on Crestor 10 mg daily.  LDL 70.4 when check in October 2018.  Will recheck with next blood draw.      Patient has always had an elevated total bilirubin in the past but her other liver enzymes started to climb in May 2017.  Patient was sent to GI for evaluation.  She has had a negative hepatitis panel and a liver ultrasound in May 2017 showed fatty liver disease.  Patient's bilirubin did improve from 1.8 to 1.4 and her other liver enzymes have returned  to normal range..  Advised patient to continue lifestyle modifications.     Patient reports she has obstructive sleep apnea and she has followed up with the specialists and has now been using her CPAP machine.           Component      Latest Ref Rng & Units 4/3/2019 10/4/2018 3/9/2018   Sodium      136 - 145 mmol/L 141 138 139   Potassium      3.5 - 5.1 mmol/L 4.6 4.3 4.5   Chloride      95 - 110 mmol/L 104 102 101   CO2      23 - 29 mmol/L 29 28 30 (H)   Glucose      70 - 110 mg/dL 148 (H) 135 (H) 116 (H)   BUN, Bld      7 - 17 mg/dL 20 (H) 20 (H) 17   Creatinine      0.50 - 1.40 mg/dL 0.78 0.77 0.82   Calcium      8.7 - 10.5 mg/dL 9.7 9.4 9.6   Total Protein      6.0 - 8.4 g/dL 6.9 7.1 7.3   Albumin      3.5 - 5.2 g/dL 4.0 4.2 4.3   Total Bilirubin      0.1 - 1.0 mg/dL 1.4 (H) 1.2 (H) 1.6 (H)   Alkaline Phosphatase      38 - 126 U/L 76 79 80   AST      15 - 46 U/L 24 22 25   ALT      10 - 44 U/L 30 24 40   Anion Gap      8 - 16 mmol/L 8 8 8   eGFR if African American      >60 mL/min/1.73 m:2 >60.0 >60.0 >60.0   eGFR if non African American      >60 mL/min/1.73 m:2 >60.0 >60.0 >60.0   Cholesterol      120 - 199 mg/dL  135 137   Triglycerides      30 - 150 mg/dL  73 94   HDL      40 - 75 mg/dL  50 59   LDL Cholesterol      63.0 - 159.0 mg/dL  70.4 59.2 (L)   HDL/Chol Ratio      20.0 - 50.0 %  37.0 43.1   Total Cholesterol/HDL Ratio      2.0 - 5.0  2.7 2.3   Non-HDL Cholesterol      mg/dL  85 78   Hemoglobin A1C External      4.0 - 5.6 % 6.4 (H) 5.8 (H) 5.7 (H)   Estimated Avg Glucose      68 - 131 mg/dL 137 (H) 120 117   TSH      0.400 - 4.000 uIU/mL  1.430 1.810       Current Outpatient Medications   Medication Sig Dispense Refill    amLODIPine (NORVASC) 5 MG tablet TAKE 1 TABLET DAILY 90 tablet 1    aspirin (ECOTRIN) 81 MG EC tablet Take 81 mg by mouth once daily.      levothyroxine (SYNTHROID) 112 MCG tablet TAKE 1 TABLET BEFORE BREAKFAST 90 tablet 1    lisinopril-hydrochlorothiazide (PRINZIDE,ZESTORETIC)  20-25 mg Tab TAKE 1 TABLET DAILY 90 tablet 1    rosuvastatin (CRESTOR) 10 MG tablet TAKE 1 TABLET DAILY 90 tablet 1     No current facility-administered medications for this visit.        Past Medical History:   Diagnosis Date    H/O mammogram 3/31/2016    has done at [facility]    Hyperlipidemia 2017    Hypertension     Hyperthyroidism     Hypothyroidism     IFG (impaired fasting glucose)     NAFLD (nonalcoholic fatty liver disease)     KELLY (obstructive sleep apnea)     on CPAP    Screening for colorectal cancer 2016       Past Surgical History:   Procedure Laterality Date     SECTION      COLONOSCOPY-Miralax split prep  N/A 2016    [facility]    DILATION AND CURETTAGE OF UTERUS         Family History   Problem Relation Age of Onset    Dementia Mother     Parkinsonism Mother         PASSED AGE 78    Hypertension Father     Cancer Father         KIDNEY PASSED AGE 70    Heart disease Father 50        HEART ATTACK    Diabetes Father     Hypertension Sister     Diabetes Brother     Hypertension Brother     Diabetes Sister     Hypertension Sister     Hypertension Sister     Hypertension Brother     No Known Problems Daughter     No Known Problems Son     No Known Problems Son        Social History     Socioeconomic History    Marital status:      Spouse name: Not on file    Number of children: Not on file    Years of education: Not on file    Highest education level: Not on file   Occupational History    Not on file   Social Needs    Financial resource strain: Not on file    Food insecurity:     Worry: Not on file     Inability: Not on file    Transportation needs:     Medical: Not on file     Non-medical: Not on file   Tobacco Use    Smoking status: Never Smoker    Smokeless tobacco: Never Used   Substance and Sexual Activity    Alcohol use: Yes     Comment: rare    Drug use: No    Sexual activity: Not Currently    Lifestyle    Physical activity:     Days per week: Not on file     Minutes per session: Not on file    Stress: Not on file   Relationships    Social connections:     Talks on phone: Not on file     Gets together: Not on file     Attends Church service: Not on file     Active member of club or organization: Not on file     Attends meetings of clubs or organizations: Not on file     Relationship status: Not on file   Other Topics Concern    Not on file   Social History Narrative    Not on file       Review of Systems   Constitutional: Negative for activity change and unexpected weight change.   HENT: Negative for hearing loss, rhinorrhea and trouble swallowing.    Eyes: Negative for discharge and visual disturbance.   Respiratory: Negative for chest tightness and wheezing.    Cardiovascular: Negative for chest pain and palpitations.   Gastrointestinal: Negative for blood in stool, constipation, diarrhea and vomiting.   Endocrine: Negative for polydipsia and polyuria.   Genitourinary: Negative for difficulty urinating, dysuria, hematuria and menstrual problem.   Musculoskeletal: Negative for arthralgias, joint swelling and neck pain.   Neurological: Negative for weakness and headaches.   Psychiatric/Behavioral: Negative for confusion and dysphoric mood.         Objective:     Vitals:    04/05/19 1316   BP: 118/70   Pulse: 89   Temp: 98.2 °F (36.8 °C)   TempSrc: Oral   SpO2: 99%   Weight: 97.9 kg (215 lb 13.3 oz)   Height: 5' (1.524 m)          Physical Exam   Constitutional: She is oriented to person, place, and time. She appears well-developed and well-nourished.   + obesity with Body mass index is 42.15 kg/m².   HENT:   Head: Normocephalic and atraumatic.   Right Ear: External ear normal.   Left Ear: External ear normal.   Nose: Nose normal.   Mouth/Throat: Oropharynx is clear and moist. No oropharyngeal exudate.   Eyes: Pupils are equal, round, and reactive to light. EOM are normal.   Neck: Normal range of  motion. Neck supple. No JVD present. No tracheal deviation present. No thyromegaly present.   Cardiovascular: Normal rate, regular rhythm and normal heart sounds.   No murmur heard.  Pulmonary/Chest: Effort normal and breath sounds normal. No stridor. No respiratory distress. She has no wheezes. She has no rales.   Abdominal: Soft. She exhibits no distension. There is no tenderness. There is no guarding.   Musculoskeletal: Normal range of motion. She exhibits no edema.   Lymphadenopathy:     She has no cervical adenopathy.   Neurological: She is alert and oriented to person, place, and time. No cranial nerve deficit. Coordination normal.   Skin: Skin is warm and dry. No rash noted.   Psychiatric: She has a normal mood and affect.         Assessment:         ICD-10-CM ICD-9-CM   1. Essential hypertension I10 401.9   2. Hyperlipidemia, unspecified hyperlipidemia type E78.5 272.4   3. Acquired hypothyroidism E03.9 244.9   4. NAFLD (nonalcoholic fatty liver disease) K76.0 571.8   5. Prediabetes R73.03 790.29   6. Morbid obesity with BMI of 40.0-44.9, adult E66.01 278.01    Z68.41 V85.41   7. KELLY (obstructive sleep apnea) G47.33 327.23       Plan:       Essential hypertension  -  Controlled on Lisinopril HCT and Amlodipine at present doses.  Recheck in 3 months.  -     amLODIPine (NORVASC) 5 MG tablet; Take 1 tablet (5 mg total) by mouth once daily.  Dispense: 90 tablet; Refill: 0  -     lisinopril-hydrochlorothiazide (PRINZIDE,ZESTORETIC) 20-25 mg Tab; Take 1 tablet by mouth once daily.  Dispense: 90 tablet; Refill: 0    Hyperlipidemia, unspecified hyperlipidemia type  -  Continue rosuvastatin and recheck in 3 months.  -     Lipid panel; Future; Expected date: 04/05/2019  -     rosuvastatin (CRESTOR) 10 MG tablet; Take 1 tablet (10 mg total) by mouth once daily.  Dispense: 90 tablet; Refill: 0    Acquired hypothyroidism  -  Continue levothyroxine at present dose and recheck in 3 months.  -     TSH; Future; Expected  date: 04/05/2019  -     T4, free; Future; Expected date: 04/05/2019  -     levothyroxine (SYNTHROID) 112 MCG tablet; TAKE 1 TABLET BEFORE BREAKFAST  Dispense: 90 tablet; Refill: 0    NAFLD (nonalcoholic fatty liver disease)    Prediabetes  -  MUST STRICTER LIFESTYLE MODIFICATIONS WITH WEIGHT LOSS AND EXERCISE AND RECHECK IN 3 MONTHS.  -     Comprehensive metabolic panel; Future; Expected date: 04/05/2019  -     Hemoglobin A1c; Future; Expected date: 04/05/2019    Morbid obesity with BMI of 40.0-44.9, adult  -  WORK ON WEIGHT LOSS    KELLY (obstructive sleep apnea)  -  Continue use of CPAP        Follow up in about 3 months (around 7/5/2019) for fasting labs and follow up visit.     Patient's Medications   New Prescriptions    No medications on file   Previous Medications    ASPIRIN (ECOTRIN) 81 MG EC TABLET    Take 81 mg by mouth once daily.   Modified Medications    Modified Medication Previous Medication    AMLODIPINE (NORVASC) 5 MG TABLET amLODIPine (NORVASC) 5 MG tablet       Take 1 tablet (5 mg total) by mouth once daily.    TAKE 1 TABLET DAILY    LEVOTHYROXINE (SYNTHROID) 112 MCG TABLET levothyroxine (SYNTHROID) 112 MCG tablet       TAKE 1 TABLET BEFORE BREAKFAST    TAKE 1 TABLET BEFORE BREAKFAST    LISINOPRIL-HYDROCHLOROTHIAZIDE (PRINZIDE,ZESTORETIC) 20-25 MG TAB lisinopril-hydrochlorothiazide (PRINZIDE,ZESTORETIC) 20-25 mg Tab       Take 1 tablet by mouth once daily.    TAKE 1 TABLET DAILY    ROSUVASTATIN (CRESTOR) 10 MG TABLET rosuvastatin (CRESTOR) 10 MG tablet       Take 1 tablet (10 mg total) by mouth once daily.    TAKE 1 TABLET DAILY   Discontinued Medications    No medications on file

## 2019-05-28 ENCOUNTER — PATIENT MESSAGE (OUTPATIENT)
Dept: FAMILY MEDICINE | Facility: CLINIC | Age: 59
End: 2019-05-28

## 2019-05-28 DIAGNOSIS — E78.5 HYPERLIPIDEMIA, UNSPECIFIED HYPERLIPIDEMIA TYPE: ICD-10-CM

## 2019-05-28 DIAGNOSIS — E03.9 ACQUIRED HYPOTHYROIDISM: ICD-10-CM

## 2019-05-28 DIAGNOSIS — I10 ESSENTIAL HYPERTENSION: ICD-10-CM

## 2019-05-29 ENCOUNTER — PATIENT MESSAGE (OUTPATIENT)
Dept: FAMILY MEDICINE | Facility: CLINIC | Age: 59
End: 2019-05-29

## 2019-05-29 RX ORDER — ROSUVASTATIN CALCIUM 10 MG/1
10 TABLET, COATED ORAL DAILY
Qty: 90 TABLET | Refills: 0 | Status: SHIPPED | OUTPATIENT
Start: 2019-05-29 | End: 2019-09-26 | Stop reason: SDUPTHER

## 2019-05-29 RX ORDER — AMLODIPINE BESYLATE 5 MG/1
5 TABLET ORAL DAILY
Qty: 90 TABLET | Refills: 0 | Status: SHIPPED | OUTPATIENT
Start: 2019-05-29 | End: 2019-09-26 | Stop reason: SDUPTHER

## 2019-05-29 RX ORDER — LISINOPRIL AND HYDROCHLOROTHIAZIDE 20; 25 MG/1; MG/1
1 TABLET ORAL DAILY
Qty: 90 TABLET | Refills: 0 | Status: SHIPPED | OUTPATIENT
Start: 2019-05-29 | End: 2019-09-26 | Stop reason: SDUPTHER

## 2019-05-29 RX ORDER — LEVOTHYROXINE SODIUM 112 UG/1
TABLET ORAL
Qty: 90 TABLET | Refills: 0 | Status: SHIPPED | OUTPATIENT
Start: 2019-05-29 | End: 2019-09-26 | Stop reason: SDUPTHER

## 2019-05-30 RX ORDER — LEVOTHYROXINE SODIUM 112 UG/1
TABLET ORAL
Qty: 90 TABLET | Refills: 1 | OUTPATIENT
Start: 2019-05-30

## 2019-05-31 ENCOUNTER — PATIENT MESSAGE (OUTPATIENT)
Dept: FAMILY MEDICINE | Facility: CLINIC | Age: 59
End: 2019-05-31

## 2019-06-03 ENCOUNTER — TELEPHONE (OUTPATIENT)
Dept: FAMILY MEDICINE | Facility: CLINIC | Age: 59
End: 2019-06-03

## 2019-06-03 NOTE — TELEPHONE ENCOUNTER
Called in verbal order of 10 day supply of amlodipine, levothyroxine, lisinopril-hydrochlorothiazide, and rosuvastatin to [facility] pharmacy. Pharmacy accepted orders.

## 2019-06-03 NOTE — TELEPHONE ENCOUNTER
STAFF - please call [facility] and give a verbal order for 10 day supply of all her medications as her mail-order will not be delivered in time.  [facility] sure [facility] so you will have to contact patient to find out.  She does use portal if you can not reach her by phone.

## 2019-06-12 ENCOUNTER — PATIENT MESSAGE (OUTPATIENT)
Dept: FAMILY MEDICINE | Facility: CLINIC | Age: 59
End: 2019-06-12

## 2019-07-15 ENCOUNTER — TELEPHONE (OUTPATIENT)
Dept: FAMILY MEDICINE | Facility: CLINIC | Age: 59
End: 2019-07-15

## 2019-07-15 NOTE — TELEPHONE ENCOUNTER
----- Message from Arminda Pabon sent at 7/15/2019  2:33 PM CDT -----  Contact: Self  She is needing to get new orders in the system for blood work. Please advise once this has been done so she can set that up and her appt with you again.

## 2019-08-05 ENCOUNTER — PATIENT MESSAGE (OUTPATIENT)
Dept: FAMILY MEDICINE | Facility: CLINIC | Age: 59
End: 2019-08-05

## 2019-08-05 DIAGNOSIS — Z12.39 SCREENING FOR BREAST CANCER: Primary | ICD-10-CM

## 2019-08-09 ENCOUNTER — OFFICE VISIT (OUTPATIENT)
Dept: FAMILY MEDICINE | Facility: CLINIC | Age: 59
End: 2019-08-09
Payer: COMMERCIAL

## 2019-08-09 VITALS
TEMPERATURE: 98 F | OXYGEN SATURATION: 98 % | HEART RATE: 89 BPM | WEIGHT: 210.75 LBS | BODY MASS INDEX: 41.37 KG/M2 | SYSTOLIC BLOOD PRESSURE: 114 MMHG | DIASTOLIC BLOOD PRESSURE: 72 MMHG | HEIGHT: 60 IN

## 2019-08-09 DIAGNOSIS — E03.9 ACQUIRED HYPOTHYROIDISM: ICD-10-CM

## 2019-08-09 DIAGNOSIS — Z23 NEED FOR STREPTOCOCCUS PNEUMONIAE VACCINATION: ICD-10-CM

## 2019-08-09 DIAGNOSIS — E78.5 HYPERLIPIDEMIA ASSOCIATED WITH TYPE 2 DIABETES MELLITUS: ICD-10-CM

## 2019-08-09 DIAGNOSIS — I10 ESSENTIAL HYPERTENSION: ICD-10-CM

## 2019-08-09 DIAGNOSIS — E11.69 HYPERLIPIDEMIA ASSOCIATED WITH TYPE 2 DIABETES MELLITUS: ICD-10-CM

## 2019-08-09 DIAGNOSIS — E11.9 TYPE 2 DIABETES MELLITUS WITHOUT COMPLICATION, WITHOUT LONG-TERM CURRENT USE OF INSULIN: Primary | ICD-10-CM

## 2019-08-09 DIAGNOSIS — K76.0 NAFLD (NONALCOHOLIC FATTY LIVER DISEASE): ICD-10-CM

## 2019-08-09 DIAGNOSIS — G47.33 OSA ON CPAP: ICD-10-CM

## 2019-08-09 PROCEDURE — 99214 PR OFFICE/OUTPT VISIT, EST, LEVL IV, 30-39 MIN: ICD-10-PCS | Mod: 25,S$GLB,, | Performed by: NURSE PRACTITIONER

## 2019-08-09 PROCEDURE — 90732 PPSV23 VACC 2 YRS+ SUBQ/IM: CPT | Mod: S$GLB,,, | Performed by: NURSE PRACTITIONER

## 2019-08-09 PROCEDURE — 99999 PR PBB SHADOW E&M-EST. PATIENT-LVL IV: ICD-10-PCS | Mod: PBBFAC,,, | Performed by: NURSE PRACTITIONER

## 2019-08-09 PROCEDURE — 90471 IMMUNIZATION ADMIN: CPT | Mod: S$GLB,,, | Performed by: NURSE PRACTITIONER

## 2019-08-09 PROCEDURE — 3074F PR MOST RECENT SYSTOLIC BLOOD PRESSURE < 130 MM HG: ICD-10-PCS | Mod: CPTII,S$GLB,, | Performed by: NURSE PRACTITIONER

## 2019-08-09 PROCEDURE — 3078F DIAST BP <80 MM HG: CPT | Mod: CPTII,S$GLB,, | Performed by: NURSE PRACTITIONER

## 2019-08-09 PROCEDURE — 3008F BODY MASS INDEX DOCD: CPT | Mod: CPTII,S$GLB,, | Performed by: NURSE PRACTITIONER

## 2019-08-09 PROCEDURE — 99214 OFFICE O/P EST MOD 30 MIN: CPT | Mod: 25,S$GLB,, | Performed by: NURSE PRACTITIONER

## 2019-08-09 PROCEDURE — 99999 PR PBB SHADOW E&M-EST. PATIENT-LVL IV: CPT | Mod: PBBFAC,,, | Performed by: NURSE PRACTITIONER

## 2019-08-09 PROCEDURE — 90732 PNEUMOCOCCAL POLYSACCHARIDE VACCINE 23-VALENT =>2YO SQ IM: ICD-10-PCS | Mod: S$GLB,,, | Performed by: NURSE PRACTITIONER

## 2019-08-09 PROCEDURE — 3044F PR MOST RECENT HEMOGLOBIN A1C LEVEL <7.0%: ICD-10-PCS | Mod: CPTII,S$GLB,, | Performed by: NURSE PRACTITIONER

## 2019-08-09 PROCEDURE — 3074F SYST BP LT 130 MM HG: CPT | Mod: CPTII,S$GLB,, | Performed by: NURSE PRACTITIONER

## 2019-08-09 PROCEDURE — 90471 PNEUMOCOCCAL POLYSACCHARIDE VACCINE 23-VALENT =>2YO SQ IM: ICD-10-PCS | Mod: S$GLB,,, | Performed by: NURSE PRACTITIONER

## 2019-08-09 PROCEDURE — 3008F PR BODY MASS INDEX (BMI) DOCUMENTED: ICD-10-PCS | Mod: CPTII,S$GLB,, | Performed by: NURSE PRACTITIONER

## 2019-08-09 PROCEDURE — 3044F HG A1C LEVEL LT 7.0%: CPT | Mod: CPTII,S$GLB,, | Performed by: NURSE PRACTITIONER

## 2019-08-09 PROCEDURE — 3078F PR MOST RECENT DIASTOLIC BLOOD PRESSURE < 80 MM HG: ICD-10-PCS | Mod: CPTII,S$GLB,, | Performed by: NURSE PRACTITIONER

## 2019-08-09 NOTE — PROGRESS NOTES
Subjective:       Patient ID: Digna Bhatia is a 58 y.o. female.    Chief Complaint: Follow-up (lab results)    Patient is a 58-year-old white female with hypertension, hyperlipidemia, hypothyroidism, impaired fasting glucose/prediabetes, fatty liver disease noted on ultrasound May 2017 with elevated liver enzymes, and obstructive sleep apnea with CPAP use that is here today for three month follow up with fasting lab results.     Patient has hypertension that is currently controlled on present medications, amlodipine and lisinopril HCT.  /72   Pulse 89   Temp 98.3 °F (36.8 °C) (Oral)   Ht 5' (1.524 m)   Wt 95.6 kg (210 lb 12.2 oz)   LMP  (LMP Unknown)   SpO2 98%   BMI 41.16 kg/m²     Patient has hypothyroidism that is controlled on present medication, Synthroid 112 µg daily.  See thyroid labs below.     Patient has TYPE 2 DIABETES controlled with lifestyle modfications.  BASED on IFG, patient has been diabetic since October 2018 BUT BASED ON HgbA1C, her A1C was only 5.8% in October 2018 and did not elevated to 6.4% until April 2019.  Her HgbA1C did go down to 6.0% with current labs BUT her FBG worsens at 151 so we will classify as Type 2 Diabetes and monitor with lifestyle modifications at present time.     Patient has hyperlipidemia controlled on Crestor 10 mg daily.  LDL 81.8.     Patient has always had an elevated total bilirubin in the past but her other liver enzymes started to climb in May 2017.  Patient was sent to GI for evaluation.  She has had a negative hepatitis panel and a liver ultrasound in May 2017 showed fatty liver disease.  Patient's  liver enzymes have returned to normal range..  Advised patient to continue lifestyle modifications.     Patient reports she has obstructive sleep apnea and she has followed up with the specialists and has now been using her CPAP machine.       Component      Latest Ref Rng & Units 8/6/2019 4/3/2019 10/4/2018 3/9/2018   Sodium      136 - 145 mmol/L 139  141 138 139   Potassium      3.5 - 5.1 mmol/L 4.5 4.6 4.3 4.5   Chloride      95 - 110 mmol/L 104 104 102 101   CO2      23 - 29 mmol/L 27 29 28 30 (H)   Glucose      70 - 110 mg/dL 151 (H) 148 (H) 135 (H) 116 (H)   BUN, Bld      7 - 17 mg/dL 19 (H) 20 (H) 20 (H) 17   Creatinine      0.50 - 1.40 mg/dL 0.77 0.78 0.77 0.82   Calcium      8.7 - 10.5 mg/dL 9.4 9.7 9.4 9.6   PROTEIN TOTAL      6.0 - 8.4 g/dL 7.4 6.9 7.1 7.3   Albumin      3.5 - 5.2 g/dL 4.5 4.0 4.2 4.3   BILIRUBIN TOTAL      0.1 - 1.0 mg/dL 1.8 (H) 1.4 (H) 1.2 (H) 1.6 (H)   Alkaline Phosphatase      38 - 126 U/L 76 76 79 80   AST      15 - 46 U/L 24 24 22 25   ALT      10 - 44 U/L 28 30 24 40   Anion Gap      8 - 16 mmol/L 8 8 8 8   eGFR if African American      >60 mL/min/1.73 m:2 >60.0 >60.0 >60.0 >60.0   eGFR if non African American      >60 mL/min/1.73 m:2 >60.0 >60.0 >60.0 >60.0   Cholesterol      120 - 199 mg/dL 151  135 137   Triglycerides      30 - 150 mg/dL 71  73 94   HDL      40 - 75 mg/dL 55  50 59   LDL Cholesterol External      63.0 - 159.0 mg/dL 81.8  70.4 59.2 (L)   Hdl/Cholesterol Ratio      20.0 - 50.0 % 36.4  37.0 43.1   Total Cholesterol/HDL Ratio      2.0 - 5.0 2.7  2.7 2.3   Non-HDL Cholesterol      mg/dL 96  85 78   Hemoglobin A1C External      4.0 - 5.6 % 6.0 (H) 6.4 (H) 5.8 (H) 5.7 (H)   Estimated Avg Glucose      68 - 131 mg/dL 126 137 (H) 120 117   TSH      0.400 - 4.000 uIU/mL 3.160  1.430 1.810   Free T4      0.71 - 1.51 ng/dL 0.97  1.10 1.08     Current Outpatient Medications   Medication Sig Dispense Refill    amLODIPine (NORVASC) 5 MG tablet Take 1 tablet (5 mg total) by mouth once daily. 90 tablet 0    aspirin (ECOTRIN) 81 MG EC tablet Take 81 mg by mouth once daily.      levothyroxine (SYNTHROID) 112 MCG tablet TAKE 1 TABLET BEFORE BREAKFAST 90 tablet 0    lisinopril-hydrochlorothiazide (PRINZIDE,ZESTORETIC) 20-25 mg Tab Take 1 tablet by mouth once daily. 90 tablet 0    rosuvastatin (CRESTOR) 10 MG tablet Take 1  tablet (10 mg total) by mouth once daily. 90 tablet 0     No current facility-administered medications for this visit.        Past Medical History:   Diagnosis Date    H/O mammogram 3/31/2016    [facility]    Hyperlipidemia 2017    Hypertension     Hyperthyroidism     Hypothyroidism     IFG (impaired fasting glucose)     NAFLD (nonalcoholic fatty liver disease)     KELLY (obstructive sleep apnea)     on CPAP    Screening for colorectal cancer 2016       Past Surgical History:   Procedure Laterality Date     SECTION      COLONOSCOPY-Miralax split prep  N/A 2016    [facility]    DILATION AND CURETTAGE OF UTERUS         Family History   Problem Relation Age of Onset    Dementia Mother     Parkinsonism Mother         PASSED AGE 78    Hypertension Father     Cancer Father         KIDNEY PASSED AGE 70    Heart disease Father 50        HEART ATTACK    Diabetes Father     Hypertension Sister     Diabetes Brother     Hypertension Brother     Diabetes Sister     Hypertension Sister     Hypertension Sister     Hypertension Brother     No Known Problems Daughter     No Known Problems Son     No Known Problems Son        Social History     Socioeconomic History    Marital status:      Spouse name: Not on file    Number of children: Not on file    Years of education: Not on file    Highest education level: Not on file   Occupational History    Not on file   Social Needs    Financial resource strain: Not on file    Food insecurity:     Worry: Not on file     Inability: Not on file    Transportation needs:     Medical: Not on file     Non-medical: Not on file   Tobacco Use    Smoking status: Never Smoker    Smokeless tobacco: Never Used   Substance and Sexual Activity    Alcohol use: Yes     Comment: rare    Drug use: No    Sexual activity: Not Currently   Lifestyle    Physical activity:     Days per week: Not on  file     Minutes per session: Not on file    Stress: Not on file   Relationships    Social connections:     Talks on phone: Not on file     Gets together: Not on file     Attends Scientologist service: Not on file     Active member of club or organization: Not on file     Attends meetings of clubs or organizations: Not on file     Relationship status: Not on file   Other Topics Concern    Not on file   Social History Narrative    Not on file       Review of Systems   Constitutional: Negative for appetite change, chills, fatigue, fever and unexpected weight change.   HENT: Negative for congestion, ear pain, mouth sores, nosebleeds, postnasal drip, rhinorrhea, sinus pressure, sneezing, sore throat, trouble swallowing and voice change.    Eyes: Negative for photophobia, pain, discharge, redness, itching and visual disturbance.   Respiratory: Negative for cough, chest tightness and shortness of breath.    Cardiovascular: Negative for chest pain, palpitations and leg swelling.   Gastrointestinal: Negative for abdominal pain, blood in stool, constipation, diarrhea, nausea and vomiting.   Genitourinary: Negative for dysuria, frequency, hematuria and urgency.   Musculoskeletal: Negative for arthralgias, back pain, joint swelling and myalgias.   Skin: Negative for color change and rash.   Allergic/Immunologic: Negative for immunocompromised state.   Neurological: Negative for dizziness, seizures, syncope, weakness and headaches.   Hematological: Negative for adenopathy. Does not bruise/bleed easily.   Psychiatric/Behavioral: Negative for agitation, dysphoric mood, sleep disturbance and suicidal ideas. The patient is not nervous/anxious.          Objective:     Vitals:    08/09/19 1617   BP: 114/72   Pulse: 89   Temp: 98.3 °F (36.8 °C)   TempSrc: Oral   SpO2: 98%   Weight: 95.6 kg (210 lb 12.2 oz)   Height: 5' (1.524 m)          Physical Exam   Constitutional: She is oriented to person, place, and time. She appears  well-developed and well-nourished.   + obesity with Body mass index is 41.16 kg/m².   HENT:   Head: Normocephalic and atraumatic.   Right Ear: External ear normal.   Left Ear: External ear normal.   Nose: Nose normal.   Mouth/Throat: Oropharynx is clear and moist. No oropharyngeal exudate.   Eyes: Pupils are equal, round, and reactive to light. EOM are normal.   Neck: Normal range of motion. Neck supple. No JVD present. No tracheal deviation present. No thyromegaly present.   Cardiovascular: Normal rate, regular rhythm and normal heart sounds.   No murmur heard.  Pulmonary/Chest: Effort normal and breath sounds normal. No stridor. No respiratory distress. She has no wheezes. She has no rales.   Abdominal: Soft. She exhibits no distension. There is no tenderness. There is no guarding.   Musculoskeletal: Normal range of motion. She exhibits no edema.   Lymphadenopathy:     She has no cervical adenopathy.   Neurological: She is alert and oriented to person, place, and time. No cranial nerve deficit. Coordination normal.   Skin: Skin is warm and dry. No rash noted.   Psychiatric: She has a normal mood and affect.         Assessment:         ICD-10-CM ICD-9-CM   1. Type 2 diabetes mellitus without complication, without long-term current use of insulin E11.9 250.00   2. Essential hypertension I10 401.9   3. Hyperlipidemia associated with type 2 diabetes mellitus E11.69 250.80    E78.5 272.4   4. Acquired hypothyroidism E03.9 244.9   5. NAFLD (nonalcoholic fatty liver disease) K76.0 571.8   6. KELLY on CPAP G47.33 327.23    Z99.89 V46.8   7. Need for Streptococcus pneumoniae vaccination Z23 V03.82       Plan:       Type 2 diabetes mellitus without complication, without long-term current use of insulin  -  Controlled on lifestyle modifications.  -  Reports she just had eye exam done a few weeks ago - she will get diabetic eye exam form completed and sent back to me.    -  Recheck labs in 3 months.  -     Comprehensive  metabolic panel; Future; Expected date: 08/09/2019  -     Hemoglobin A1c; Future; Expected date: 08/09/2019  -     Pneumococcal Polysaccharide Vaccine (23 Valent) (SQ/IM)    Essential hypertension  -  Controlled on present medications.  Will send refill request when needed.  Recheck in 3 months.    Hyperlipidemia associated with type 2 diabetes mellitus  -  Controlled on Rosuvastatin at present dose.  Recheck in 6 months.    Acquired hypothyroidism  -  Levels controlled on present medication- recheck in 6 months.    NAFLD (nonalcoholic fatty liver disease)  -  Continue to work on weight loss    KELLY on CPAP  -  Use CPAP nightly    Need for Streptococcus pneumoniae vaccination  -     Pneumococcal Polysaccharide Vaccine (23 Valent) (SQ/IM)      Follow up in about 3 months (around 11/19/2019) for fasting labs and follow up.     Patient's Medications   New Prescriptions    No medications on file   Previous Medications    AMLODIPINE (NORVASC) 5 MG TABLET    Take 1 tablet (5 mg total) by mouth once daily.    ASPIRIN (ECOTRIN) 81 MG EC TABLET    Take 81 mg by mouth once daily.    LEVOTHYROXINE (SYNTHROID) 112 MCG TABLET    TAKE 1 TABLET BEFORE BREAKFAST    LISINOPRIL-HYDROCHLOROTHIAZIDE (PRINZIDE,ZESTORETIC) 20-25 MG TAB    Take 1 tablet by mouth once daily.    ROSUVASTATIN (CRESTOR) 10 MG TABLET    Take 1 tablet (10 mg total) by mouth once daily.   Modified Medications    No medications on file   Discontinued Medications    No medications on file

## 2019-09-04 ENCOUNTER — TELEPHONE (OUTPATIENT)
Dept: FAMILY MEDICINE | Facility: CLINIC | Age: 59
End: 2019-09-04

## 2019-09-04 DIAGNOSIS — R92.8 ABNORMAL MAMMOGRAM OF LEFT BREAST: ICD-10-CM

## 2019-09-04 DIAGNOSIS — Z12.39 SCREENING FOR BREAST CANCER: Primary | ICD-10-CM

## 2019-09-04 NOTE — TELEPHONE ENCOUNTER
Called pt and informed her of Kymberly's instructions (mammogram was abnormal, [facility] wants to do additional testing, US and 3D mammo). Informed pt that orders were already sent to [email or web address] and that someone from their clinic should be in contact with her to schedule. Pt verbalized understanding.

## 2019-09-04 NOTE — TELEPHONE ENCOUNTER
Called pt on both numbers. Left voice message to give the office a call back to discuss recent mammogram results from [facility] and the further testing they want to do on breast. Will call back

## 2019-09-05 ENCOUNTER — TELEPHONE (OUTPATIENT)
Dept: ADMINISTRATIVE | Facility: HOSPITAL | Age: 59
End: 2019-09-05

## 2019-09-17 ENCOUNTER — TELEPHONE (OUTPATIENT)
Dept: ADMINISTRATIVE | Facility: HOSPITAL | Age: 59
End: 2019-09-17

## 2019-09-26 ENCOUNTER — PATIENT MESSAGE (OUTPATIENT)
Dept: FAMILY MEDICINE | Facility: CLINIC | Age: 59
End: 2019-09-26

## 2019-09-26 DIAGNOSIS — I10 ESSENTIAL HYPERTENSION: ICD-10-CM

## 2019-09-26 DIAGNOSIS — E78.5 HYPERLIPIDEMIA, UNSPECIFIED HYPERLIPIDEMIA TYPE: ICD-10-CM

## 2019-09-26 DIAGNOSIS — E03.9 ACQUIRED HYPOTHYROIDISM: ICD-10-CM

## 2019-09-26 RX ORDER — LISINOPRIL AND HYDROCHLOROTHIAZIDE 20; 25 MG/1; MG/1
1 TABLET ORAL DAILY
Qty: 90 TABLET | Refills: 0 | Status: SHIPPED | OUTPATIENT
Start: 2019-09-26 | End: 2019-12-30 | Stop reason: SDUPTHER

## 2019-09-26 RX ORDER — ROSUVASTATIN CALCIUM 10 MG/1
10 TABLET, COATED ORAL DAILY
Qty: 90 TABLET | Refills: 0 | Status: SHIPPED | OUTPATIENT
Start: 2019-09-26 | End: 2019-12-30 | Stop reason: SDUPTHER

## 2019-09-26 RX ORDER — AMLODIPINE BESYLATE 5 MG/1
5 TABLET ORAL DAILY
Qty: 90 TABLET | Refills: 0 | Status: SHIPPED | OUTPATIENT
Start: 2019-09-26 | End: 2019-12-30 | Stop reason: SDUPTHER

## 2019-09-26 RX ORDER — LEVOTHYROXINE SODIUM 112 UG/1
TABLET ORAL
Qty: 90 TABLET | Refills: 0 | Status: SHIPPED | OUTPATIENT
Start: 2019-09-26 | End: 2019-12-30 | Stop reason: SDUPTHER

## 2019-09-26 NOTE — TELEPHONE ENCOUNTER
Called and spoke with pt in regards of needing medication refills due to her insurance change as of August 1, 2019. Pt states can she please get a 30 day supply or greater on all of her medications, until she can setup the mail order service with her new insurance. Please advise.

## 2019-10-14 ENCOUNTER — CLINICAL SUPPORT (OUTPATIENT)
Dept: FAMILY MEDICINE | Facility: CLINIC | Age: 59
End: 2019-10-14
Payer: COMMERCIAL

## 2019-10-14 DIAGNOSIS — Z23 NEED FOR INFLUENZA VACCINATION: Primary | ICD-10-CM

## 2019-10-14 PROCEDURE — 90686 IIV4 VACC NO PRSV 0.5 ML IM: CPT | Mod: S$GLB,,, | Performed by: NURSE PRACTITIONER

## 2019-10-14 PROCEDURE — 90471 FLU VACCINE (QUAD) GREATER THAN OR EQUAL TO 3YO PRESERVATIVE FREE IM: ICD-10-PCS | Mod: S$GLB,,, | Performed by: NURSE PRACTITIONER

## 2019-10-14 PROCEDURE — 90471 IMMUNIZATION ADMIN: CPT | Mod: S$GLB,,, | Performed by: NURSE PRACTITIONER

## 2019-10-14 PROCEDURE — 99999 PR PBB SHADOW E&M-EST. PATIENT-LVL I: CPT | Mod: PBBFAC,,,

## 2019-10-14 PROCEDURE — 99999 PR PBB SHADOW E&M-EST. PATIENT-LVL I: ICD-10-PCS | Mod: PBBFAC,,,

## 2019-10-14 PROCEDURE — 90686 FLU VACCINE (QUAD) GREATER THAN OR EQUAL TO 3YO PRESERVATIVE FREE IM: ICD-10-PCS | Mod: S$GLB,,, | Performed by: NURSE PRACTITIONER

## 2019-10-14 NOTE — PROGRESS NOTES
Pt came in requesting an influenza vaccine. Influenza quadrivalent administered in Right Deltoid. Pt tolerated procedure well. [facility] given upon arrival.

## 2019-11-25 ENCOUNTER — TELEPHONE (OUTPATIENT)
Dept: FAMILY MEDICINE | Facility: CLINIC | Age: 59
End: 2019-11-25

## 2019-11-25 NOTE — TELEPHONE ENCOUNTER
----- Message from Ponce [place] sent at 11/25/2019  1:41 PM CST -----  Contact: [phone]/self  Patient requesting to speak with you concerning rescheduling her appointment   Please call back to assist at [phone]

## 2019-11-25 NOTE — TELEPHONE ENCOUNTER
----- Message from Violet Martinez sent at 11/25/2019  1:54 PM CST -----  Contact: [phone]/self   Patient states she is returning your call to reschedule her appt. Leave detailed vociemail if no answer. Please advise.

## 2019-12-03 ENCOUNTER — TELEPHONE (OUTPATIENT)
Dept: FAMILY MEDICINE | Facility: CLINIC | Age: 59
End: 2019-12-03

## 2019-12-03 NOTE — TELEPHONE ENCOUNTER
----- Message from Emely Nick sent at 12/3/2019 11:12 AM CST -----  Contact: Self [phone]  Patient is running late. Please advise.

## 2019-12-03 NOTE — TELEPHONE ENCOUNTER
Patient came into the office for her appt. Informed pt she would have to reschedule due to her running late for her appt. Pt understood and reschedule her appt to  12/10/2019 with APRIL Rodriguez.

## 2019-12-10 ENCOUNTER — OFFICE VISIT (OUTPATIENT)
Dept: FAMILY MEDICINE | Facility: CLINIC | Age: 59
End: 2019-12-10
Payer: COMMERCIAL

## 2019-12-10 ENCOUNTER — TELEPHONE (OUTPATIENT)
Dept: ADMINISTRATIVE | Facility: HOSPITAL | Age: 59
End: 2019-12-10

## 2019-12-10 ENCOUNTER — PATIENT OUTREACH (OUTPATIENT)
Dept: ADMINISTRATIVE | Facility: HOSPITAL | Age: 59
End: 2019-12-10

## 2019-12-10 VITALS
TEMPERATURE: 98 F | RESPIRATION RATE: 16 BRPM | OXYGEN SATURATION: 98 % | WEIGHT: 215.38 LBS | DIASTOLIC BLOOD PRESSURE: 66 MMHG | HEART RATE: 77 BPM | SYSTOLIC BLOOD PRESSURE: 114 MMHG | BODY MASS INDEX: 42.28 KG/M2 | HEIGHT: 60 IN

## 2019-12-10 DIAGNOSIS — E66.01 MORBID OBESITY WITH BMI OF 40.0-44.9, ADULT: ICD-10-CM

## 2019-12-10 DIAGNOSIS — I10 ESSENTIAL HYPERTENSION: ICD-10-CM

## 2019-12-10 DIAGNOSIS — E11.9 TYPE 2 DIABETES MELLITUS WITHOUT COMPLICATION, WITHOUT LONG-TERM CURRENT USE OF INSULIN: Primary | ICD-10-CM

## 2019-12-10 DIAGNOSIS — Z13.0 SCREENING FOR DEFICIENCY ANEMIA: ICD-10-CM

## 2019-12-10 DIAGNOSIS — G47.33 OSA ON CPAP: ICD-10-CM

## 2019-12-10 DIAGNOSIS — E78.5 HYPERLIPIDEMIA ASSOCIATED WITH TYPE 2 DIABETES MELLITUS: ICD-10-CM

## 2019-12-10 DIAGNOSIS — E11.69 HYPERLIPIDEMIA ASSOCIATED WITH TYPE 2 DIABETES MELLITUS: ICD-10-CM

## 2019-12-10 DIAGNOSIS — K76.0 NAFLD (NONALCOHOLIC FATTY LIVER DISEASE): ICD-10-CM

## 2019-12-10 DIAGNOSIS — E03.9 ACQUIRED HYPOTHYROIDISM: ICD-10-CM

## 2019-12-10 PROCEDURE — 3008F PR BODY MASS INDEX (BMI) DOCUMENTED: ICD-10-PCS | Mod: CPTII,S$GLB,, | Performed by: NURSE PRACTITIONER

## 2019-12-10 PROCEDURE — 3078F PR MOST RECENT DIASTOLIC BLOOD PRESSURE < 80 MM HG: ICD-10-PCS | Mod: CPTII,S$GLB,, | Performed by: NURSE PRACTITIONER

## 2019-12-10 PROCEDURE — 3008F BODY MASS INDEX DOCD: CPT | Mod: CPTII,S$GLB,, | Performed by: NURSE PRACTITIONER

## 2019-12-10 PROCEDURE — 3044F PR MOST RECENT HEMOGLOBIN A1C LEVEL <7.0%: ICD-10-PCS | Mod: CPTII,S$GLB,, | Performed by: NURSE PRACTITIONER

## 2019-12-10 PROCEDURE — 3078F DIAST BP <80 MM HG: CPT | Mod: CPTII,S$GLB,, | Performed by: NURSE PRACTITIONER

## 2019-12-10 PROCEDURE — 3044F HG A1C LEVEL LT 7.0%: CPT | Mod: CPTII,S$GLB,, | Performed by: NURSE PRACTITIONER

## 2019-12-10 PROCEDURE — 99999 PR PBB SHADOW E&M-EST. PATIENT-LVL IV: ICD-10-PCS | Mod: PBBFAC,,, | Performed by: NURSE PRACTITIONER

## 2019-12-10 PROCEDURE — 99214 OFFICE O/P EST MOD 30 MIN: CPT | Mod: S$GLB,,, | Performed by: NURSE PRACTITIONER

## 2019-12-10 PROCEDURE — 99999 PR PBB SHADOW E&M-EST. PATIENT-LVL IV: CPT | Mod: PBBFAC,,, | Performed by: NURSE PRACTITIONER

## 2019-12-10 PROCEDURE — 99214 PR OFFICE/OUTPT VISIT, EST, LEVL IV, 30-39 MIN: ICD-10-PCS | Mod: S$GLB,,, | Performed by: NURSE PRACTITIONER

## 2019-12-10 PROCEDURE — 3074F PR MOST RECENT SYSTOLIC BLOOD PRESSURE < 130 MM HG: ICD-10-PCS | Mod: CPTII,S$GLB,, | Performed by: NURSE PRACTITIONER

## 2019-12-10 PROCEDURE — 3074F SYST BP LT 130 MM HG: CPT | Mod: CPTII,S$GLB,, | Performed by: NURSE PRACTITIONER

## 2019-12-10 NOTE — PROGRESS NOTES
Subjective:       Patient ID: Digna Bhatia is a 58 y.o. female.    Chief Complaint: Follow-up (lab results )    Patient is a 58-year-old white female with Type 2 Diabetes definitively diagnosed in August 2019, hypertension, hyperlipidemia, hypothyroidism, fatty liver disease noted on ultrasound May 2017 with elevated liver enzymes, and obstructive sleep apnea with CPAP use that is here today for follow up with fasting lab results.     Patient has hypertension that is currently controlled on present medications, amlodipine and lisinopril HCT.  /66 (BP Location: Left arm, Patient Position: Sitting, BP Method: Large (Manual))   Pulse 77   Temp 97.6 °F (36.4 °C) (Oral)   Resp 16   Ht 5' (1.524 m)   Wt 97.7 kg (215 lb 6.2 oz)   LMP  (LMP Unknown)   SpO2 98%   BMI 42.07 kg/m²        Patient has hypothyroidism that is controlled on present medication, Synthroid 112 µg daily when labs last checked in August 2019.     Patient has TYPE 2 DIABETES controlled with lifestyle modfications.  BASED on IFG, patient has been diabetic since October 2018 BUT BASED ON HgbA1C, her A1C was only 5.8% in October 2018 and did not elevated to 6.4% until April 2019.  Her HgbA1C did go down to 6.0% in August 2019 BUT her FBG worsened to 151 so we classified patient as Type 2 Diabetes as of August 2019 visit and agreed to monitor with lifestyle modifications.  Patient is now here for follow up.  Her FBG is now 123 with HgbA1C of 6.2%.       Patient has hyperlipidemia controlled on Crestor 10 mg daily.  LDL 81.8 in August 2019 - will recheck with next blood draw.     Patient has always had an elevated total bilirubin in the past but her other liver enzymes started to climb in May 2017.  Patient was sent to GI for evaluation.  She has had a negative hepatitis panel and a liver ultrasound in May 2017 showed fatty liver disease.  Patient's  liver enzymes have returned to normal range..  Advised patient to continue lifestyle  modifications.     Patient reports she has obstructive sleep apnea and she has followed up with the specialists and has now been using her CPAP machine.                Component      Latest Ref Rng & Units 11/19/2019 8/6/2019 4/3/2019 10/4/2018   Sodium      136 - 145 mmol/L 141 139 141 138   Potassium      3.5 - 5.1 mmol/L 4.4 4.5 4.6 4.3   Chloride      95 - 110 mmol/L 102 104 104 102   CO2      23 - 29 mmol/L 30 (H) 27 29 28   Glucose      70 - 110 mg/dL 123 (H) 151 (H) 148 (H) 135 (H)   BUN, Bld      7 - 17 mg/dL 24 (H) 19 (H) 20 (H) 20 (H)   Creatinine      0.50 - 1.40 mg/dL 0.77 0.77 0.78 0.77   Calcium      8.7 - 10.5 mg/dL 9.5 9.4 9.7 9.4   PROTEIN TOTAL      6.0 - 8.4 g/dL 7.5 7.4 6.9 7.1   Albumin      3.5 - 5.2 g/dL 4.6 4.5 4.0 4.2   BILIRUBIN TOTAL      0.1 - 1.0 mg/dL 1.7 (H) 1.8 (H) 1.4 (H) 1.2 (H)   Alkaline Phosphatase      38 - 126 U/L 90 76 76 79   AST      15 - 46 U/L 30 24 24 22   ALT      10 - 44 U/L 30 28 30 24   Anion Gap      8 - 16 mmol/L 9 8 8 8   eGFR if African American      >60 mL/min/1.73 m:2 >60.0 >60.0 >60.0 >60.0   eGFR if non African American      >60 mL/min/1.73 m:2 >60.0 >60.0 >60.0 >60.0   Cholesterol      120 - 199 mg/dL  151  135   Triglycerides      30 - 150 mg/dL  71  73   HDL      40 - 75 mg/dL  55  50   LDL Cholesterol External      63.0 - 159.0 mg/dL  81.8  70.4   Hdl/Cholesterol Ratio      20.0 - 50.0 %  36.4  37.0   Total Cholesterol/HDL Ratio      2.0 - 5.0  2.7  2.7   Non-HDL Cholesterol      mg/dL  96  85   Hemoglobin A1C External      4.0 - 5.6 % 6.2 (H) 6.0 (H) 6.4 (H) 5.8 (H)   Estimated Avg Glucose      68 - 131 mg/dL 131 126 137 (H) 120   TSH      0.400 - 4.000 uIU/mL  3.160  1.430   Free T4      0.71 - 1.51 ng/dL  0.97  1.10       Current Outpatient Medications   Medication Sig Dispense Refill    amLODIPine (NORVASC) 5 MG tablet Take 1 tablet (5 mg total) by mouth once daily. 90 tablet 0    aspirin (ECOTRIN) 81 MG EC tablet Take 81 mg by mouth once daily.       levothyroxine (SYNTHROID) 112 MCG tablet TAKE 1 TABLET BEFORE BREAKFAST 90 tablet 0    lisinopril-hydrochlorothiazide (PRINZIDE,ZESTORETIC) 20-25 mg Tab Take 1 tablet by mouth once daily. 90 tablet 0    rosuvastatin (CRESTOR) 10 MG tablet Take 1 tablet (10 mg total) by mouth once daily. 90 tablet 0     No current facility-administered medications for this visit.        Past Medical History:   Diagnosis Date    H/[facility] mammogram 3/31/2016    [facility]    Hyperlipidemia 2017    Hypertension     Hyperthyroidism     Hypothyroidism     IFG (impaired fasting glucose)     NAFLD (nonalcoholic fatty liver disease)     KELLY (obstructive sleep apnea)     on CPAP    KELLY on CPAP     Screening for colorectal cancer 2016    Type 2 diabetes mellitus without complication, without long-term current use of insulin 2019       Past Surgical History:   Procedure Laterality Date     SECTION      COLONOSCOPY N/A 2016    Procedure: COLONOSCOPY-Miralax split prep ;  Surgeon: Cali Oliveira Jr., MD;  Location: [facility];  Service: Endoscopy;  Laterality: N/A;    DILATION AND CURETTAGE OF UTERUS         Family History   Problem Relation Age of Onset    Dementia Mother     Parkinsonism Mother         PASSED AGE 78    Hypertension Father     Cancer Father         KIDNEY PASSED AGE 70    Heart disease Father 50        HEART ATTACK    Diabetes Father     Hypertension Sister     Diabetes Brother     Hypertension Brother     Diabetes Sister     Hypertension Sister     Hypertension Sister     Hypertension Brother     No Known Problems Daughter     No Known Problems Son     No Known Problems Son        Social History     Socioeconomic History    Marital status:      Spouse name: Not on file    Number of children: Not on file    Years of education: Not on file    Highest education level: Not on file   Occupational History    Not on file   Social Needs    Financial resource  strain: Not on file    Food insecurity:     Worry: Not on file     Inability: Not on file    Transportation needs:     Medical: Not on file     Non-medical: Not on file   Tobacco Use    Smoking status: Never Smoker    Smokeless tobacco: Never Used   Substance and Sexual Activity    Alcohol use: Yes     Comment: rare    Drug use: No    Sexual activity: Not Currently   Lifestyle    Physical activity:     Days per week: Not on file     Minutes per session: Not on file    Stress: Not on file   Relationships    Social connections:     Talks on phone: Not on file     Gets together: Not on file     Attends Shinto service: Not on file     Active member of club or organization: Not on file     Attends meetings of clubs or organizations: Not on file     Relationship status: Not on file   Other Topics Concern    Not on file   Social History Narrative    Not on file       Review of Systems   Constitutional: Negative for appetite change, chills, fatigue, fever and unexpected weight change.   HENT: Negative for congestion, ear pain, mouth sores, nosebleeds, postnasal drip, rhinorrhea, sinus pressure, sneezing, sore throat, trouble swallowing and voice change.    Eyes: Negative for photophobia, pain, discharge, redness, itching and visual disturbance.   Respiratory: Negative for cough, chest tightness and shortness of breath.    Cardiovascular: Negative for chest pain, palpitations and leg swelling.   Gastrointestinal: Negative for abdominal pain, blood in stool, constipation, diarrhea, nausea and vomiting.   Genitourinary: Negative for dysuria, frequency, hematuria and urgency.   Musculoskeletal: Negative for arthralgias, back pain, joint swelling and myalgias.   Skin: Negative for color change and rash.   Allergic/Immunologic: Negative for immunocompromised state.   Neurological: Negative for dizziness, seizures, syncope, weakness and headaches.   Hematological: Negative for adenopathy. Does not bruise/bleed  easily.   Psychiatric/Behavioral: Negative for agitation, dysphoric mood, sleep disturbance and suicidal ideas. The patient is not nervous/anxious.          Objective:     Vitals:    12/10/19 1416   BP: 114/66   BP Location: Left arm   Patient Position: Sitting   BP Method: Large (Manual)   Pulse: 77   Resp: 16   Temp: 97.6 °F (36.4 °C)   TempSrc: Oral   SpO2: 98%   Weight: 97.7 kg (215 lb 6.2 oz)   Height: 5' (1.524 m)          Physical Exam   Constitutional: She is oriented to person, place, and time. She appears well-developed and well-nourished.   + obesity with Body mass index is 42.07 kg/m².   HENT:   Head: Normocephalic and atraumatic.   Right Ear: External ear normal.   Left Ear: External ear normal.   Nose: Nose normal.   Mouth/Throat: Oropharynx is clear and moist. No oropharyngeal exudate.   Eyes: Pupils are equal, round, and reactive to light. EOM are normal.   Neck: Normal range of motion. Neck supple. No JVD present. No tracheal deviation present. No thyromegaly present.   Cardiovascular: Normal rate, regular rhythm and normal heart sounds.   No murmur heard.  Pulses:       Dorsalis pedis pulses are 2+ on the left side.   Pulmonary/Chest: Effort normal and breath sounds normal. No stridor. No respiratory distress. She has no wheezes. She has no rales.   Abdominal: Soft. She exhibits no distension. There is no tenderness. There is no guarding.   Musculoskeletal: Normal range of motion. She exhibits no edema.   Feet:   Right Foot:   Protective Sensation: 7 sites tested. 7 sites sensed.   Skin Integrity: Negative for ulcer or skin breakdown.   Left Foot:   Protective Sensation: 7 sites tested. 7 sites sensed.   Skin Integrity: Negative for ulcer or skin breakdown.   Lymphadenopathy:     She has no cervical adenopathy.   Neurological: She is alert and oriented to person, place, and time. No cranial nerve deficit. Coordination normal.   Skin: Skin is warm and dry. No rash noted.   Psychiatric: She has a  normal mood and affect.         Assessment:         ICD-10-CM ICD-9-CM   1. Type 2 diabetes mellitus without complication, without long-term current use of insulin E11.9 250.00   2. Hyperlipidemia associated with type 2 diabetes mellitus E11.69 250.80    E78.5 272.4   3. Essential hypertension I10 401.9   4. Acquired hypothyroidism E03.9 244.9   5. NAFLD (nonalcoholic fatty liver disease) K76.0 571.8   6. KELLY on CPAP G47.33 327.23    Z99.89 V46.8   7. Screening for deficiency anemia Z13.0 V78.1   8. Morbid obesity with BMI of 40.0-44.9, adult E66.01 278.01    Z68.41 V85.41       Plan:       Type 2 diabetes mellitus without complication, without long-term current use of insulin  -  controlled with lifestyle modifications.  Recheck in 4 months  -     Comprehensive metabolic panel; Future; Expected date: 12/10/2019  -     Hemoglobin A1c; Future; Expected date: 12/10/2019    Hyperlipidemia associated with type 2 diabetes mellitus  -  controlled on current medication.  Will send refill request when needed.  Get fasting labs and wellness exam in April 2020.  -     Lipid panel; Future; Expected date: 12/10/2019    Essential hypertension  -  controlled on current medication.  Will send refill request when needed.  Get fasting labs and wellness exam in April 2020.    Acquired hypothyroidism  -  controlled on current medication.  Will send refill request when needed.  Get fasting labs and wellness exam in April 2020.  -     TSH; Future; Expected date: 12/10/2019  -     T4, free; Future; Expected date: 12/10/2019    NAFLD (nonalcoholic fatty liver disease)  -  continue to work on weight loss and low-fat diet    KELLY on CPAP  -  try to use CPAP machine daily    Screening for deficiency anemia  -     CBC auto differential; Future; Expected date: 12/10/2019      Follow up in about 4 months (around 4/10/2020) for fasting labs and WELLNESS EXAM.     Patient's Medications   New Prescriptions    No medications on file   Previous  Medications    AMLODIPINE (NORVASC) 5 MG TABLET    Take 1 tablet (5 mg total) by mouth once daily.    ASPIRIN (ECOTRIN) 81 MG EC TABLET    Take 81 mg by mouth once daily.    LEVOTHYROXINE (SYNTHROID) 112 MCG TABLET    TAKE 1 TABLET BEFORE BREAKFAST    LISINOPRIL-HYDROCHLOROTHIAZIDE (PRINZIDE,ZESTORETIC) 20-25 MG TAB    Take 1 tablet by mouth once daily.    ROSUVASTATIN (CRESTOR) 10 MG TABLET    Take 1 tablet (10 mg total) by mouth once daily.   Modified Medications    No medications on file   Discontinued Medications    No medications on file

## 2019-12-28 ENCOUNTER — PATIENT MESSAGE (OUTPATIENT)
Dept: FAMILY MEDICINE | Facility: CLINIC | Age: 59
End: 2019-12-28

## 2019-12-28 DIAGNOSIS — E78.5 HYPERLIPIDEMIA, UNSPECIFIED HYPERLIPIDEMIA TYPE: ICD-10-CM

## 2019-12-28 DIAGNOSIS — E03.9 ACQUIRED HYPOTHYROIDISM: ICD-10-CM

## 2019-12-28 DIAGNOSIS — I10 ESSENTIAL HYPERTENSION: ICD-10-CM

## 2019-12-30 RX ORDER — LEVOTHYROXINE SODIUM 112 UG/1
TABLET ORAL
Qty: 90 TABLET | Refills: 0 | Status: SHIPPED | OUTPATIENT
Start: 2019-12-30 | End: 2020-03-30 | Stop reason: SDUPTHER

## 2019-12-30 RX ORDER — LISINOPRIL AND HYDROCHLOROTHIAZIDE 20; 25 MG/1; MG/1
1 TABLET ORAL DAILY
Qty: 90 TABLET | Refills: 0 | Status: SHIPPED | OUTPATIENT
Start: 2019-12-30 | End: 2020-03-30 | Stop reason: SDUPTHER

## 2019-12-30 RX ORDER — AMLODIPINE BESYLATE 5 MG/1
5 TABLET ORAL DAILY
Qty: 90 TABLET | Refills: 0 | Status: SHIPPED | OUTPATIENT
Start: 2019-12-30 | End: 2020-03-30 | Stop reason: SDUPTHER

## 2019-12-30 RX ORDER — ROSUVASTATIN CALCIUM 10 MG/1
10 TABLET, COATED ORAL DAILY
Qty: 90 TABLET | Refills: 0 | Status: SHIPPED | OUTPATIENT
Start: 2019-12-30 | End: 2020-03-30 | Stop reason: SDUPTHER

## 2020-01-02 ENCOUNTER — PATIENT MESSAGE (OUTPATIENT)
Dept: FAMILY MEDICINE | Facility: CLINIC | Age: 60
End: 2020-01-02

## 2020-01-12 NOTE — PROGRESS NOTES
Digna Bhatia  was seen as f/u for mgt of KELLY. Last seen by MD Jenkins 7/26/17, this is my initial visit with her.     She has since undergone a home sleep study which we reviewed together today in detail. Was intolerable to cpap mask in the past. +teeth grinding. Denies am headaches. +snoring, + disrupted sleep. +apneic pauses. She is serious now about sleeping better. Her sleep sucks. Has machine at home, not sure if can still use or not, would need new supplies.       HISTORY:  07/26/2017 INITIAL HISTORY OF PRESENT ILLNESS:  Digna Bhatia is a 56 y.o. female is here to be evaluated for a sleep disorder.       CHIEF COMPLAINT:      The patient's complaints include excessive daytime sleepiness, excessive daytime fatigue, snoring,  witnessed breathing pauses,  gasping for air in sleep and interrupted sleep since  Many years ago.    She had PSG 5 years ago. Was diagnosed with KELLY. She could not tolerate CPAP machine (FFM or nasal pillows). It was over 5 years ago    Has to wear  for teeth grinding - not very compliant with wearing it.    Denies  dry mouth and sore throat  Denies nasal congestion   Denies  morning headaches  Denies  interrupted sleep  Denies frequent leg movements  Denies symptoms concerning for parasomnia    The ESS ([place] Sleepiness Score) taken on initial visit is 5 /24      SLEEP ROUTINE AND LIFESTYLE 11/16/2017 :    Bed partner:      Time to bed - wake up time on a workday : 10:30 to 6:45  Time to bed - wake up time on a day off: 11:30 to  9 AM  Sleep onset latency: 30 min  Disruptions or awakenings: 1-2  Time to fall back into sleep: 20 min   Perceived sleep quality: 3/5  Perceived total sleep time:  6  hours.  Daytime naps: noneusually    Exercise routine: not enough  Caffeine:       ROS: In addition to above, 5# gain. Otherwise a balance review of 10-systems is negative.     PHYSICAL EXAM:  BP (!) 150/84   Pulse 74   Ht 5' (1.524 m)   Wt 95.6 kg (210 lb 12.2 oz)    BMI 41.16 kg/m²   GENERAL: morbid obese body habitus, well groomed.      Using [facility]: yes   HST AHI 33(RDI 46)/Min SpO2: 77.2%      ASSESSMENT:    1. KELLY (obstructive sleep apnea), severe. Ready to retrial using PAP therapy with adherence monitoring.   She has  medical co-morbidities of hyperlipidemia, morbid obesity, and hypertension,  which can be worsened by KELLY.      PLAN:  We discussed potential treatment options, which could include weight loss (10-15%), body positioning, continuous positive airway pressure (CPAP-defintive), mandibular advancement splint by dentist, or referral for surgical consideration. Discussed etiology of KELLY and potential ramifications of untreated KELLY, including heart disease, hypertension, cognitive difficulties, stroke, and diabetes.      Encouraged weight loss efforts for potential improvement of KELLY and overall health benefits  See PCP re: hypothyroidism, hyperlipidemia and BP mgt    APAP 6-16cm setup today or send pic of her machine/see if can covert to apap mode. [facility] DME supplies/machine.     RTC 4-5 wks after beginning PAP therapy/ensure successful acclimation   no

## 2020-03-09 ENCOUNTER — TELEPHONE (OUTPATIENT)
Dept: FAMILY MEDICINE | Facility: CLINIC | Age: 60
End: 2020-03-09

## 2020-03-09 NOTE — TELEPHONE ENCOUNTER
----- Message from iVolet Martinez sent at 3/9/2020  3:43 PM CDT -----  Contact: [phone]/self   Patient is requesting to speak with nurse regarding scheduling an appointment next week . Please advise.

## 2020-03-10 ENCOUNTER — OFFICE VISIT (OUTPATIENT)
Dept: FAMILY MEDICINE | Facility: CLINIC | Age: 60
End: 2020-03-10
Payer: COMMERCIAL

## 2020-03-10 ENCOUNTER — TELEPHONE (OUTPATIENT)
Dept: FAMILY MEDICINE | Facility: CLINIC | Age: 60
End: 2020-03-10

## 2020-03-10 VITALS
SYSTOLIC BLOOD PRESSURE: 124 MMHG | OXYGEN SATURATION: 98 % | TEMPERATURE: 98 F | BODY MASS INDEX: 42.16 KG/M2 | HEART RATE: 85 BPM | HEIGHT: 60 IN | WEIGHT: 214.75 LBS | RESPIRATION RATE: 16 BRPM | DIASTOLIC BLOOD PRESSURE: 76 MMHG

## 2020-03-10 DIAGNOSIS — M72.2 PLANTAR FASCIITIS, BILATERAL: ICD-10-CM

## 2020-03-10 DIAGNOSIS — M79.672 HEEL PAIN, BILATERAL: Primary | ICD-10-CM

## 2020-03-10 DIAGNOSIS — E11.9 TYPE 2 DIABETES MELLITUS WITHOUT COMPLICATION, WITHOUT LONG-TERM CURRENT USE OF INSULIN: ICD-10-CM

## 2020-03-10 DIAGNOSIS — M79.671 HEEL PAIN, BILATERAL: Primary | ICD-10-CM

## 2020-03-10 PROCEDURE — 99999 PR PBB SHADOW E&M-EST. PATIENT-LVL IV: CPT | Mod: PBBFAC,,, | Performed by: NURSE PRACTITIONER

## 2020-03-10 PROCEDURE — 3044F PR MOST RECENT HEMOGLOBIN A1C LEVEL <7.0%: ICD-10-PCS | Mod: CPTII,S$GLB,, | Performed by: NURSE PRACTITIONER

## 2020-03-10 PROCEDURE — 3074F PR MOST RECENT SYSTOLIC BLOOD PRESSURE < 130 MM HG: ICD-10-PCS | Mod: CPTII,S$GLB,, | Performed by: NURSE PRACTITIONER

## 2020-03-10 PROCEDURE — 3074F SYST BP LT 130 MM HG: CPT | Mod: CPTII,S$GLB,, | Performed by: NURSE PRACTITIONER

## 2020-03-10 PROCEDURE — 99214 PR OFFICE/OUTPT VISIT, EST, LEVL IV, 30-39 MIN: ICD-10-PCS | Mod: S$GLB,,, | Performed by: NURSE PRACTITIONER

## 2020-03-10 PROCEDURE — 3044F HG A1C LEVEL LT 7.0%: CPT | Mod: CPTII,S$GLB,, | Performed by: NURSE PRACTITIONER

## 2020-03-10 PROCEDURE — 3008F BODY MASS INDEX DOCD: CPT | Mod: CPTII,S$GLB,, | Performed by: NURSE PRACTITIONER

## 2020-03-10 PROCEDURE — 3008F PR BODY MASS INDEX (BMI) DOCUMENTED: ICD-10-PCS | Mod: CPTII,S$GLB,, | Performed by: NURSE PRACTITIONER

## 2020-03-10 PROCEDURE — 3078F DIAST BP <80 MM HG: CPT | Mod: CPTII,S$GLB,, | Performed by: NURSE PRACTITIONER

## 2020-03-10 PROCEDURE — 3078F PR MOST RECENT DIASTOLIC BLOOD PRESSURE < 80 MM HG: ICD-10-PCS | Mod: CPTII,S$GLB,, | Performed by: NURSE PRACTITIONER

## 2020-03-10 PROCEDURE — 99999 PR PBB SHADOW E&M-EST. PATIENT-LVL IV: ICD-10-PCS | Mod: PBBFAC,,, | Performed by: NURSE PRACTITIONER

## 2020-03-10 PROCEDURE — 99214 OFFICE O/P EST MOD 30 MIN: CPT | Mod: S$GLB,,, | Performed by: NURSE PRACTITIONER

## 2020-03-10 RX ORDER — DICLOFENAC SODIUM 75 MG/1
75 TABLET, DELAYED RELEASE ORAL 2 TIMES DAILY
Qty: 60 TABLET | Refills: 1 | Status: SHIPPED | OUTPATIENT
Start: 2020-03-10 | End: 2020-05-28 | Stop reason: SDUPTHER

## 2020-03-10 NOTE — PROGRESS NOTES
Subjective:       Patient ID: Digna Bhatia is a 59 y.o. female.    Chief Complaint: Foot Pain (pain in both feet )    Foot Injury    Incident onset: NEW COMPLAINT - NO injury - just pain to the heels intermittently for past several months. There was no injury mechanism. Pain location: left heel is worse than right. Quality: reports pain under the HEELS of foot feels like a bad bruise/ aching pain and then around the outside of heel is a throbbing pain. The pain is at a severity of 8/10. The pain is moderate. The pain has been fluctuating since onset. Pertinent negatives include no inability to bear weight, loss of motion, loss of sensation, numbness or tingling. Associated symptoms comments: Pain with palpation and weight bearing - patient is a diabetic but symptoms not consistent with neuropathy.. She reports no foreign bodies present. The symptoms are aggravated by movement and weight bearing. She has tried NSAIDs and elevation for the symptoms. The treatment provided no relief.       Current Outpatient Medications   Medication Sig Dispense Refill    amLODIPine (NORVASC) 5 MG tablet Take 1 tablet (5 mg total) by mouth once daily. 90 tablet 0    aspirin (ECOTRIN) 81 MG EC tablet Take 81 mg by mouth once daily.      levothyroxine (SYNTHROID) 112 MCG tablet TAKE 1 TABLET BEFORE BREAKFAST 90 tablet 0    lisinopril-hydrochlorothiazide (PRINZIDE,ZESTORETIC) 20-25 mg Tab Take 1 tablet by mouth once daily. 90 tablet 0    rosuvastatin (CRESTOR) 10 MG tablet Take 1 tablet (10 mg total) by mouth once daily. 90 tablet 0     No current facility-administered medications for this visit.        Past Medical History:   Diagnosis Date    H/O mammogram 3/31/2016    has done at [facility]    Hyperlipidemia 9/12/2017    Hypertension     Hyperthyroidism     Hypothyroidism     IFG (impaired fasting glucose)     NAFLD (nonalcoholic fatty liver disease)     KELLY (obstructive sleep apnea)     on CPAP    KELLY on CPAP     Screening  for colorectal cancer 2016    Type 2 diabetes mellitus without complication, without long-term current use of insulin 2019       Past Surgical History:   Procedure Laterality Date     SECTION      COLONOSCOPY N/A 2016    Procedure: COLONOSCOPY-Miralax split prep ;  Surgeon: Cali Oliveira Jr., MD;  Location: [facility];  Service: Endoscopy;  Laterality: N/A;    DILATION AND CURETTAGE OF UTERUS         Family History   Problem Relation Age of Onset    Dementia Mother     Parkinsonism Mother         PASSED AGE 78    Hypertension Father     Cancer Father         KIDNEY PASSED AGE 70    Heart disease Father 50        HEART ATTACK    Diabetes Father     Hypertension Sister     Diabetes Brother     Hypertension Brother     Diabetes Sister     Hypertension Sister     Hypertension Sister     Hypertension Brother     No Known Problems Daughter     No Known Problems Son     No Known Problems Son        Social History     Socioeconomic History    Marital status:      Spouse name: Not on file    Number of children: Not on file    Years of education: Not on file    Highest education level: Not on file   Occupational History    Not on file   Social Needs    Financial resource strain: Not on file    Food insecurity:     Worry: Not on file     Inability: Not on file    Transportation needs:     Medical: Not on file     Non-medical: Not on file   Tobacco Use    Smoking status: Never Smoker    Smokeless tobacco: Never Used   Substance and Sexual Activity    Alcohol use: Yes     Comment: rare    Drug use: No    Sexual activity: Not Currently   Lifestyle    Physical activity:     Days per week: Not on file     Minutes per session: Not on file    Stress: Not on file   Relationships    Social connections:     Talks on phone: Not on file     Gets together: Not on file     Attends Taoism service: Not on file     Active member of club or organization: Not on file      Attends meetings of clubs or organizations: Not on file     Relationship status: Not on file   Other Topics Concern    Not on file   Social History Narrative    Not on file       Review of Systems   Constitutional: Negative for appetite change, chills, fatigue, fever and unexpected weight change.   HENT: Negative for congestion, ear pain, mouth sores, nosebleeds, postnasal drip, rhinorrhea, sinus pressure, sneezing, sore throat, trouble swallowing and voice change.    Eyes: Negative for photophobia, pain, discharge, redness, itching and visual disturbance.   Respiratory: Negative for cough, chest tightness and shortness of breath.    Cardiovascular: Negative for chest pain, palpitations and leg swelling.   Gastrointestinal: Negative for abdominal pain, blood in stool, constipation, diarrhea, nausea and vomiting.   Genitourinary: Negative for dysuria, frequency, hematuria and urgency.   Musculoskeletal: Negative for back pain and myalgias.        Heel pain   Skin: Negative for color change, rash and wound.   Allergic/Immunologic: Negative for immunocompromised state.   Neurological: Negative for dizziness, tingling, seizures, syncope, weakness, numbness and headaches.   Hematological: Negative for adenopathy. Does not bruise/bleed easily.   Psychiatric/Behavioral: Negative for agitation, dysphoric mood, sleep disturbance and suicidal ideas. The patient is not nervous/anxious.          Objective:     Vitals:    03/10/20 1152   BP: 124/76   BP Location: Left arm   Patient Position: Sitting   BP Method: Large (Manual)   Pulse: 85   Resp: 16   Temp: 97.8 °F (36.6 °C)   TempSrc: Oral   SpO2: 98%   Weight: 97.4 kg (214 lb 11.7 oz)   Height: 5' (1.524 m)          Physical Exam   Constitutional: She is oriented to person, place, and time. She appears well-developed and well-nourished.   + obesity with Body mass index is 41.94 kg/m².     HENT:   Head: Normocephalic and atraumatic.   Right Ear: External ear normal.   Left  Ear: External ear normal.   Nose: Nose normal.   Mouth/Throat: Oropharynx is clear and moist. No oropharyngeal exudate.   Eyes: Pupils are equal, round, and reactive to light. EOM are normal.   Neck: Normal range of motion. Neck supple. No JVD present. No tracheal deviation present. No thyromegaly present.   Cardiovascular: Normal rate, regular rhythm and normal heart sounds.   No murmur heard.  Pulmonary/Chest: Effort normal and breath sounds normal. No stridor. No respiratory distress. She has no wheezes. She has no rales.   Abdominal: Soft. She exhibits no distension. There is no tenderness. There is no guarding.   Musculoskeletal: Normal range of motion. She exhibits no edema.        Left foot: There is normal range of motion, no swelling, no crepitus and no deformity.        Feet:    Pain to bilateral heels described as an aching bruising pain that worsens with palpation and weight bearing.  She also reports pain along the bottom edge of sole that she gets an intermittent shooting/throbbing pain.  Left foot worse than right.  NO deformity, no swelling,    Lymphadenopathy:     She has no cervical adenopathy.   Neurological: She is alert and oriented to person, place, and time. No cranial nerve deficit. Coordination normal.   Skin: Skin is warm and dry. No rash noted.   Psychiatric: She has a normal mood and affect.         Assessment:         ICD-10-CM ICD-9-CM   1. Heel pain, bilateral M79.671 729.5    M79.672    2. Plantar fasciitis, bilateral M72.2 728.71   3. Type 2 diabetes mellitus without complication, without long-term current use of insulin E11.9 250.00       Plan:       Heel pain, bilateral  -  Gave handouts on plantar fasciitis stretches for foot  -  Avoid all bare foot and flat foot shoes.  -  Wear shoes with arch support.  -  Take NSAID twice daily scheduled until completed.  -  Will reassess in 4 weeks when patient returns for her wellness exam - if still present, will refer to podiatry for  evaluation.  -     diclofenac (VOLTAREN) 75 MG EC tablet; Take 1 tablet (75 mg total) by mouth 2 (two) times daily.  Dispense: 60 tablet; Refill: 1    Plantar fasciitis, bilateral  -     diclofenac (VOLTAREN) 75 MG EC tablet; Take 1 tablet (75 mg total) by mouth 2 (two) times daily.  Dispense: 60 tablet; Refill: 1    Type 2 diabetes mellitus without complication, without long-term current use of insulin      Follow up if symptoms worsen or fail to improve.     Patient's Medications   New Prescriptions    DICLOFENAC (VOLTAREN) 75 MG EC TABLET    Take 1 tablet (75 mg total) by mouth 2 (two) times daily.   Previous Medications    AMLODIPINE (NORVASC) 5 MG TABLET    Take 1 tablet (5 mg total) by mouth once daily.    ASPIRIN (ECOTRIN) 81 MG EC TABLET    Take 81 mg by mouth once daily.    LEVOTHYROXINE (SYNTHROID) 112 MCG TABLET    TAKE 1 TABLET BEFORE BREAKFAST    LISINOPRIL-HYDROCHLOROTHIAZIDE (PRINZIDE,ZESTORETIC) 20-25 MG TAB    Take 1 tablet by mouth once daily.    ROSUVASTATIN (CRESTOR) 10 MG TABLET    Take 1 tablet (10 mg total) by mouth once daily.   Modified Medications    No medications on file   Discontinued Medications    No medications on file

## 2020-03-10 NOTE — PATIENT INSTRUCTIONS
Understanding Plantar Fasciitis    Plantar fasciitis is a condition that causes foot and heel pain. The plantar fascia is a tough band of tissue that runs across the bottom of the foot from the heel to the toes. This tissue pulls on the heel bone. It supports the arch of the foot as it pushes off the ground. If the tissue becomes irritated or red and swollen (inflamed), it is called plantar fasciitis.  How to say it  PLAN-tuhr [id number]-IY-[id number]   What causes plantar fasciitis?  Plantar fasciitis most often occurs from overusing the plantar fascia. The tissue may become damaged from activities that put repeated stress on the heel and foot. Or it may wear down over time with age and ankle stiffness. You are more likely to have plantar fasciitis if you:  · Do activities that require a lot of running, jumping, or dancing  · Have a job that requires being on your feet for long periods  · Are overweight or obese  · Have certain foot problems, such as a tight Achilles tendon, flat feet, or high arches  · Often wear poorly fitting shoes  Symptoms of plantar fasciitis  The condition most often causes pain in the heel and the bottom of the foot. The pain may occur when you take your first steps in the morning. It may get better as you walk throughout the day. But as you continue to put weight on the foot, the pain often returns. Pain may also occur after standing or sitting for long periods.  Treating plantar fasciitis  Treatments for plantar fasciitis include:  · Resting the foot. This involves limiting movements that make your foot hurt. You may also need to avoid certain sports and types of work for a time.  · Using cold packs. Put an ice pack on the heel and foot to help reduce pain and swelling.  · Taking pain medicines. Prescription and over-the-counter pain medicines can help relieve pain and swelling.  · Using heel cups or foot inserts (orthotics). These are placed in the shoes to help support the heel or arch and  cushion the heel. You may also be told to buy proper-fitting shoes with good arch support and cushioned soles.  · Taping the foot. This supports the arch and limits the movement of the plantar fascia to help relieve symptoms.  · Wearing a night splint. This stretches the plantar fascia and leg muscles while you sleep. This may help relieve pain.  · Doing exercises and physical therapy. These stretch and strengthen the plantar fascia and the muscles in the leg that support the heel and foot.  · Getting shots of medicine into the foot. These may help relieve symptoms for a time.  · Having surgery. This may be needed if other treatments fail to relieve symptoms. During surgery, the surgeon may partially cut the plantar fascia to release tension.  Possible complications of plantar fasciitis  Without proper care and treatment, healing may take longer than normal. Also, symptoms may continue or get worse. Over time, the plantar fascia may be damaged. This can make it hard to walk or even stand without pain.  When to call your healthcare provider  Call your healthcare provider right away if you have any of these:  · Fever of 100.4°F (38°C) or higher, or as directed  · Symptoms that dont get better with treatment, or get worse  · New symptoms, such as numbness, tingling, or weakness in the foot   Date Last Reviewed: 3/10/2016  © 3374-0850 [employer]. [address] [place] [address]. All rights reserved. This information is not intended as a substitute for professional medical care. Always follow your healthcare professional's instructions.      Heel Spurs    The plantar fascia is a thick, fibrous layer of tissue that covers the bones on the bottom of your foot. It holds the foot bones in an arched position. Plantar fasciitis is a painful swelling of the plantar fascia.  A heel spur is an overgrowth of bone where the plantar fascia attaches to the heel bone. The heel spur itself usually doesnt  cause pain. However, the heel spur might be a sign of plantar fasciitis which may cause your foot pain. There is no specific treatment for heel spurs.   Plantar fasciitis can develop slowly or suddenly. It usually affects one foot at a time. Heel pain can feel sharp, like a knife sticking into the bottom of your foot. You may feel pain after exercising, long-distance jogging, stair climbing, long periods of standing, or after standing up.  Risk factors for plantar fasciitis include: arthritis, diabetes, obesity or recent weight gain, flat foot, and having high arches. Wearing high heels, loose shoes, or shoes with poor arch support adds to the risk.    Foot pain is usually worse in the morning. But it improves with walking. By the end of the day there may be a dull aching. Treatment includes short-term rest and controlling inflammation. It may take up to 9 months before all symptoms go away. In rare cases, a steroid injection in the foot, or surgery, may be needed.  Home care  · If you are overweight, lose weight to help healing.  · Choose supportive shoes with good arch support and shock absorbency. Replace athletic shoes when they become worn out. Dont walk or run barefoot.  · Premade or custom-fitted shoe inserts may be helpful. Inserts made of silicone seem to be the most effective. Custom-made inserts can be provided by a podiatrist or foot specialist, physical therapist, or orthopedist.  · Premade or custom-made night splints keep the heel stretched out while you sleep. They may prevent morning pain.  · Avoid activities that stress the feet: jogging, prolonged standing or walking, contact sports, etc.  · First thing in the morning and before sports, stretch the bottom of your foot. Gently flex your ankle so the toes move toward your knee.  · Icing may help control heel pain. Apply an ice pack to the heel for 10-20 minutes as a preventive. Or ice your heel after a severe flare-up of symptoms. You may repeat  this every 1-2 hours as needed.  · You may use over-the-counter pain medicine to control pain, unless another medicine was prescribed. Anti-inflammatory pain medicines, such as ibuprofen or naproxen, may work better than acetaminophen. If you have chronic liver or kidney disease or ever had a stomach ulcer or GI bleeding, talk with your healthcare provider before using these medicines.  · Shoe inserts, a night splint, or a special boot may be needed. Use these as directed by your healthcare provider.  Follow-up care   Follow up with your healthcare provider, physical therapist, or podiatrist or foot specialist as advised.  When to seek medical advice  Call your healthcare provider right away if any of these occur:  · The pain worsens.  · There is no relief after a few weeks of home treatment.   Date Last Reviewed: 11/23/2015  © 8497-0517 [employer]. [address] [place] [address]. All rights reserved. This information is not intended as a substitute for professional medical care. Always follow your healthcare professional's instructions.

## 2020-03-10 NOTE — TELEPHONE ENCOUNTER
----- Message from Galina Sanon sent at 3/10/2020 10:35 AM CDT -----  Contact: self  Type:  Patient running late  Request    Name of Caller:patient states running late about 15 -30minutes late need to speak with nurse to see if stil lbe able to be seen.  When is the first available appointment?  Symptoms:  Best Call Back Number:[phone]  Additional Information:

## 2020-03-30 ENCOUNTER — PATIENT MESSAGE (OUTPATIENT)
Dept: FAMILY MEDICINE | Facility: CLINIC | Age: 60
End: 2020-03-30

## 2020-03-30 DIAGNOSIS — I10 ESSENTIAL HYPERTENSION: ICD-10-CM

## 2020-03-30 DIAGNOSIS — E03.9 ACQUIRED HYPOTHYROIDISM: ICD-10-CM

## 2020-03-30 DIAGNOSIS — E78.5 HYPERLIPIDEMIA, UNSPECIFIED HYPERLIPIDEMIA TYPE: ICD-10-CM

## 2020-03-30 RX ORDER — LEVOTHYROXINE SODIUM 112 UG/1
TABLET ORAL
Qty: 90 TABLET | Refills: 0 | Status: SHIPPED | OUTPATIENT
Start: 2020-03-30 | End: 2020-07-07

## 2020-03-30 RX ORDER — AMLODIPINE BESYLATE 5 MG/1
5 TABLET ORAL DAILY
Qty: 90 TABLET | Refills: 0 | Status: SHIPPED | OUTPATIENT
Start: 2020-03-30 | End: 2020-07-07

## 2020-03-30 RX ORDER — ROSUVASTATIN CALCIUM 10 MG/1
10 TABLET, COATED ORAL DAILY
Qty: 90 TABLET | Refills: 0 | Status: SHIPPED | OUTPATIENT
Start: 2020-03-30 | End: 2020-07-07

## 2020-03-30 RX ORDER — LISINOPRIL AND HYDROCHLOROTHIAZIDE 20; 25 MG/1; MG/1
1 TABLET ORAL DAILY
Qty: 90 TABLET | Refills: 0 | Status: SHIPPED | OUTPATIENT
Start: 2020-03-30 | End: 2020-07-07

## 2020-05-05 ENCOUNTER — PATIENT MESSAGE (OUTPATIENT)
Dept: FAMILY MEDICINE | Facility: CLINIC | Age: 60
End: 2020-05-05

## 2020-05-05 NOTE — TELEPHONE ENCOUNTER
Advised patient that refills were sent back in March for 90day supply. Patient states she only received 30day supply. Called pharmacy and they have the refill for 90day but patient insurance only covers 30day supply but patient still has partial refill at pharmacy. Advised patient she still has refills to  from pharmacy.

## 2020-05-28 ENCOUNTER — OFFICE VISIT (OUTPATIENT)
Dept: FAMILY MEDICINE | Facility: CLINIC | Age: 60
End: 2020-05-28
Payer: COMMERCIAL

## 2020-05-28 VITALS
OXYGEN SATURATION: 97 % | RESPIRATION RATE: 16 BRPM | HEIGHT: 60 IN | WEIGHT: 213.94 LBS | DIASTOLIC BLOOD PRESSURE: 64 MMHG | BODY MASS INDEX: 42 KG/M2 | TEMPERATURE: 97 F | SYSTOLIC BLOOD PRESSURE: 112 MMHG | HEART RATE: 92 BPM

## 2020-05-28 DIAGNOSIS — E11.9 TYPE 2 DIABETES MELLITUS WITHOUT COMPLICATION, WITHOUT LONG-TERM CURRENT USE OF INSULIN: ICD-10-CM

## 2020-05-28 DIAGNOSIS — K76.0 NAFLD (NONALCOHOLIC FATTY LIVER DISEASE): ICD-10-CM

## 2020-05-28 DIAGNOSIS — I10 ESSENTIAL HYPERTENSION: ICD-10-CM

## 2020-05-28 DIAGNOSIS — Z00.00 ANNUAL PHYSICAL EXAM: Primary | ICD-10-CM

## 2020-05-28 DIAGNOSIS — E03.9 ACQUIRED HYPOTHYROIDISM: ICD-10-CM

## 2020-05-28 DIAGNOSIS — M79.671 HEEL PAIN, BILATERAL: ICD-10-CM

## 2020-05-28 DIAGNOSIS — M72.2 PLANTAR FASCIITIS, BILATERAL: ICD-10-CM

## 2020-05-28 DIAGNOSIS — G47.33 OSA ON CPAP: ICD-10-CM

## 2020-05-28 DIAGNOSIS — E78.5 HYPERLIPIDEMIA ASSOCIATED WITH TYPE 2 DIABETES MELLITUS: ICD-10-CM

## 2020-05-28 DIAGNOSIS — Z11.4 SCREENING FOR HIV WITHOUT PRESENCE OF RISK FACTORS: ICD-10-CM

## 2020-05-28 DIAGNOSIS — M79.672 HEEL PAIN, BILATERAL: ICD-10-CM

## 2020-05-28 DIAGNOSIS — E66.01 MORBID OBESITY WITH BMI OF 40.0-44.9, ADULT: ICD-10-CM

## 2020-05-28 DIAGNOSIS — E11.69 HYPERLIPIDEMIA ASSOCIATED WITH TYPE 2 DIABETES MELLITUS: ICD-10-CM

## 2020-05-28 PROCEDURE — 3044F HG A1C LEVEL LT 7.0%: CPT | Mod: CPTII,S$GLB,, | Performed by: NURSE PRACTITIONER

## 2020-05-28 PROCEDURE — 99396 PREV VISIT EST AGE 40-64: CPT | Mod: S$GLB,,, | Performed by: NURSE PRACTITIONER

## 2020-05-28 PROCEDURE — 99999 PR PBB SHADOW E&M-EST. PATIENT-LVL V: CPT | Mod: PBBFAC,,, | Performed by: NURSE PRACTITIONER

## 2020-05-28 PROCEDURE — 3044F PR MOST RECENT HEMOGLOBIN A1C LEVEL <7.0%: ICD-10-PCS | Mod: CPTII,S$GLB,, | Performed by: NURSE PRACTITIONER

## 2020-05-28 PROCEDURE — 3078F PR MOST RECENT DIASTOLIC BLOOD PRESSURE < 80 MM HG: ICD-10-PCS | Mod: CPTII,S$GLB,, | Performed by: NURSE PRACTITIONER

## 2020-05-28 PROCEDURE — 99396 PR PREVENTIVE VISIT,EST,40-64: ICD-10-PCS | Mod: S$GLB,,, | Performed by: NURSE PRACTITIONER

## 2020-05-28 PROCEDURE — 3074F SYST BP LT 130 MM HG: CPT | Mod: CPTII,S$GLB,, | Performed by: NURSE PRACTITIONER

## 2020-05-28 PROCEDURE — 3074F PR MOST RECENT SYSTOLIC BLOOD PRESSURE < 130 MM HG: ICD-10-PCS | Mod: CPTII,S$GLB,, | Performed by: NURSE PRACTITIONER

## 2020-05-28 PROCEDURE — 99999 PR PBB SHADOW E&M-EST. PATIENT-LVL V: ICD-10-PCS | Mod: PBBFAC,,, | Performed by: NURSE PRACTITIONER

## 2020-05-28 PROCEDURE — 3078F DIAST BP <80 MM HG: CPT | Mod: CPTII,S$GLB,, | Performed by: NURSE PRACTITIONER

## 2020-05-28 RX ORDER — DICLOFENAC SODIUM 75 MG/1
75 TABLET, DELAYED RELEASE ORAL 2 TIMES DAILY
Qty: 60 TABLET | Refills: 0 | Status: SHIPPED | OUTPATIENT
Start: 2020-05-28 | End: 2020-12-01 | Stop reason: ALTCHOICE

## 2020-05-28 RX ORDER — METFORMIN HYDROCHLORIDE 500 MG/1
500 TABLET, EXTENDED RELEASE ORAL
Qty: 90 TABLET | Refills: 1 | Status: SHIPPED | OUTPATIENT
Start: 2020-05-28 | End: 2020-12-08 | Stop reason: SDUPTHER

## 2020-05-28 NOTE — PROGRESS NOTES
Subjective:       Patient ID: Digna Bahtia is a 59 y.o. female.    Chief Complaint: Annual Exam    Patient is a 59-year-old white female with Type 2 Diabetes definitively diagnosed in August 2019 but Prediabetic since October 2018, hypertension, hyperlipidemia, hypothyroidism, fatty liver disease noted on ultrasound May 2017 with elevated liver enzymes, and obstructive sleep apnea with CPAP use that is here today for annual physical exam with fasting lab results.     Patient has hypertension that is currently controlled on present medications, amlodipine and lisinopril HCT.  /64   Pulse 92   Temp 97.3 °F (36.3 °C) (Oral)   Resp 16   Ht 5' (1.524 m)   Wt 97 kg (213 lb 15.3 oz)   LMP  (LMP Unknown)   SpO2 97%   BMI 41.79 kg/m²      Patient has hypothyroidism that is controlled on present medication, Synthroid 112 µg daily with TSH 1.58 and free T4 1.22.     Patient has TYPE 2 DIABETES UNCONTROLLED with lifestyle modfications.  BASED on IFG, patient has been diabetic since October 2018 BUT BASED ON HgbA1C, her A1C was only 5.8% in October 2018 and did not elevated to 6.4% until April 2019.  Her HgbA1C did go down to 6.0% in August 2019 BUT her FBG worsened to 151 so we classified patient as Type 2 Diabetes as of August 2019 visit and agreed to monitor with lifestyle modifications.  BUT TODAY her FBG was 174 with HgbA1C 6.7% -agreed to start medication.  Will start patient on Metformin  mg daily and agreed to recheck in 6 months since A1C still <7%.     Patient has hyperlipidemia controlled on Crestor 10 mg daily.  LDL 76.8.     Patient has always had an elevated total bilirubin in the past but her other liver enzymes started to climb in May 2017.  Patient was sent to GI for evaluation.  She has had a negative hepatitis panel and a liver ultrasound in May 2017 showed FATTY LIVER DISEASE.  Patient's  liver enzymes have returned to normal range..  Advised patient to continue lifestyle  modifications.     Patient reports she has obstructive sleep apnea and she has followed up with the specialists and has now been using her CPAP machine.    Patient was seen in March 2024 bilateral heel pain.  Patient was diagnosed with bilateral plantar fasciitis and put on diclofenac twice daily.  Patient was advised to avoid all barefoot and flatfoot shoes, use arch support and would refer to podiatry if not better.  Patient reports that the diclofenac helped for the 1st 3 weeks but then it stopped working despite her not getting off of the medication.  She reports her here pain continues to worsen.  Will refer to podiatry.    Wellness labs:  -  CBC within normal limits  -  CMP is okay other than impaired fasting glucose 174.  Patient's bilirubin is 1.9 but has chronic elevations with rest of liver enzymes normal.  This has already been evaluated by GI.  -  cholesterol is controlled on current medication  -  thyroid is controlled on current medication    Health maintenance:  -  will get HIV screening test with next blood draw  -  patient is aware she is due for diabetic eye exam in June 2020         Component      Latest Ref Rng & Units 5/21/2020 11/19/2019 8/6/2019 4/3/2019   WBC      3.90 - 12.70 K/uL 6.42      RBC      4.00 - 5.40 M/uL 4.46      Hemoglobin      12.0 - 16.0 g/dL 13.3      Hematocrit      37.0 - 48.5 % 39.9      MCV      82 - 98 fL 90      MCH      27.0 - 31.0 pg 29.8      MCHC      32.0 - 36.0 g/dL 33.3      RDW      11.5 - 14.5 % 12.9      Platelets      150 - 350 K/uL 230      MPV      9.2 - 12.9 fL 10.3      Immature Granulocytes      0.0 - 0.5 % 0.3      Gran # (ANC)      1.8 - 7.7 K/uL 3.8      Immature Grans (Abs)      0.00 - 0.04 K/uL 0.02      Lymph #      1.0 - 4.8 K/uL 1.5      Mono #      0.3 - 1.0 K/uL 0.6      Eos #      0.0 - 0.5 K/uL 0.4      Baso #      0.00 - 0.20 K/uL 0.13      nRBC      0 /100 WBC 0      Gran%      38.0 - 73.0 % 58.7      Lymph%      18.0 - 48.0 % 23.4       Mono%      4.0 - 15.0 % 10.0      Eosinophil%      0.0 - 8.0 % 5.6      Basophil%      0.0 - 1.9 % 2.0 (H)      Differential Method       Automated      Sodium      136 - 145 mmol/L 136 141 139 141   Potassium      3.5 - 5.1 mmol/L 4.1 4.4 4.5 4.6   Chloride      95 - 110 mmol/L 101 102 104 104   CO2      23 - 29 mmol/L 28 30 (H) 27 29   Glucose      70 - 110 mg/dL 174 (H) 123 (H) 151 (H) 148 (H)   BUN, Bld      7 - 17 mg/dL 19 (H) 24 (H) 19 (H) 20 (H)   Creatinine      0.50 - 1.40 mg/dL 0.79 0.77 0.77 0.78   Calcium      8.7 - 10.5 mg/dL 9.8 9.5 9.4 9.7   PROTEIN TOTAL      6.0 - 8.4 g/dL 7.3 7.5 7.4 6.9   Albumin      3.5 - 5.2 g/dL 4.2 4.6 4.5 4.0   BILIRUBIN TOTAL      0.1 - 1.0 mg/dL 1.9 (H) 1.7 (H) 1.8 (H) 1.4 (H)   Alkaline Phosphatase      38 - 126 U/L 94 90 76 76   AST      15 - 46 U/L 34 30 24 24   ALT      10 - 44 U/L 33 30 28 30   Anion Gap      8 - 16 mmol/L 7 (L) 9 8 8   eGFR if African American      >60 mL/min/1.73 m:2 >60.0 >60.0 >60.0 >60.0   eGFR if non African American      >60 mL/min/1.73 m:2 >60.0 >60.0 >60.0 >60.0   Cholesterol      120 - 199 mg/dL 148  151    Triglycerides      30 - 150 mg/dL 91  71    HDL      40 - 75 mg/dL 53  55    LDL Cholesterol External      63.0 - 159.0 mg/dL 76.8  81.8    Hdl/Cholesterol Ratio      20.0 - 50.0 % 35.8  36.4    Total Cholesterol/HDL Ratio      2.0 - 5.0 2.8  2.7    Non-HDL Cholesterol      mg/dL 95  96    Hemoglobin A1C External      4.0 - 5.6 % 6.7 (H) 6.2 (H) 6.0 (H) 6.4 (H)   Estimated Avg Glucose      68 - 131 mg/dL 146 (H) 131 126 137 (H)   TSH      0.400 - 4.000 uIU/mL 1.580  3.160    Free T4      0.71 - 1.51 ng/dL 1.22  0.97      Current Outpatient Medications   Medication Sig Dispense Refill    amLODIPine (NORVASC) 5 MG tablet Take 1 tablet (5 mg total) by mouth once daily. 90 tablet 0    aspirin (ECOTRIN) 81 MG EC tablet Take 81 mg by mouth once daily.      diclofenac (VOLTAREN) 75 MG EC tablet Take 1 tablet (75 mg total) by mouth 2 (two)  times daily. 60 tablet 1    levothyroxine (SYNTHROID) 112 MCG tablet TAKE 1 TABLET BEFORE BREAKFAST 90 tablet 0    lisinopriL-hydrochlorothiazide (PRINZIDE,ZESTORETIC) 20-25 mg Tab Take 1 tablet by mouth once daily. 90 tablet 0    rosuvastatin (CRESTOR) 10 MG tablet Take 1 tablet (10 mg total) by mouth once daily. 90 tablet 0     No current facility-administered medications for this visit.        Past Medical History:   Diagnosis Date    H/O mammogram 3/31/2016    [facility]    Hyperlipidemia 2017    Hypertension     Hyperthyroidism     Hypothyroidism     IFG (impaired fasting glucose)     NAFLD (nonalcoholic fatty liver disease)     KELLY (obstructive sleep apnea)     on CPAP    KELLY on CPAP     Screening for colorectal cancer 2016    Type 2 diabetes mellitus without complication, without long-term current use of insulin 2019       Past Surgical History:   Procedure Laterality Date     SECTION      COLONOSCOPY N/A 2016    Procedure: COLONOSCOPY-Miralax split prep ;  Surgeon: Cali Oliveira Jr., MD;  Location: [facility];  Service: Endoscopy;  Laterality: N/A;    DILATION AND CURETTAGE OF UTERUS         Family History   Problem Relation Age of Onset    Dementia Mother     Parkinsonism Mother         PASSED AGE 78    Hypertension Father     Cancer Father         KIDNEY PASSED AGE 70    Heart disease Father 50        HEART ATTACK    Diabetes Father     Hypertension Sister     Diabetes Brother     Hypertension Brother     Diabetes Sister     Hypertension Sister     Hypertension Sister     Hypertension Brother     No Known Problems Daughter     No Known Problems Son     No Known Problems Son        Social History     Socioeconomic History    Marital status:      Spouse name: Not on file    Number of children: Not on file    Years of education: Not on file    Highest education level: Not on file   Occupational History    Not on file   Social  Needs    Financial resource strain: Not on file    Food insecurity:     Worry: Not on file     Inability: Not on file    Transportation needs:     Medical: Not on file     Non-medical: Not on file   Tobacco Use    Smoking status: Never Smoker    Smokeless tobacco: Never Used   Substance and Sexual Activity    Alcohol use: Yes     Comment: rare    Drug use: No    Sexual activity: Not Currently   Lifestyle    Physical activity:     Days per week: Not on file     Minutes per session: Not on file    Stress: Not on file   Relationships    Social connections:     Talks on phone: Not on file     Gets together: Not on file     Attends Restorationist service: Not on file     Active member of club or organization: Not on file     Attends meetings of clubs or organizations: Not on file     Relationship status: Not on file   Other Topics Concern    Not on file   Social History Narrative    Not on file       Review of Systems   Constitutional: Negative for appetite change, chills, fatigue, fever and unexpected weight change.   HENT: Negative for congestion, ear pain, mouth sores, nosebleeds, postnasal drip, rhinorrhea, sinus pressure, sneezing, sore throat, trouble swallowing and voice change.    Eyes: Negative for photophobia, pain, discharge, redness, itching and visual disturbance.   Respiratory: Negative for cough, chest tightness and shortness of breath.    Cardiovascular: Negative for chest pain, palpitations and leg swelling.   Gastrointestinal: Negative for abdominal pain, blood in stool, constipation, diarrhea, nausea and vomiting.   Genitourinary: Negative for dysuria, frequency, hematuria and urgency.   Musculoskeletal: Positive for arthralgias and joint swelling. Negative for back pain and myalgias.   Skin: Negative for color change and rash.   Allergic/Immunologic: Negative for immunocompromised state.   Neurological: Negative for dizziness, seizures, syncope, weakness and headaches.   Hematological:  Negative for adenopathy. Does not bruise/bleed easily.   Psychiatric/Behavioral: Negative for agitation, dysphoric mood, sleep disturbance and suicidal ideas. The patient is not nervous/anxious.          Objective:     Vitals:    05/28/20 1641   BP: 114/68   BP Location: Left arm   Patient Position: Sitting   BP Method: Large (Manual)   Pulse: 92   Resp: 16   Temp: 97.3 °F (36.3 °C)   TempSrc: Oral   SpO2: 97%   Weight: 97 kg (213 lb 15.3 oz)   Height: 5' (1.524 m)          Physical Exam   Constitutional: She is oriented to person, place, and time. She appears well-developed and well-nourished.   + obesity with Body mass index  41.79 kg/m².       HENT:   Head: Normocephalic and atraumatic.   Right Ear: External ear normal.   Left Ear: External ear normal.   Eyes: Pupils are equal, round, and reactive to light. EOM are normal.   Neck: Normal range of motion. Neck supple. No JVD present. No tracheal deviation present. No thyromegaly present.   Cardiovascular: Normal rate, regular rhythm and normal heart sounds.   No murmur heard.  Pulmonary/Chest: Effort normal and breath sounds normal. No stridor. No respiratory distress. She has no wheezes. She has no rales.   Abdominal: Soft. She exhibits no distension. There is no tenderness. There is no guarding.   Musculoskeletal: Normal range of motion. She exhibits no edema.        Left foot: There is normal range of motion, no swelling, no crepitus and no deformity.        Feet:    Pain to bilateral heels described as an aching bruising pain that worsens with palpation and weight bearing.  She also reports pain along the bottom edge of sole that she gets an intermittent shooting/throbbing pain.  Left foot worse than right.  NO deformity, no swelling,    Lymphadenopathy:     She has no cervical adenopathy.   Neurological: She is alert and oriented to person, place, and time. No cranial nerve deficit. Coordination normal.   Skin: Skin is warm and dry. No rash noted.    Psychiatric: She has a normal mood and affect.         Assessment:         ICD-10-CM ICD-9-CM   1. Annual physical exam Z00.00 V70.0   2. Type 2 diabetes mellitus without complication, without long-term current use of insulin E11.9 250.00   3. Hyperlipidemia associated with type 2 diabetes mellitus E11.69 250.80    E78.5 272.4   4. Essential hypertension I10 401.9   5. Acquired hypothyroidism E03.9 244.9   6. NAFLD (nonalcoholic fatty liver disease) K76.0 571.8   7. KELLY on CPAP G47.33 327.23    Z99.89 V46.8   8. Heel pain, bilateral M79.671 729.5    M79.672    9. Plantar fasciitis, bilateral M72.2 728.71   10. Morbid obesity with BMI of 40.0-44.9, adult E66.01 278.01    Z68.41 V85.41   11. Screening for HIV without presence of risk factors Z11.4 V73.89       Plan:       Annual physical exam  -  HIV screen with next blood draw  -  advised to get diabetic eye exam in June 2020    Health Maintenance Summary     Eye Exam Next Due 6/18/2020     Done 6/18/2019 [employer]: WORKFLOW - HEALTHY PLANET - EXTERNAL DATA - EXTERNAL PROCEDURES DATE - EYE EXAM   HIV Screening Postponed 5/13/2021 Originally 12/12/1975. Patient Scheduled   Mammogram Next Due 9/12/2020     Done 9/12/2019 [facility]    Done 8/20/2019 [facility]MOGRAP[facility]    Done 7/12/2018 DIS-- Please see media    Done 7/12/2018 [employer]: WORKFLOW - HEALTHY PLANET - EXTERNAL DATA - EXTERNAL PROCEDURES DATE - MAMMOGRAM    Done 4/21/2017 DIS    Patient has more history with this topic...   Hemoglobin A1c Next Due 11/21/2020     Done 5/21/2020 HEMOGLOBIN A1C  Hemoglobin A1C External           Done 11/19/2019 HEMOGLOBIN A1C  Hemoglobin A1C External           Done 8/6/2019 HEMOGLOBIN A1C  Hemoglobin A1C External           Done 4/3/2019 HEMOGLOBIN A1C  Hemoglobin A1C External           Done 10/4/2018 HEMOGLOBIN A1C  Hemoglobin A1C External           Patient has more history with this topic...   Foot Exam Next Due 12/10/2020     Done 12/10/2019     Done 12/10/2019 [employer]:  WORKFLOW - DIABETES - DIABETIC FOOT EXAM PERFORMED   Lipid Panel Next Due 5/21/2021     Done 5/21/2020 LIPID PANEL    Done 8/6/2019 LIPID PANEL    Done 10/4/2018 LIPID PANEL    Done 3/9/2018 LIPID PANEL     Done 9/8/2017 LIPID PANEL     Patient has more history with this topic...   Low Dose Statin Next Due 5/28/2021     Done 5/28/2020 Registry Metric: Last Current Statin Reviewed Date    Done 3/30/2020 Registry Metric: Last Current Statin Order Date   Colonoscopy Next Due 7/29/2021     Done 7/29/2016 COLONOSCOPY   Pap Smear with HPV Cotest Next Due 8/23/2021     Done 8/23/2018 LIQUID-BASED PAP SMEAR, SCREENING    Done 3/2/2017 LIQUID-BASED PAP SMEAR, SCREENING   TETANUS VACCINE Next Due 7/26/2026     Done 7/26/2016 Imm Admin: Tdap   Hepatitis C Screening This plan is no longer active.     Done 5/5/2017 HEPATITIS PANEL, ACUTE    Done 7/21/2016 HEPATITIS C ANTIBODY   Pneumococcal Vaccine (Medium Risk) This plan is no longer active.     Done 8/9/2019 Imm Admin: Pneumococcal Polysaccharide - 23 Valent   Influenza Vaccine This plan is no longer active.     Done 10/14/2019 Imm Admin: Influenza - Quadrivalent - PF (6 months and older)    Done 10/11/2018 Imm Admin: Influenza - Quadrivalent - PF (6 months and older)    Done 10/6/2017 Imm Admin: Influenza - Quadrivalent - PF (6 months and older)    Done 10/18/2016 Imm Admin: Influenza - Quadrivalent    Done 12/2/2006 Imm Admin: Influenza - Quadrivalent - PF (6 months and older)    Patient has more history with this topic...   Shingles Vaccine This plan is no longer active.     Done 3/10/2020 Imm Admin: Zoster Recombinant    Done 11/22/2019 Imm Admin: Zoster Recombinant       Type 2 diabetes mellitus without complication, without long-term current use of insulin  -  start metformin  mg daily.  Work on lifestyle modifications.  Repeat fasting labs and follow-up in 6 months.  -     metFORMIN (GLUCOPHAGE-XR) 500 MG XR 24hr tablet; Take 1 tablet (500 mg total) by mouth daily  with breakfast.  Dispense: 90 tablet; Refill: 1  -     Comprehensive metabolic panel; Future; Expected date: 05/28/2020  -     Hemoglobin A1C; Future; Expected date: 05/28/2020    Hyperlipidemia associated with type 2 diabetes mellitus  -  continue current medication.  Send refill request when needed.  Follow-up in 6 months.  -     Lipid Panel; Future; Expected date: 05/28/2020    Essential hypertension  -  controlled on present medication.  Send refill request when needed.  Follow-up in 6 months.    Acquired hypothyroidism  -  controlled on levothyroxine at present dose  -     TSH; Future; Expected date: 05/28/2020  -     T4, free; Future; Expected date: 05/28/2020    NAFLD (nonalcoholic fatty liver disease)  -  work on low-fat diet and weight loss    KELLY on CPAP  -  use CPAP machine nightly    Heel pain, bilateral  -  get back on the diclofenac twice daily until seen by podiatrist and then they can determine medication management from there  -     diclofenac (VOLTAREN) 75 MG EC tablet; Take 1 tablet (75 mg total) by mouth 2 (two) times daily.  Dispense: 60 tablet; Refill: 0  -     Ambulatory referral/consult to Podiatry; Future; Expected date: 06/04/2020    Plantar fasciitis, bilateral  -     diclofenac (VOLTAREN) 75 MG EC tablet; Take 1 tablet (75 mg total) by mouth 2 (two) times daily.  Dispense: 60 tablet; Refill: 0    Morbid obesity  -  Body mass index is 41.79 kg/m².  Advised on lifestyle modifications to promote weight loss    Follow up in about 6 months (around 11/28/2020) for fasting labs and follow up.     Patient's Medications   New Prescriptions    METFORMIN (GLUCOPHAGE-XR) 500 MG XR 24HR TABLET    Take 1 tablet (500 mg total) by mouth daily with breakfast.   Previous Medications    AMLODIPINE (NORVASC) 5 MG TABLET    Take 1 tablet (5 mg total) by mouth once daily.    ASPIRIN (ECOTRIN) 81 MG EC TABLET    Take 81 mg by mouth once daily.    LEVOTHYROXINE (SYNTHROID) 112 MCG TABLET    TAKE 1 TABLET BEFORE  BREAKFAST    LISINOPRIL-HYDROCHLOROTHIAZIDE (PRINZIDE,ZESTORETIC) 20-25 MG TAB    Take 1 tablet by mouth once daily.    ROSUVASTATIN (CRESTOR) 10 MG TABLET    Take 1 tablet (10 mg total) by mouth once daily.   Modified Medications    Modified Medication Previous Medication    DICLOFENAC (VOLTAREN) 75 MG EC TABLET diclofenac (VOLTAREN) 75 MG EC tablet       Take 1 tablet (75 mg total) by mouth 2 (two) times daily.    Take 1 tablet (75 mg total) by mouth 2 (two) times daily.   Discontinued Medications    No medications on file

## 2020-05-29 ENCOUNTER — TELEPHONE (OUTPATIENT)
Dept: FAMILY MEDICINE | Facility: CLINIC | Age: 60
End: 2020-05-29

## 2020-06-01 ENCOUNTER — TELEPHONE (OUTPATIENT)
Dept: FAMILY MEDICINE | Facility: CLINIC | Age: 60
End: 2020-06-01

## 2020-07-06 DIAGNOSIS — I10 ESSENTIAL HYPERTENSION: ICD-10-CM

## 2020-07-06 DIAGNOSIS — E03.9 ACQUIRED HYPOTHYROIDISM: ICD-10-CM

## 2020-07-06 DIAGNOSIS — E78.5 HYPERLIPIDEMIA, UNSPECIFIED HYPERLIPIDEMIA TYPE: ICD-10-CM

## 2020-07-07 RX ORDER — ROSUVASTATIN CALCIUM 10 MG/1
TABLET, COATED ORAL
Qty: 90 TABLET | Refills: 1 | Status: SHIPPED | OUTPATIENT
Start: 2020-07-07 | End: 2021-01-06 | Stop reason: SDUPTHER

## 2020-07-07 RX ORDER — LISINOPRIL AND HYDROCHLOROTHIAZIDE 20; 25 MG/1; MG/1
TABLET ORAL
Qty: 90 TABLET | Refills: 1 | Status: SHIPPED | OUTPATIENT
Start: 2020-07-07 | End: 2021-01-06 | Stop reason: SDUPTHER

## 2020-07-07 RX ORDER — AMLODIPINE BESYLATE 5 MG/1
TABLET ORAL
Qty: 90 TABLET | Refills: 1 | Status: SHIPPED | OUTPATIENT
Start: 2020-07-07 | End: 2021-01-06 | Stop reason: SDUPTHER

## 2020-07-07 RX ORDER — LEVOTHYROXINE SODIUM 112 UG/1
TABLET ORAL
Qty: 90 TABLET | Refills: 1 | Status: SHIPPED | OUTPATIENT
Start: 2020-07-07 | End: 2021-01-06 | Stop reason: SDUPTHER

## 2020-09-11 ENCOUNTER — TELEPHONE (OUTPATIENT)
Dept: FAMILY MEDICINE | Facility: CLINIC | Age: 60
End: 2020-09-11

## 2020-09-11 ENCOUNTER — CLINICAL SUPPORT (OUTPATIENT)
Dept: FAMILY MEDICINE | Facility: CLINIC | Age: 60
End: 2020-09-11
Payer: COMMERCIAL

## 2020-09-11 DIAGNOSIS — Z23 FLU VACCINE NEED: Primary | ICD-10-CM

## 2020-09-11 PROCEDURE — 90686 IIV4 VACC NO PRSV 0.5 ML IM: CPT | Mod: S$GLB,,, | Performed by: INTERNAL MEDICINE

## 2020-09-11 PROCEDURE — 90686 FLU VACCINE (QUAD) GREATER THAN OR EQUAL TO 3YO PRESERVATIVE FREE IM: ICD-10-PCS | Mod: S$GLB,,, | Performed by: INTERNAL MEDICINE

## 2020-09-11 PROCEDURE — 90471 FLU VACCINE (QUAD) GREATER THAN OR EQUAL TO 3YO PRESERVATIVE FREE IM: ICD-10-PCS | Mod: S$GLB,,, | Performed by: INTERNAL MEDICINE

## 2020-09-11 PROCEDURE — 90471 IMMUNIZATION ADMIN: CPT | Mod: S$GLB,,, | Performed by: INTERNAL MEDICINE

## 2020-09-11 NOTE — PROGRESS NOTES
Patient came into office today for flu vaccine. Patient received vaccine to right arm tolerated well and VIS given to patient

## 2020-09-11 NOTE — TELEPHONE ENCOUNTER
----- Message from Cayden Giron sent at 9/11/2020 12:41 PM CDT -----  Regarding: flu shot reschedule  Type:  Needs Medical Advice    Who Called:  patient   Reason: patient would like to reschedule her appt. To later on today after 2:00 pm or tomorrow any time.  Would the patient rather a call back or a response via [facility]? Call back   Best Call Back Number: [phone]  Additional Information: n/a

## 2020-10-13 ENCOUNTER — PATIENT MESSAGE (OUTPATIENT)
Dept: FAMILY MEDICINE | Facility: CLINIC | Age: 60
End: 2020-10-13

## 2020-10-13 DIAGNOSIS — Z12.39 ENCOUNTER FOR SCREENING FOR MALIGNANT NEOPLASM OF BREAST, UNSPECIFIED SCREENING MODALITY: Primary | ICD-10-CM

## 2020-10-19 ENCOUNTER — PATIENT OUTREACH (OUTPATIENT)
Dept: ADMINISTRATIVE | Facility: HOSPITAL | Age: 60
End: 2020-10-19

## 2020-10-19 ENCOUNTER — TELEPHONE (OUTPATIENT)
Dept: ADMINISTRATIVE | Facility: HOSPITAL | Age: 60
End: 2020-10-19

## 2020-10-27 LAB
LEFT EYE DM RETINOPATHY: NEGATIVE
RIGHT EYE DM RETINOPATHY: NEGATIVE

## 2020-10-28 ENCOUNTER — PATIENT OUTREACH (OUTPATIENT)
Dept: ADMINISTRATIVE | Facility: HOSPITAL | Age: 60
End: 2020-10-28

## 2020-10-28 ENCOUNTER — TELEPHONE (OUTPATIENT)
Dept: ADMINISTRATIVE | Facility: HOSPITAL | Age: 60
End: 2020-10-28

## 2020-12-01 ENCOUNTER — OFFICE VISIT (OUTPATIENT)
Dept: FAMILY MEDICINE | Facility: CLINIC | Age: 60
End: 2020-12-01
Payer: COMMERCIAL

## 2020-12-01 VITALS
HEART RATE: 85 BPM | WEIGHT: 204.13 LBS | DIASTOLIC BLOOD PRESSURE: 70 MMHG | BODY MASS INDEX: 40.08 KG/M2 | OXYGEN SATURATION: 98 % | HEIGHT: 60 IN | TEMPERATURE: 97 F | SYSTOLIC BLOOD PRESSURE: 110 MMHG | RESPIRATION RATE: 18 BRPM

## 2020-12-01 DIAGNOSIS — K76.0 NAFLD (NONALCOHOLIC FATTY LIVER DISEASE): ICD-10-CM

## 2020-12-01 DIAGNOSIS — E03.9 ACQUIRED HYPOTHYROIDISM: ICD-10-CM

## 2020-12-01 DIAGNOSIS — E11.69 HYPERLIPIDEMIA ASSOCIATED WITH TYPE 2 DIABETES MELLITUS: ICD-10-CM

## 2020-12-01 DIAGNOSIS — Z13.0 SCREENING FOR DEFICIENCY ANEMIA: ICD-10-CM

## 2020-12-01 DIAGNOSIS — E11.9 TYPE 2 DIABETES MELLITUS WITHOUT COMPLICATION, WITHOUT LONG-TERM CURRENT USE OF INSULIN: Primary | ICD-10-CM

## 2020-12-01 DIAGNOSIS — E66.01 CLASS 2 SEVERE OBESITY DUE TO EXCESS CALORIES WITH SERIOUS COMORBIDITY AND BODY MASS INDEX (BMI) OF 39.0 TO 39.9 IN ADULT: ICD-10-CM

## 2020-12-01 DIAGNOSIS — E78.5 HYPERLIPIDEMIA ASSOCIATED WITH TYPE 2 DIABETES MELLITUS: ICD-10-CM

## 2020-12-01 DIAGNOSIS — I10 ESSENTIAL HYPERTENSION: ICD-10-CM

## 2020-12-01 DIAGNOSIS — G47.33 OSA ON CPAP: ICD-10-CM

## 2020-12-01 PROCEDURE — 3078F DIAST BP <80 MM HG: CPT | Mod: CPTII,S$GLB,, | Performed by: NURSE PRACTITIONER

## 2020-12-01 PROCEDURE — 99214 PR OFFICE/OUTPT VISIT, EST, LEVL IV, 30-39 MIN: ICD-10-PCS | Mod: S$GLB,,, | Performed by: NURSE PRACTITIONER

## 2020-12-01 PROCEDURE — 3074F SYST BP LT 130 MM HG: CPT | Mod: CPTII,S$GLB,, | Performed by: NURSE PRACTITIONER

## 2020-12-01 PROCEDURE — 99999 PR PBB SHADOW E&M-EST. PATIENT-LVL IV: ICD-10-PCS | Mod: PBBFAC,,, | Performed by: NURSE PRACTITIONER

## 2020-12-01 PROCEDURE — 1126F PR PAIN SEVERITY QUANTIFIED, NO PAIN PRESENT: ICD-10-PCS | Mod: S$GLB,,, | Performed by: NURSE PRACTITIONER

## 2020-12-01 PROCEDURE — 1126F AMNT PAIN NOTED NONE PRSNT: CPT | Mod: S$GLB,,, | Performed by: NURSE PRACTITIONER

## 2020-12-01 PROCEDURE — 99999 PR PBB SHADOW E&M-EST. PATIENT-LVL IV: CPT | Mod: PBBFAC,,, | Performed by: NURSE PRACTITIONER

## 2020-12-01 PROCEDURE — 3008F BODY MASS INDEX DOCD: CPT | Mod: CPTII,S$GLB,, | Performed by: NURSE PRACTITIONER

## 2020-12-01 PROCEDURE — 3044F HG A1C LEVEL LT 7.0%: CPT | Mod: CPTII,S$GLB,, | Performed by: NURSE PRACTITIONER

## 2020-12-01 PROCEDURE — 99214 OFFICE O/P EST MOD 30 MIN: CPT | Mod: S$GLB,,, | Performed by: NURSE PRACTITIONER

## 2020-12-01 PROCEDURE — 3044F PR MOST RECENT HEMOGLOBIN A1C LEVEL <7.0%: ICD-10-PCS | Mod: CPTII,S$GLB,, | Performed by: NURSE PRACTITIONER

## 2020-12-01 PROCEDURE — 3078F PR MOST RECENT DIASTOLIC BLOOD PRESSURE < 80 MM HG: ICD-10-PCS | Mod: CPTII,S$GLB,, | Performed by: NURSE PRACTITIONER

## 2020-12-01 PROCEDURE — 3008F PR BODY MASS INDEX (BMI) DOCUMENTED: ICD-10-PCS | Mod: CPTII,S$GLB,, | Performed by: NURSE PRACTITIONER

## 2020-12-01 PROCEDURE — 3074F PR MOST RECENT SYSTOLIC BLOOD PRESSURE < 130 MM HG: ICD-10-PCS | Mod: CPTII,S$GLB,, | Performed by: NURSE PRACTITIONER

## 2020-12-01 NOTE — PROGRESS NOTES
Subjective:       Patient ID: Digna Bhatia is a 59 y.o. female.    Chief Complaint: Follow-up (6 months F/U)    HPI    Patient is a 59-year-old white female with Type 2 Diabetes definitively diagnosed in August 2019 but Prediabetic since October 2018, hypertension, hyperlipidemia, hypothyroidism, fatty liver disease noted on ultrasound May 2017 with elevated liver enzymes, and obstructive sleep apnea with CPAP use that is here today for 3 month follow up with fasting lab results.     Patient has hypertension that is currently controlled on present medications, amlodipine and lisinopril HCT.  /70 (BP Location: Left arm, Patient Position: Sitting, BP Method: Large (Manual))   Pulse 85   Temp 97.4 °F (36.3 °C) (Temporal)   Resp 18   Ht 5' (1.524 m)   Wt 92.6 kg (204 lb 2.3 oz)   LMP  (LMP Unknown)   SpO2 98%   BMI 39.87 kg/m²      Patient has hypothyroidism that is controlled on present medication, Synthroid 112 µg daily with TSH 0.584 and free T4 1.29.     Patient has TYPE 2 DIABETES.  Started on Metformin  mg daily in May 2020 when FBG was 174 and HgbA1C was 6.7%.  Patient has since joined [employer] diet program in past 5 weeks and lost 10 pounds in 5 weeks.  Her FBG is down to 118 with HgbA1C 5.6%.     Patient has hyperlipidemia controlled on Crestor 10 mg daily.  Her cholesterol levels are even more improved with weight loss and dietary modifications.  LDL decreased from 76.8 down to 46.6.  Will continue medication at same dose for now but if still doing well with diet - may consider decreasing medication.     Patient has always had an elevated total bilirubin in the past but her other liver enzymes started to climb in May 2017.  Patient was sent to GI for evaluation.  She has had a negative hepatitis panel and a liver ultrasound in May 2017 showed FATTY LIVER DISEASE.  Patient's  liver enzymes have returned to normal range..  Advised patient to continue lifestyle modifications.     Patient  reports she has obstructive sleep apnea and she has followed up with the specialists and has now been using her CPAP machine.    Component      Latest Ref Rng & Units 11/24/2020 5/21/2020 11/19/2019 8/6/2019   Sodium      136 - 145 mmol/L 141 136 141 139   Potassium      3.5 - 5.1 mmol/L 4.1 4.1 4.4 4.5   Chloride      95 - 110 mmol/L 105 101 102 104   CO2      23 - 29 mmol/L 29 28 30 (H) 27   Glucose      70 - 110 mg/dL 118 (H) 174 (H) 123 (H) 151 (H)   BUN      7 - 17 mg/dL 25 (H) 19 (H) 24 (H) 19 (H)   Creatinine      0.50 - 1.40 mg/dL 0.80 0.79 0.77 0.77   Calcium      8.7 - 10.5 mg/dL 9.7 9.8 9.5 9.4   PROTEIN TOTAL      6.0 - 8.4 g/dL 7.2 7.3 7.5 7.4   Albumin      3.5 - 5.2 g/dL 4.5 4.2 4.6 4.5   BILIRUBIN TOTAL      0.1 - 1.0 mg/dL 1.9 (H) 1.9 (H) 1.7 (H) 1.8 (H)   Alkaline Phosphatase      38 - 126 U/L 72 94 90 76   AST      15 - 46 U/L 25 34 30 24   ALT      10 - 44 U/L 21 33 30 28   Anion Gap      8 - 16 mmol/L 7 (L) 7 (L) 9 8   eGFR if African American      >60 mL/min/1.73 m:2 >60.0 >60.0 >60.0 >60.0   eGFR if non African American      >60 mL/min/1.73 m:2 >60.0 >60.0 >60.0 >60.0   Cholesterol      120 - 199 mg/dL 112 (L) 148  151   Triglycerides      30 - 150 mg/dL 72 91  71   HDL      40 - 75 mg/dL 51 53  55   LDL Cholesterol External      63.0 - 159.0 mg/dL 46.6 (L) 76.8  81.8   HDL/Cholesterol Ratio      20.0 - 50.0 % 45.5 35.8  36.4   Total Cholesterol/HDL Ratio      2.0 - 5.0 2.2 2.8  2.7   Non-HDL Cholesterol      mg/dL 61 95  96   Hemoglobin A1C External      4.0 - 5.6 % 5.6 6.7 (H) 6.2 (H) 6.0 (H)   Estimated Avg Glucose      68 - 131 mg/dL 114 146 (H) 131 126   TSH      0.400 - 4.000 uIU/mL 0.584 1.580  3.160   Free T4      0.71 - 1.51 ng/dL 1.29 1.22  0.97   HIV 1/2 Ag/Ab      Negative Negative          Current Outpatient Medications   Medication Sig Dispense Refill    amLODIPine (NORVASC) 5 MG tablet TAKE 1 TABLET(5 MG) BY MOUTH EVERY DAY 90 tablet 1    aspirin (ECOTRIN) 81 MG EC tablet Take  81 mg by mouth once daily.      levothyroxine (SYNTHROID) 112 MCG tablet TAKE 1 TABLET BY MOUTH DAILY BEFORE BREAKFAST 90 tablet 1    lisinopriL-hydrochlorothiazide (PRINZIDE,ZESTORETIC) 20-25 mg Tab TAKE 1 TABLET BY MOUTH EVERY DAY 90 tablet 1    metFORMIN (GLUCOPHAGE-XR) 500 MG XR 24hr tablet Take 1 tablet (500 mg total) by mouth daily with breakfast. 90 tablet 1    rosuvastatin (CRESTOR) 10 MG tablet TAKE 1 TABLET(10 MG) BY MOUTH EVERY DAY 90 tablet 1     No current facility-administered medications for this visit.        Past Medical History:   Diagnosis Date    H/O mammogram 3/31/2016    [facility]    Hyperlipidemia 2017    Hypertension     Hyperthyroidism     Hypothyroidism     IFG (impaired fasting glucose)     NAFLD (nonalcoholic fatty liver disease)     KELLY (obstructive sleep apnea)     on CPAP    KELLY on CPAP     Screening for colorectal cancer 2016    Type 2 diabetes mellitus without complication, without long-term current use of insulin 2019       Past Surgical History:   Procedure Laterality Date     SECTION      COLONOSCOPY N/A 2016    Procedure: COLONOSCOPY-Miralax split prep ;  Surgeon: Cali Oliveira Jr., MD;  Location: [facility] ENDO;  Service: Endoscopy;  Laterality: N/A;    DILATION AND CURETTAGE OF UTERUS         Family History   Problem Relation Age of Onset    Dementia Mother     Parkinsonism Mother         PASSED AGE 78    Hypertension Father     Cancer Father         KIDNEY PASSED AGE 70    Heart disease Father 50        HEART ATTACK    Diabetes Father     Hypertension Sister     Diabetes Brother     Hypertension Brother     Diabetes Sister     Hypertension Sister     Hypertension Sister     Hypertension Brother     No Known Problems Daughter     No Known Problems Son     No Known Problems Son        Social History     Socioeconomic History    Marital status:      Spouse name: Not on file    Number of children: Not on  file    Years of education: Not on file    Highest education level: Not on file   Occupational History    Not on file   Social Needs    Financial resource strain: Not on file    Food insecurity     Worry: Not on file     Inability: Not on file    Transportation needs     Medical: Not on file     Non-medical: Not on file   Tobacco Use    Smoking status: Never Smoker    Smokeless tobacco: Never Used   Substance and Sexual Activity    Alcohol use: Yes     Comment: rare    Drug use: No    Sexual activity: Not Currently   Lifestyle    Physical activity     Days per week: Not on file     Minutes per session: Not on file    Stress: Not on file   Relationships    Social connections     Talks on phone: Not on file     Gets together: Not on file     Attends Jew service: Not on file     Active member of club or organization: Not on file     Attends meetings of clubs or organizations: Not on file     Relationship status: Not on file   Other Topics Concern    Not on file   Social History Narrative    Not on file       Review of Systems   Constitutional: Negative for appetite change, chills, fatigue, fever and unexpected weight change.   HENT: Negative for congestion, ear pain, mouth sores, nosebleeds, postnasal drip, rhinorrhea, sinus pressure, sneezing, sore throat, trouble swallowing and voice change.    Eyes: Negative for photophobia, pain, discharge, redness, itching and visual disturbance.   Respiratory: Negative for cough, chest tightness and shortness of breath.    Cardiovascular: Negative for chest pain, palpitations and leg swelling.   Gastrointestinal: Negative for abdominal pain, blood in stool, constipation, diarrhea, nausea and vomiting.   Genitourinary: Negative for dysuria, frequency, hematuria and urgency.   Musculoskeletal: Negative for arthralgias, back pain, joint swelling and myalgias.   Skin: Negative for color change and rash.   Allergic/Immunologic: Negative for immunocompromised  state.   Neurological: Negative for dizziness, seizures, syncope, weakness and headaches.   Hematological: Negative for adenopathy. Does not bruise/bleed easily.   Psychiatric/Behavioral: Negative for agitation, dysphoric mood, sleep disturbance and suicidal ideas. The patient is not nervous/anxious.          Objective:     Vitals:    12/01/20 1411   BP: 110/70   BP Location: Left arm   Patient Position: Sitting   BP Method: Large (Manual)   Pulse: 85   Resp: 18   Temp: 97.4 °F (36.3 °C)   TempSrc: Temporal   SpO2: 98%   Weight: 92.6 kg (204 lb 2.3 oz)   Height: 5' (1.524 m)          Physical Exam  Constitutional:       General: She is not in acute distress.     Appearance: She is well-developed. She is obese. She is not ill-appearing, toxic-appearing or diaphoretic.      Comments: + obesity with Body mass index is 39.87 kg/m².   HENT:      Head: Normocephalic and atraumatic.      Right Ear: External ear normal.      Left Ear: External ear normal.   Eyes:      General: No scleral icterus.        Right eye: No discharge.         Left eye: No discharge.      Extraocular Movements: Extraocular movements intact.      Conjunctiva/sclera: Conjunctivae normal.   Neck:      Musculoskeletal: Normal range of motion and neck supple.      Thyroid: No thyromegaly.      Vascular: No JVD.      Trachea: No tracheal deviation.   Cardiovascular:      Rate and Rhythm: Normal rate and regular rhythm.      Pulses:           Dorsalis pedis pulses are 2+ on the left side.      Heart sounds: Normal heart sounds. No murmur.   Pulmonary:      Effort: Pulmonary effort is normal. No respiratory distress.      Breath sounds: Normal breath sounds. No stridor. No wheezing or rales.   Abdominal:      General: There is no distension.   Musculoskeletal: Normal range of motion.   Feet:      Right foot:      Protective Sensation: 7 sites tested. 7 sites sensed.      Skin integrity: No ulcer or skin breakdown.      Left foot:      Protective Sensation:  7 sites tested. 7 sites sensed.      Skin integrity: No ulcer or skin breakdown.   Lymphadenopathy:      Cervical: No cervical adenopathy.   Skin:     General: Skin is warm and dry.      Findings: No rash.   Neurological:      Mental Status: She is alert and oriented to person, place, and time.      Cranial Nerves: No cranial nerve deficit.      Coordination: Coordination normal.   Psychiatric:         Mood and Affect: Mood normal.         Behavior: Behavior normal.         Thought Content: Thought content normal.         Judgment: Judgment normal.           Assessment:         ICD-10-CM ICD-9-CM   1. Type 2 diabetes mellitus without complication, without long-term current use of insulin  E11.9 250.00   2. Hyperlipidemia associated with type 2 diabetes mellitus  E11.69 250.80    E78.5 272.4   3. Essential hypertension  I10 401.9   4. Acquired hypothyroidism  E03.9 244.9   5. NAFLD (nonalcoholic fatty liver disease)  K76.0 571.8   6. KELLY on CPAP  G47.33 327.23    Z99.89 V46.8   7. Class 2 severe obesity due to excess calories with serious comorbidity and body mass index (BMI) of 39.0 to 39.9 in adult  E66.01 278.01    Z68.39 V85.39   8. Screening for deficiency anemia  Z13.0 V78.1       Plan:       Type 2 diabetes mellitus without complication, without long-term current use of insulin  -  continue metformin at present dose and recheck in 6 months  -     Comprehensive Metabolic Panel; Future; Expected date: 12/01/2020  -     Hemoglobin A1C; Future; Expected date: 12/01/2020    Hyperlipidemia associated with type 2 diabetes mellitus  -  continue rosuvastatin at present dose and recheck in 6 months  -     Lipid Panel; Future; Expected date: 12/01/2020    Essential hypertension  -  continue current medicines and recheck in 6 months.  IF you continue to lose weight, please monitor blood pressure at home is feeling bed if you blood pressure is consistently less than 100/60, return to office for me to make medicine  adjustments.    Acquired hypothyroidism  -  continue current medicine and recheck in 6 months  -     TSH; Future; Expected date: 12/01/2020  -     T4, Free; Future; Expected date: 12/01/2020    NAFLD (nonalcoholic fatty liver disease)  -  to continue to work on dietary modifications    KELLY on CPAP  -  use CPAP nightly    Class 2 severe obesity due to excess calories with serious comorbidity and body mass index (BMI) of 39.0 to 39.9 in adult  -  seems to be doing well on this current diet.  Continue to work on lifestyle modifications.    Screening for deficiency anemia  -     CBC Auto Differential; Future; Expected date: 12/01/2020      Follow up in about 6 months (around 6/1/2021) for fasting labs and WELLNESS EXAM.     Patient's Medications   New Prescriptions    No medications on file   Previous Medications    AMLODIPINE (NORVASC) 5 MG TABLET    TAKE 1 TABLET(5 MG) BY MOUTH EVERY DAY    ASPIRIN (ECOTRIN) 81 MG EC TABLET    Take 81 mg by mouth once daily.    LEVOTHYROXINE (SYNTHROID) 112 MCG TABLET    TAKE 1 TABLET BY MOUTH DAILY BEFORE BREAKFAST    LISINOPRIL-HYDROCHLOROTHIAZIDE (PRINZIDE,ZESTORETIC) 20-25 MG TAB    TAKE 1 TABLET BY MOUTH EVERY DAY    METFORMIN (GLUCOPHAGE-XR) 500 MG XR 24HR TABLET    Take 1 tablet (500 mg total) by mouth daily with breakfast.    ROSUVASTATIN (CRESTOR) 10 MG TABLET    TAKE 1 TABLET(10 MG) BY MOUTH EVERY DAY   Modified Medications    No medications on file   Discontinued Medications    DICLOFENAC (VOLTAREN) 75 MG EC TABLET    Take 1 tablet (75 mg total) by mouth 2 (two) times daily.

## 2020-12-08 ENCOUNTER — PATIENT MESSAGE (OUTPATIENT)
Dept: FAMILY MEDICINE | Facility: CLINIC | Age: 60
End: 2020-12-08

## 2020-12-08 DIAGNOSIS — E11.9 TYPE 2 DIABETES MELLITUS WITHOUT COMPLICATION, WITHOUT LONG-TERM CURRENT USE OF INSULIN: ICD-10-CM

## 2020-12-08 RX ORDER — METFORMIN HYDROCHLORIDE 500 MG/1
500 TABLET, EXTENDED RELEASE ORAL
Qty: 90 TABLET | Refills: 1 | Status: SHIPPED | OUTPATIENT
Start: 2020-12-08 | End: 2021-06-01 | Stop reason: SDUPTHER

## 2021-01-06 ENCOUNTER — PATIENT MESSAGE (OUTPATIENT)
Dept: FAMILY MEDICINE | Facility: CLINIC | Age: 61
End: 2021-01-06

## 2021-01-06 DIAGNOSIS — I10 ESSENTIAL HYPERTENSION: ICD-10-CM

## 2021-01-06 DIAGNOSIS — E78.5 HYPERLIPIDEMIA, UNSPECIFIED HYPERLIPIDEMIA TYPE: ICD-10-CM

## 2021-01-06 DIAGNOSIS — E03.9 ACQUIRED HYPOTHYROIDISM: ICD-10-CM

## 2021-01-07 ENCOUNTER — PATIENT MESSAGE (OUTPATIENT)
Dept: FAMILY MEDICINE | Facility: CLINIC | Age: 61
End: 2021-01-07

## 2021-01-07 RX ORDER — AMLODIPINE BESYLATE 5 MG/1
5 TABLET ORAL DAILY
Qty: 90 TABLET | Refills: 1 | Status: SHIPPED | OUTPATIENT
Start: 2021-01-07 | End: 2021-06-01 | Stop reason: SDUPTHER

## 2021-01-07 RX ORDER — ROSUVASTATIN CALCIUM 10 MG/1
10 TABLET, COATED ORAL DAILY
Qty: 90 TABLET | Refills: 1 | Status: SHIPPED | OUTPATIENT
Start: 2021-01-07 | End: 2021-06-01 | Stop reason: SDUPTHER

## 2021-01-07 RX ORDER — LEVOTHYROXINE SODIUM 112 UG/1
TABLET ORAL
Qty: 90 TABLET | Refills: 1 | Status: SHIPPED | OUTPATIENT
Start: 2021-01-07 | End: 2021-06-01 | Stop reason: SDUPTHER

## 2021-01-07 RX ORDER — LISINOPRIL AND HYDROCHLOROTHIAZIDE 20; 25 MG/1; MG/1
1 TABLET ORAL DAILY
Qty: 90 TABLET | Refills: 1 | Status: SHIPPED | OUTPATIENT
Start: 2021-01-07 | End: 2021-06-01 | Stop reason: SDUPTHER

## 2021-02-18 ENCOUNTER — OFFICE VISIT (OUTPATIENT)
Dept: OBSTETRICS AND GYNECOLOGY | Facility: CLINIC | Age: 61
End: 2021-02-18
Payer: COMMERCIAL

## 2021-02-18 VITALS
SYSTOLIC BLOOD PRESSURE: 118 MMHG | DIASTOLIC BLOOD PRESSURE: 70 MMHG | WEIGHT: 198.88 LBS | BODY MASS INDEX: 38.84 KG/M2

## 2021-02-18 DIAGNOSIS — Z12.4 SCREENING FOR CERVICAL CANCER: ICD-10-CM

## 2021-02-18 DIAGNOSIS — Z01.419 WELL WOMAN EXAM WITH ROUTINE GYNECOLOGICAL EXAM: Primary | ICD-10-CM

## 2021-02-18 PROCEDURE — 99999 PR PBB SHADOW E&M-EST. PATIENT-LVL III: ICD-10-PCS | Mod: PBBFAC,,, | Performed by: OBSTETRICS & GYNECOLOGY

## 2021-02-18 PROCEDURE — 87624 HPV HI-RISK TYP POOLED RSLT: CPT

## 2021-02-18 PROCEDURE — 3078F PR MOST RECENT DIASTOLIC BLOOD PRESSURE < 80 MM HG: ICD-10-PCS | Mod: CPTII,S$GLB,, | Performed by: OBSTETRICS & GYNECOLOGY

## 2021-02-18 PROCEDURE — 99999 PR PBB SHADOW E&M-EST. PATIENT-LVL III: CPT | Mod: PBBFAC,,, | Performed by: OBSTETRICS & GYNECOLOGY

## 2021-02-18 PROCEDURE — 3008F PR BODY MASS INDEX (BMI) DOCUMENTED: ICD-10-PCS | Mod: CPTII,S$GLB,, | Performed by: OBSTETRICS & GYNECOLOGY

## 2021-02-18 PROCEDURE — 88175 CYTOPATH C/V AUTO FLUID REDO: CPT | Performed by: OBSTETRICS & GYNECOLOGY

## 2021-02-18 PROCEDURE — 99396 PR PREVENTIVE VISIT,EST,40-64: ICD-10-PCS | Mod: S$GLB,,, | Performed by: OBSTETRICS & GYNECOLOGY

## 2021-02-18 PROCEDURE — 3078F DIAST BP <80 MM HG: CPT | Mod: CPTII,S$GLB,, | Performed by: OBSTETRICS & GYNECOLOGY

## 2021-02-18 PROCEDURE — 99396 PREV VISIT EST AGE 40-64: CPT | Mod: S$GLB,,, | Performed by: OBSTETRICS & GYNECOLOGY

## 2021-02-18 PROCEDURE — 1126F AMNT PAIN NOTED NONE PRSNT: CPT | Mod: S$GLB,,, | Performed by: OBSTETRICS & GYNECOLOGY

## 2021-02-18 PROCEDURE — 3074F SYST BP LT 130 MM HG: CPT | Mod: CPTII,S$GLB,, | Performed by: OBSTETRICS & GYNECOLOGY

## 2021-02-18 PROCEDURE — 3008F BODY MASS INDEX DOCD: CPT | Mod: CPTII,S$GLB,, | Performed by: OBSTETRICS & GYNECOLOGY

## 2021-02-18 PROCEDURE — 1126F PR PAIN SEVERITY QUANTIFIED, NO PAIN PRESENT: ICD-10-PCS | Mod: S$GLB,,, | Performed by: OBSTETRICS & GYNECOLOGY

## 2021-02-18 PROCEDURE — 3074F PR MOST RECENT SYSTOLIC BLOOD PRESSURE < 130 MM HG: ICD-10-PCS | Mod: CPTII,S$GLB,, | Performed by: OBSTETRICS & GYNECOLOGY

## 2021-02-26 LAB
HPV HR 12 DNA SPEC QL NAA+PROBE: NEGATIVE
HPV16 AG SPEC QL: NEGATIVE
HPV18 DNA SPEC QL NAA+PROBE: NEGATIVE

## 2021-03-11 LAB
FINAL PATHOLOGIC DIAGNOSIS: NORMAL
Lab: NORMAL

## 2021-05-17 ENCOUNTER — PATIENT OUTREACH (OUTPATIENT)
Dept: ADMINISTRATIVE | Facility: HOSPITAL | Age: 61
End: 2021-05-17

## 2021-05-17 DIAGNOSIS — E11.9 TYPE 2 DIABETES MELLITUS WITHOUT COMPLICATION, WITHOUT LONG-TERM CURRENT USE OF INSULIN: ICD-10-CM

## 2021-06-01 ENCOUNTER — OFFICE VISIT (OUTPATIENT)
Dept: FAMILY MEDICINE | Facility: CLINIC | Age: 61
End: 2021-06-01
Payer: COMMERCIAL

## 2021-06-01 VITALS
SYSTOLIC BLOOD PRESSURE: 120 MMHG | WEIGHT: 201.25 LBS | HEART RATE: 68 BPM | TEMPERATURE: 98 F | HEIGHT: 60 IN | DIASTOLIC BLOOD PRESSURE: 70 MMHG | OXYGEN SATURATION: 98 % | BODY MASS INDEX: 39.51 KG/M2

## 2021-06-01 DIAGNOSIS — I10 ESSENTIAL HYPERTENSION: ICD-10-CM

## 2021-06-01 DIAGNOSIS — K76.0 NAFLD (NONALCOHOLIC FATTY LIVER DISEASE): ICD-10-CM

## 2021-06-01 DIAGNOSIS — E11.69 HYPERLIPIDEMIA ASSOCIATED WITH TYPE 2 DIABETES MELLITUS: ICD-10-CM

## 2021-06-01 DIAGNOSIS — E03.9 ACQUIRED HYPOTHYROIDISM: ICD-10-CM

## 2021-06-01 DIAGNOSIS — E78.5 HYPERLIPIDEMIA ASSOCIATED WITH TYPE 2 DIABETES MELLITUS: ICD-10-CM

## 2021-06-01 DIAGNOSIS — E66.01 CLASS 2 SEVERE OBESITY DUE TO EXCESS CALORIES WITH SERIOUS COMORBIDITY AND BODY MASS INDEX (BMI) OF 39.0 TO 39.9 IN ADULT: ICD-10-CM

## 2021-06-01 DIAGNOSIS — Z00.00 ANNUAL PHYSICAL EXAM: Primary | ICD-10-CM

## 2021-06-01 DIAGNOSIS — E11.9 TYPE 2 DIABETES MELLITUS WITHOUT COMPLICATION, WITHOUT LONG-TERM CURRENT USE OF INSULIN: ICD-10-CM

## 2021-06-01 DIAGNOSIS — G47.33 OSA ON CPAP: ICD-10-CM

## 2021-06-01 PROCEDURE — 99999 PR PBB SHADOW E&M-EST. PATIENT-LVL III: ICD-10-PCS | Mod: PBBFAC,,, | Performed by: NURSE PRACTITIONER

## 2021-06-01 PROCEDURE — 3008F PR BODY MASS INDEX (BMI) DOCUMENTED: ICD-10-PCS | Mod: CPTII,S$GLB,, | Performed by: NURSE PRACTITIONER

## 2021-06-01 PROCEDURE — 1126F AMNT PAIN NOTED NONE PRSNT: CPT | Mod: S$GLB,,, | Performed by: NURSE PRACTITIONER

## 2021-06-01 PROCEDURE — 1126F PR PAIN SEVERITY QUANTIFIED, NO PAIN PRESENT: ICD-10-PCS | Mod: S$GLB,,, | Performed by: NURSE PRACTITIONER

## 2021-06-01 PROCEDURE — 99999 PR PBB SHADOW E&M-EST. PATIENT-LVL III: CPT | Mod: PBBFAC,,, | Performed by: NURSE PRACTITIONER

## 2021-06-01 PROCEDURE — 99396 PR PREVENTIVE VISIT,EST,40-64: ICD-10-PCS | Mod: S$GLB,,, | Performed by: NURSE PRACTITIONER

## 2021-06-01 PROCEDURE — 3008F BODY MASS INDEX DOCD: CPT | Mod: CPTII,S$GLB,, | Performed by: NURSE PRACTITIONER

## 2021-06-01 PROCEDURE — 99396 PREV VISIT EST AGE 40-64: CPT | Mod: S$GLB,,, | Performed by: NURSE PRACTITIONER

## 2021-06-01 RX ORDER — LISINOPRIL AND HYDROCHLOROTHIAZIDE 20; 25 MG/1; MG/1
1 TABLET ORAL DAILY
Qty: 90 TABLET | Refills: 1 | Status: SHIPPED | OUTPATIENT
Start: 2021-06-01 | End: 2022-01-24

## 2021-06-01 RX ORDER — ROSUVASTATIN CALCIUM 10 MG/1
10 TABLET, COATED ORAL DAILY
Qty: 90 TABLET | Refills: 1 | Status: SHIPPED | OUTPATIENT
Start: 2021-06-01 | End: 2022-01-25 | Stop reason: SDUPTHER

## 2021-06-01 RX ORDER — METFORMIN HYDROCHLORIDE 500 MG/1
500 TABLET, EXTENDED RELEASE ORAL
Qty: 90 TABLET | Refills: 1 | Status: SHIPPED | OUTPATIENT
Start: 2021-06-01 | End: 2021-12-27 | Stop reason: SDUPTHER

## 2021-06-01 RX ORDER — LEVOTHYROXINE SODIUM 112 UG/1
TABLET ORAL
Qty: 90 TABLET | Refills: 1 | Status: SHIPPED | OUTPATIENT
Start: 2021-06-01 | End: 2022-01-24

## 2021-06-01 RX ORDER — AMLODIPINE BESYLATE 5 MG/1
5 TABLET ORAL DAILY
Qty: 90 TABLET | Refills: 1 | Status: SHIPPED | OUTPATIENT
Start: 2021-06-01 | End: 2022-01-24

## 2021-06-07 ENCOUNTER — TELEPHONE (OUTPATIENT)
Dept: GASTROENTEROLOGY | Facility: CLINIC | Age: 61
End: 2021-06-07

## 2021-10-25 ENCOUNTER — IMMUNIZATION (OUTPATIENT)
Dept: PHARMACY | Facility: CLINIC | Age: 61
End: 2021-10-25
Payer: COMMERCIAL

## 2021-12-10 ENCOUNTER — OFFICE VISIT (OUTPATIENT)
Dept: FAMILY MEDICINE | Facility: CLINIC | Age: 61
End: 2021-12-10
Payer: COMMERCIAL

## 2021-12-10 VITALS
TEMPERATURE: 98 F | SYSTOLIC BLOOD PRESSURE: 116 MMHG | BODY MASS INDEX: 42.07 KG/M2 | WEIGHT: 214.31 LBS | DIASTOLIC BLOOD PRESSURE: 70 MMHG | HEART RATE: 81 BPM | OXYGEN SATURATION: 98 % | RESPIRATION RATE: 18 BRPM | HEIGHT: 60 IN

## 2021-12-10 DIAGNOSIS — I15.2 HYPERTENSION ASSOCIATED WITH TYPE 2 DIABETES MELLITUS: ICD-10-CM

## 2021-12-10 DIAGNOSIS — Z00.00 ENCOUNTER FOR BLOOD TEST FOR ROUTINE GENERAL PHYSICAL EXAMINATION: ICD-10-CM

## 2021-12-10 DIAGNOSIS — E11.69 HYPERLIPIDEMIA ASSOCIATED WITH TYPE 2 DIABETES MELLITUS: ICD-10-CM

## 2021-12-10 DIAGNOSIS — E03.9 ACQUIRED HYPOTHYROIDISM: ICD-10-CM

## 2021-12-10 DIAGNOSIS — K76.0 NAFLD (NONALCOHOLIC FATTY LIVER DISEASE): ICD-10-CM

## 2021-12-10 DIAGNOSIS — E11.59 HYPERTENSION ASSOCIATED WITH TYPE 2 DIABETES MELLITUS: ICD-10-CM

## 2021-12-10 DIAGNOSIS — E78.5 HYPERLIPIDEMIA ASSOCIATED WITH TYPE 2 DIABETES MELLITUS: ICD-10-CM

## 2021-12-10 DIAGNOSIS — Z12.31 ENCOUNTER FOR SCREENING MAMMOGRAM FOR MALIGNANT NEOPLASM OF BREAST: ICD-10-CM

## 2021-12-10 DIAGNOSIS — G47.33 OSA ON CPAP: ICD-10-CM

## 2021-12-10 DIAGNOSIS — E11.9 TYPE 2 DIABETES MELLITUS WITHOUT COMPLICATION, WITHOUT LONG-TERM CURRENT USE OF INSULIN: Primary | ICD-10-CM

## 2021-12-10 PROCEDURE — 3061F NEG MICROALBUMINURIA REV: CPT | Mod: CPTII,S$GLB,, | Performed by: NURSE PRACTITIONER

## 2021-12-10 PROCEDURE — 99999 PR PBB SHADOW E&M-EST. PATIENT-LVL IV: ICD-10-PCS | Mod: PBBFAC,,, | Performed by: NURSE PRACTITIONER

## 2021-12-10 PROCEDURE — 99214 OFFICE O/P EST MOD 30 MIN: CPT | Mod: S$GLB,,, | Performed by: NURSE PRACTITIONER

## 2021-12-10 PROCEDURE — 3066F PR DOCUMENTATION OF TREATMENT FOR NEPHROPATHY: ICD-10-PCS | Mod: CPTII,S$GLB,, | Performed by: NURSE PRACTITIONER

## 2021-12-10 PROCEDURE — 4010F PR ACE/ARB THEARPY RXD/TAKEN: ICD-10-PCS | Mod: CPTII,S$GLB,, | Performed by: NURSE PRACTITIONER

## 2021-12-10 PROCEDURE — 4010F ACE/ARB THERAPY RXD/TAKEN: CPT | Mod: CPTII,S$GLB,, | Performed by: NURSE PRACTITIONER

## 2021-12-10 PROCEDURE — 3066F NEPHROPATHY DOC TX: CPT | Mod: CPTII,S$GLB,, | Performed by: NURSE PRACTITIONER

## 2021-12-10 PROCEDURE — 99214 PR OFFICE/OUTPT VISIT, EST, LEVL IV, 30-39 MIN: ICD-10-PCS | Mod: S$GLB,,, | Performed by: NURSE PRACTITIONER

## 2021-12-10 PROCEDURE — 99999 PR PBB SHADOW E&M-EST. PATIENT-LVL IV: CPT | Mod: PBBFAC,,, | Performed by: NURSE PRACTITIONER

## 2021-12-10 PROCEDURE — 3061F PR NEG MICROALBUMINURIA RESULT DOCUMENTED/REVIEW: ICD-10-PCS | Mod: CPTII,S$GLB,, | Performed by: NURSE PRACTITIONER

## 2021-12-26 ENCOUNTER — PATIENT MESSAGE (OUTPATIENT)
Dept: FAMILY MEDICINE | Facility: CLINIC | Age: 61
End: 2021-12-26
Payer: COMMERCIAL

## 2021-12-26 DIAGNOSIS — E11.9 TYPE 2 DIABETES MELLITUS WITHOUT COMPLICATION, WITHOUT LONG-TERM CURRENT USE OF INSULIN: ICD-10-CM

## 2021-12-27 RX ORDER — METFORMIN HYDROCHLORIDE 500 MG/1
500 TABLET, EXTENDED RELEASE ORAL
Qty: 90 TABLET | Refills: 1 | Status: SHIPPED | OUTPATIENT
Start: 2021-12-27 | End: 2022-06-10 | Stop reason: SDUPTHER

## 2022-01-19 ENCOUNTER — PATIENT OUTREACH (OUTPATIENT)
Dept: ADMINISTRATIVE | Facility: HOSPITAL | Age: 62
End: 2022-01-19
Payer: COMMERCIAL

## 2022-01-19 ENCOUNTER — TELEPHONE (OUTPATIENT)
Dept: ADMINISTRATIVE | Facility: HOSPITAL | Age: 62
End: 2022-01-19
Payer: COMMERCIAL

## 2022-01-22 ENCOUNTER — PATIENT MESSAGE (OUTPATIENT)
Dept: FAMILY MEDICINE | Facility: CLINIC | Age: 62
End: 2022-01-22
Payer: COMMERCIAL

## 2022-01-22 DIAGNOSIS — E11.69 HYPERLIPIDEMIA ASSOCIATED WITH TYPE 2 DIABETES MELLITUS: ICD-10-CM

## 2022-01-22 DIAGNOSIS — E78.5 HYPERLIPIDEMIA ASSOCIATED WITH TYPE 2 DIABETES MELLITUS: ICD-10-CM

## 2022-01-25 RX ORDER — ROSUVASTATIN CALCIUM 10 MG/1
10 TABLET, COATED ORAL DAILY
Qty: 90 TABLET | Refills: 1 | Status: SHIPPED | OUTPATIENT
Start: 2022-01-25 | End: 2022-07-27

## 2022-01-25 NOTE — TELEPHONE ENCOUNTER
Pt requesting refill for rosuvastatin. Last refill on 06/01/2021 for 90 day supply with 1 refill. Please advise.

## 2022-05-31 ENCOUNTER — DOCUMENTATION ONLY (OUTPATIENT)
Dept: ADMINISTRATIVE | Facility: HOSPITAL | Age: 62
End: 2022-05-31
Payer: COMMERCIAL

## 2022-05-31 ENCOUNTER — PATIENT MESSAGE (OUTPATIENT)
Dept: ADMINISTRATIVE | Facility: HOSPITAL | Age: 62
End: 2022-05-31
Payer: COMMERCIAL

## 2022-05-31 DIAGNOSIS — E11.9 TYPE 2 DIABETES MELLITUS WITHOUT COMPLICATION, WITHOUT LONG-TERM CURRENT USE OF INSULIN: Primary | ICD-10-CM

## 2022-06-09 ENCOUNTER — TELEPHONE (OUTPATIENT)
Dept: OBSTETRICS AND GYNECOLOGY | Facility: CLINIC | Age: 62
End: 2022-06-09
Payer: COMMERCIAL

## 2022-06-09 NOTE — TELEPHONE ENCOUNTER
Returned pts call to reschedule appt that was cancelled on today.  ----- Message from Jerica Davis sent at 6/9/2022 10:25 AM CDT -----  Regarding: call back  Contact: [phone]  Type:  Patient Returning Call    Who Called: PT   Who Left Message for Patient: Nurse   Does the patient know what this is regarding?: yes   Would the patient rather a call back or a response via [facility]? Call back   Best Call Back Number: [phone]  Additional Information:

## 2022-06-10 ENCOUNTER — OFFICE VISIT (OUTPATIENT)
Dept: FAMILY MEDICINE | Facility: CLINIC | Age: 62
End: 2022-06-10
Payer: COMMERCIAL

## 2022-06-10 VITALS
SYSTOLIC BLOOD PRESSURE: 124 MMHG | BODY MASS INDEX: 39.3 KG/M2 | DIASTOLIC BLOOD PRESSURE: 76 MMHG | RESPIRATION RATE: 18 BRPM | OXYGEN SATURATION: 98 % | WEIGHT: 208.13 LBS | TEMPERATURE: 98 F | HEIGHT: 61 IN | HEART RATE: 79 BPM

## 2022-06-10 DIAGNOSIS — E11.69 HYPERLIPIDEMIA ASSOCIATED WITH TYPE 2 DIABETES MELLITUS: ICD-10-CM

## 2022-06-10 DIAGNOSIS — K76.0 NAFLD (NONALCOHOLIC FATTY LIVER DISEASE): ICD-10-CM

## 2022-06-10 DIAGNOSIS — M25.471 RIGHT ANKLE SWELLING: ICD-10-CM

## 2022-06-10 DIAGNOSIS — I15.2 HYPERTENSION ASSOCIATED WITH TYPE 2 DIABETES MELLITUS: ICD-10-CM

## 2022-06-10 DIAGNOSIS — E11.59 HYPERTENSION ASSOCIATED WITH TYPE 2 DIABETES MELLITUS: ICD-10-CM

## 2022-06-10 DIAGNOSIS — E78.5 HYPERLIPIDEMIA ASSOCIATED WITH TYPE 2 DIABETES MELLITUS: ICD-10-CM

## 2022-06-10 DIAGNOSIS — R26.9 GAIT ABNORMALITY: ICD-10-CM

## 2022-06-10 DIAGNOSIS — E66.01 CLASS 2 SEVERE OBESITY DUE TO EXCESS CALORIES WITH SERIOUS COMORBIDITY AND BODY MASS INDEX (BMI) OF 39.0 TO 39.9 IN ADULT: ICD-10-CM

## 2022-06-10 DIAGNOSIS — E11.9 TYPE 2 DIABETES MELLITUS WITHOUT COMPLICATION, WITHOUT LONG-TERM CURRENT USE OF INSULIN: ICD-10-CM

## 2022-06-10 DIAGNOSIS — E03.9 ACQUIRED HYPOTHYROIDISM: ICD-10-CM

## 2022-06-10 DIAGNOSIS — G47.33 OSA ON CPAP: ICD-10-CM

## 2022-06-10 DIAGNOSIS — Z00.00 ANNUAL PHYSICAL EXAM: Primary | ICD-10-CM

## 2022-06-10 PROCEDURE — 3008F PR BODY MASS INDEX (BMI) DOCUMENTED: ICD-10-PCS | Mod: CPTII,S$GLB,, | Performed by: NURSE PRACTITIONER

## 2022-06-10 PROCEDURE — 3074F PR MOST RECENT SYSTOLIC BLOOD PRESSURE < 130 MM HG: ICD-10-PCS | Mod: CPTII,S$GLB,, | Performed by: NURSE PRACTITIONER

## 2022-06-10 PROCEDURE — 99396 PR PREVENTIVE VISIT,EST,40-64: ICD-10-PCS | Mod: S$GLB,,, | Performed by: NURSE PRACTITIONER

## 2022-06-10 PROCEDURE — 4010F PR ACE/ARB THEARPY RXD/TAKEN: ICD-10-PCS | Mod: CPTII,S$GLB,, | Performed by: NURSE PRACTITIONER

## 2022-06-10 PROCEDURE — 3061F PR NEG MICROALBUMINURIA RESULT DOCUMENTED/REVIEW: ICD-10-PCS | Mod: CPTII,S$GLB,, | Performed by: NURSE PRACTITIONER

## 2022-06-10 PROCEDURE — 3066F PR DOCUMENTATION OF TREATMENT FOR NEPHROPATHY: ICD-10-PCS | Mod: CPTII,S$GLB,, | Performed by: NURSE PRACTITIONER

## 2022-06-10 PROCEDURE — 3061F NEG MICROALBUMINURIA REV: CPT | Mod: CPTII,S$GLB,, | Performed by: NURSE PRACTITIONER

## 2022-06-10 PROCEDURE — 99999 PR PBB SHADOW E&M-EST. PATIENT-LVL IV: ICD-10-PCS | Mod: PBBFAC,,, | Performed by: NURSE PRACTITIONER

## 2022-06-10 PROCEDURE — 99396 PREV VISIT EST AGE 40-64: CPT | Mod: S$GLB,,, | Performed by: NURSE PRACTITIONER

## 2022-06-10 PROCEDURE — 4010F ACE/ARB THERAPY RXD/TAKEN: CPT | Mod: CPTII,S$GLB,, | Performed by: NURSE PRACTITIONER

## 2022-06-10 PROCEDURE — 99999 PR PBB SHADOW E&M-EST. PATIENT-LVL IV: CPT | Mod: PBBFAC,,, | Performed by: NURSE PRACTITIONER

## 2022-06-10 PROCEDURE — 1159F MED LIST DOCD IN RCRD: CPT | Mod: CPTII,S$GLB,, | Performed by: NURSE PRACTITIONER

## 2022-06-10 PROCEDURE — 3074F SYST BP LT 130 MM HG: CPT | Mod: CPTII,S$GLB,, | Performed by: NURSE PRACTITIONER

## 2022-06-10 PROCEDURE — 3066F NEPHROPATHY DOC TX: CPT | Mod: CPTII,S$GLB,, | Performed by: NURSE PRACTITIONER

## 2022-06-10 PROCEDURE — 1160F RVW MEDS BY RX/DR IN RCRD: CPT | Mod: CPTII,S$GLB,, | Performed by: NURSE PRACTITIONER

## 2022-06-10 PROCEDURE — 3044F HG A1C LEVEL LT 7.0%: CPT | Mod: CPTII,S$GLB,, | Performed by: NURSE PRACTITIONER

## 2022-06-10 PROCEDURE — 3078F PR MOST RECENT DIASTOLIC BLOOD PRESSURE < 80 MM HG: ICD-10-PCS | Mod: CPTII,S$GLB,, | Performed by: NURSE PRACTITIONER

## 2022-06-10 PROCEDURE — 3008F BODY MASS INDEX DOCD: CPT | Mod: CPTII,S$GLB,, | Performed by: NURSE PRACTITIONER

## 2022-06-10 PROCEDURE — 3044F PR MOST RECENT HEMOGLOBIN A1C LEVEL <7.0%: ICD-10-PCS | Mod: CPTII,S$GLB,, | Performed by: NURSE PRACTITIONER

## 2022-06-10 PROCEDURE — 1160F PR REVIEW ALL MEDS BY PRESCRIBER/CLIN PHARMACIST DOCUMENTED: ICD-10-PCS | Mod: CPTII,S$GLB,, | Performed by: NURSE PRACTITIONER

## 2022-06-10 PROCEDURE — 3078F DIAST BP <80 MM HG: CPT | Mod: CPTII,S$GLB,, | Performed by: NURSE PRACTITIONER

## 2022-06-10 PROCEDURE — 1159F PR MEDICATION LIST DOCUMENTED IN MEDICAL RECORD: ICD-10-PCS | Mod: CPTII,S$GLB,, | Performed by: NURSE PRACTITIONER

## 2022-06-10 RX ORDER — METFORMIN HYDROCHLORIDE 500 MG/1
1000 TABLET, EXTENDED RELEASE ORAL
Qty: 180 TABLET | Refills: 1 | Status: SHIPPED | OUTPATIENT
Start: 2022-06-10 | End: 2022-12-27 | Stop reason: SDUPTHER

## 2022-06-10 NOTE — PROGRESS NOTES
"Subjective:       Patient ID: Digna Bhatia is a 61 y.o. female.    Chief Complaint: Annual Exam and gait change  (Right foot turns inward when she walks)    HPI    Patient is a 61-year-old white female with Type 2 Diabetes definitively diagnosed in August 2019 but Prediabetic since October 2018, hypertension, hyperlipidemia, hypothyroidism, fatty liver disease noted on ultrasound May 2017 with elevated liver enzymes, and obstructive sleep apnea with CPAP use that is here today for ANNUAL PHYSICAL EXAM with fasting lab results. Patient also has complaint of right foot turning inward when she walks and chronic right outer ankle swelling - will address this complaint in same note as wellness exam.     TYPE 2 DIABETES  Currently taking  Metformin  mg daily   FBG is now 168 with HgbA1C 6.6%.   INCREASE Metformin XR to 1000 mg daily and recheck in 6 months  Diabetic foot exam done today  Diabetic eye exam scheduled for 7/12/22.     Hyperlipidemia   controlled on Rosuvastatin 10 mg daily.   LDL 64.8    Hypertension  Currently controlled on present medications, amlodipine 5 mg daily and lisinopril HCT 20/25 mg daily.  /76 (BP Location: Left arm, Patient Position: Sitting, BP Method: Large (Manual))   Pulse 79   Temp 98.4 °F (36.9 °C) (Temporal)   Resp 18   Ht 5' 1" (1.549 m)   Wt 94.4 kg (208 lb 1.8 oz)   LMP  (LMP Unknown)   SpO2 98%   BMI 39.32 kg/m²      Hypothyroidism   Controlled on present medication, Synthroid 112 µg daily with TSH 0.914 and free T4 1.11.     Fatty Liver Disease/Gilbert's syndrome  Patient has always had an elevated total bilirubin in the past but her other liver enzymes started to climb in May 2017.  Patient was sent to GI for evaluation.  She has had a negative hepatitis panel and a liver ultrasound in May 2017 showed FATTY LIVER DISEASE.    Patient's  liver enzymes have returned to normal range since 2017 other than chronically high bilirubin 1.8     Obstructive sleep apnea "   Noncompliant with CPAP machine use - state machine was part of recall and never had fixed.    Gait Abnormality with Chronic Right Ankle Swelling  Patient reports she started noting that her right foot turns INWARD when she walks and has started having more pain to the foot.  She has CHRONIC Right outer ankle swelling for multiple years that states occurs after an injury years ago.  Will refer to podiatry to further evaluate the gait abnormality.      Wellness labs:  CMP with ; kidney and liver function good; bili 1.8 stable - Gilbert's syndrome  Cholesterol well controlled  A1C 6.6%  Thyroid controlled on present medication    Health Maintenance:  Eye exam scheduled for 7/12/22  GYN EXAM scheduled for 6/30/22  OVERDUE FOR COLONSCOPY since July 2021.  Had the colonoscopy scheduled but cancelled due to COVID and was never rescheduled - she is ready to schedule now.  Sent message to Christine Marcial, the  for Dr. Kong to schedule procedure.    Component      Latest Ref Rng & Units 6/6/2022 12/3/2021 5/28/2021   Sodium      136 - 145 mmol/L 138 138 143   Potassium      3.5 - 5.1 mmol/L 4.1 4.2 4.8   Chloride      95 - 110 mmol/L 102 102 103   CO2      23 - 29 mmol/L 27 26 31 (H)   Glucose      70 - 110 mg/dL 168 (H) 131 (H) 135 (H)   BUN      7 - 17 mg/dL 19 (H) 26 (H) 22 (H)   Creatinine      0.50 - 1.40 mg/dL 0.84 0.89 0.82   Calcium      8.7 - 10.5 mg/dL 9.3 9.7 10.3   PROTEIN TOTAL      6.0 - 8.4 g/dL 7.3 7.5 7.7   Albumin      3.5 - 5.2 g/dL 4.5 4.9 4.7   BILIRUBIN TOTAL      0.1 - 1.0 mg/dL 1.8 (H) 1.8 (H) 1.3 (H)   Alkaline Phosphatase      38 - 126 U/L 84 78 80   AST      15 - 46 U/L 28 22 19   ALT      10 - 44 U/L 29 23 17   Anion Gap      8 - 16 mmol/L 9 10 9   eGFR if African American      >60 mL/min/1.73 m:2 >60.0 >60.0 >60.0   eGFR if non African American      >60 mL/min/1.73 m:2 >60.0 >60.0 >60.0   Cholesterol      120 - 199 mg/dL 139 172 145   Triglycerides      30 - 150 mg/dL 111 94 79    HDL      40 - 75 mg/dL 52 60 55   LDL Cholesterol External      63.0 - 159.0 mg/dL 64.8 93.2 74.2   HDL/Cholesterol Ratio      20.0 - 50.0 % 37.4 34.9 37.9   Total Cholesterol/HDL Ratio      2.0 - 5.0 2.7 2.9 2.6   Non-HDL Cholesterol      mg/dL 87 112 90   Hemoglobin A1C External      4.0 - 5.6 % 6.6 (H) 6.5 (H) 5.5   Estimated Avg Glucose      68 - 131 mg/dL 143 (H) 140 (H) 111   TSH      0.400 - 4.000 uIU/mL 0.914 3.790 0.836   Free T4      0.71 - 1.51 ng/dL 1.11 1.07 1.32     Component      Latest Ref Rng & Units 6/6/2022 5/28/2021   Microalbumin, Urine      ug/mL 7.0 <2.5   Creatinine, Urine      15.0 - 325.0 mg/dL 143.0 83.0   MICROALB/CREAT RATIO      0.0 - 30.0 ug/mg 4.9 Unable to calculate     Review of Systems   Constitutional: Negative for appetite change, chills, fatigue, fever and unexpected weight change.   HENT: Negative for congestion, ear pain, mouth sores, nosebleeds, postnasal drip, rhinorrhea, sinus pressure, sneezing, sore throat, trouble swallowing and voice change.    Eyes: Negative for photophobia, pain, discharge, redness, itching and visual disturbance.   Respiratory: Negative for cough, chest tightness and shortness of breath.    Cardiovascular: Negative for chest pain, palpitations and leg swelling.   Gastrointestinal: Negative for abdominal pain, blood in stool, constipation, diarrhea, nausea and vomiting.   Genitourinary: Negative for dysuria, frequency, hematuria and urgency.   Musculoskeletal: Positive for gait problem and joint swelling. Negative for arthralgias, back pain and myalgias.   Skin: Negative for color change and rash.   Allergic/Immunologic: Negative for immunocompromised state.   Neurological: Negative for dizziness, seizures, syncope, weakness and headaches.   Hematological: Negative for adenopathy. Does not bruise/bleed easily.   Psychiatric/Behavioral: Negative for agitation, dysphoric mood, sleep disturbance and suicidal ideas. The patient is not nervous/anxious.   "        Objective:     Vitals:    06/10/22 1147   BP: 124/76   BP Location: Left arm   Patient Position: Sitting   BP Method: Large (Manual)   Pulse: 79   Resp: 18   Temp: 98.4 °F (36.9 °C)   TempSrc: Temporal   SpO2: 98%   Weight: 94.4 kg (208 lb 1.8 oz)   Height: 5' 1" (1.549 m)          Physical Exam  Constitutional:       General: She is not in acute distress.     Appearance: She is well-developed. She is obese. She is not ill-appearing, toxic-appearing or diaphoretic.      Comments: + obesity with Body mass index is 39.31 kg/m².     HENT:      Head: Normocephalic and atraumatic.      Right Ear: External ear normal.      Left Ear: External ear normal.   Eyes:      General: No scleral icterus.        Right eye: No discharge.         Left eye: No discharge.      Extraocular Movements: Extraocular movements intact.      Conjunctiva/sclera: Conjunctivae normal.   Neck:      Thyroid: No thyromegaly.      Vascular: No JVD.      Trachea: No tracheal deviation.   Cardiovascular:      Rate and Rhythm: Normal rate and regular rhythm.      Pulses:           Dorsalis pedis pulses are 2+ on the left side.      Heart sounds: Normal heart sounds. No murmur heard.  Pulmonary:      Effort: Pulmonary effort is normal. No respiratory distress.      Breath sounds: Normal breath sounds. No stridor. No wheezing or rales.   Abdominal:      General: There is no distension.   Musculoskeletal:         General: Swelling present.      Cervical back: Normal range of motion and neck supple.      Right ankle: Swelling present.        Legs:       Comments: Chronic right lower ankle swelling   Feet:      Right foot:      Protective Sensation: 7 sites tested. 7 sites sensed.      Skin integrity: No ulcer or skin breakdown.      Left foot:      Protective Sensation: 7 sites tested. 7 sites sensed.      Skin integrity: No ulcer or skin breakdown.   Lymphadenopathy:      Cervical: No cervical adenopathy.   Skin:     General: Skin is warm and dry. "      Findings: No rash.   Neurological:      Mental Status: She is alert and oriented to person, place, and time.      Cranial Nerves: No cranial nerve deficit.      Coordination: Coordination normal.   Psychiatric:         Mood and Affect: Mood normal.         Behavior: Behavior normal.         Thought Content: Thought content normal.         Judgment: Judgment normal.           Assessment:         ICD-10-CM ICD-9-CM   1. Annual physical exam  Z00.00 V70.0   2. Type 2 diabetes mellitus without complication, without long-term current use of insulin  E11.9 250.00   3. Hyperlipidemia associated with type 2 diabetes mellitus  E11.69 250.80    E78.5 272.4   4. Hypertension associated with type 2 diabetes mellitus  E11.59 250.80    I15.2 401.9   5. Acquired hypothyroidism  E03.9 244.9   6. NAFLD (nonalcoholic fatty liver disease)  K76.0 571.8   7. KELLY on CPAP  G47.33 327.23    Z99.89 V46.8   8. Class 2 severe obesity due to excess calories with serious comorbidity and body mass index (BMI) of 39.0 to 39.9 in adult  E66.01 278.01    Z68.39 V85.39   9. Right ankle swelling  M25.471 719.07   10. Gait abnormality  R26.9 781.2       Plan:       Annual physical exam    Health Maintenance Summary     Full History      Expand All  Collapse All    Postponed - Eye Exam - scheduled for July  (Yearly)  Postponed until 7/18/2022  10/27/2020   DIABETES EYE EXAM    06/18/2019  SmartData: WORKFLOW - HEALTHY PLANET - EXTERNAL DATA - EXTERNAL PROCEDURES DATE - EYE EXAM      Postponed - Colorectal Cancer Screening - sent message to [facility] staff to schedule.  (Colonoscopy - Every 5 Years)  Postponed until 6/10/2023  07/29/2016  Colonoscopy      Postponed - COVID-19 Vaccine  (3 - Booster for Moderna series)  Postponed until 6/10/2023  04/21/2021  Imm Admin: COVID-19, MRNA, LN-S, PF (MODERNA FULL 0.5 ML DOSE)    03/24/2021  Imm Admin: COVID-19, MRNA, LN-S, PF (MODERNA FULL 0.5 ML DOSE)      Ordered - Hemoglobin A1c  (Every 6  Months)  Ordered on 6/11/2022 06/06/2022  Hemoglobin A1C External component of Hemoglobin A1C    12/03/2021  Hemoglobin A1C External component of Hemoglobin A1C    05/28/2021  Hemoglobin A1C External component of Hemoglobin A1C    11/24/2020  Hemoglobin A1C External component of Hemoglobin A1C    05/21/2020  Hemoglobin A1C External component of Hemoglobin A1c    View More History     Mammogram  (Yearly)  Next due on 1/14/2023 01/14/2022  [facility]    10/16/2020  [facility]    09/12/2019  [facility]    08/20/2019  [facility]    07/12/2018  Done - [facility]-- Please see media    View More History     Diabetes Urine Screening  (Yearly)  Next due on 6/6/2023 06/06/2022  MICROALB/CREAT RATIO component of Microalbumin/Creatinine Ratio, Urine    05/28/2021  MICROALB/CREAT RATIO component of Microalbumin/creatinine urine ratio      Ordered - Lipid Panel  (Yearly)  Ordered on 6/11/2022 06/06/2022  Lipid Panel    12/03/2021  Lipid Panel    05/28/2021  Lipid Panel    11/24/2020  Lipid Panel    05/21/2020  Lipid panel    View More History     Low Dose Statin  (Yearly)  Next due on 6/10/2023  06/10/2022  Registry Metric: Last Current Statin Reviewed Date    01/25/2022  Registry Metric: Last Current Statin Order Date      Ordered - Foot Exam  (Yearly)  Ordered on 6/10/2022  06/10/2022  Done    06/01/2021  SmartData: WORKFLOW - DIABETES - DIABETIC FOOT EXAM PERFORMED    12/01/2020  SmartData: WORKFLOW - DIABETES - DIABETIC FOOT EXAM PERFORMED    12/01/2020  Done    12/10/2019  SmartData: WORKFLOW - DIABETES - DIABETIC FOOT EXAM PERFORMED    View More History     Cervical Cancer Screening  (HPV/Cotest - Every 5 Years)  Next due on 2/18/2026 02/18/2021  Multiple components of HPV High Risk Genotypes, PCR    02/18/2021  Liquid-Based Pap Smear, Screening    08/23/2018  Liquid-based pap smear, screening    03/02/2017  SmartData: EXTERNAL LAB DATE - HPV TESTING    03/02/2017  HPV DNA probe, amplified    View More History      TETANUS VACCINE  (Every 10 Years)  Next due on 7/26/2026 07/26/2016  Imm Admin: Tdap      Hepatitis C Screening  Completed  05/05/2017  Hepatitis panel, acute    07/21/2016  Hepatitis C antibody      Pneumococcal Vaccines (Age 0-64)  (Series Information)  Aged Out  08/09/2019  Imm Admin: Pneumococcal Polysaccharide - 23 Valent      Shingles Vaccine  (Series Information)  Completed  03/10/2020  Imm Admin: Zoster Recombinant    11/22/2019  Imm Admin: Zoster Recombinant      HIV Screening  Completed  11/24/2020  HIV 1/2 Ag/Ab (4th Gen)      Influenza Vaccine  (Series Information)  Completed  10/22/2021  Imm Admin: Influenza - Quadrivalent - PF *Preferred* (6 months and older)    09/11/2020  Imm Admin: Influenza - Quadrivalent - PF *Preferred* (6 months and older)    10/14/2019  Imm Admin: Influenza - Quadrivalent - PF *Preferred* (6 months and older)    10/11/2018  Imm Admin: Influenza - Quadrivalent - PF *Preferred* (6 months and older)    10/06/2017  Imm Admin: Influenza - Quadrivalent - PF *Preferred* (6 months and older)          Type 2 diabetes mellitus without complication, without long-term current use of insulin  INCREASE Metformin to 2 tablets daily with breakfast and recheck in 6 months.  -     metFORMIN (GLUCOPHAGE-XR) 500 MG ER 24hr tablet; Take 2 tablets (1,000 mg total) by mouth daily with breakfast.  Dispense: 180 tablet; Refill: 1  -     Comprehensive Metabolic Panel; Future; Expected date: 12/01/2022  -     Hemoglobin A1C; Future; Expected date: 12/01/2022    Hyperlipidemia associated with type 2 diabetes mellitus  Continue current medication(s).  Follow up in 6 months.  -     Lipid Panel; Future; Expected date: 12/01/2022    Hypertension associated with type 2 diabetes mellitus  Continue current medication(s).  Follow up in 6 months.    Acquired hypothyroidism  Continue current medication(s).  Follow up in 6 months.  -     TSH; Future; Expected date: 12/01/2022  -     T4, Free; Future; Expected  date: 12/01/2022    NAFLD (nonalcoholic fatty liver disease)  Work on weight loss    KELLY on CPAP  Complete CPAP recall    Class 2 severe obesity due to excess calories with serious comorbidity and body mass index (BMI) of 39.0 to 39.9 in adult    Right ankle swelling  Referral to Podiatry for evaluation  -     Ambulatory referral/consult to Podiatry; Future; Expected date: 06/17/2022    Gait abnormality  -     Ambulatory referral/consult to Podiatry; Future; Expected date: 06/17/2022      Follow up in about 6 months (around 12/10/2022) for fasting labs and follow up.     Patient's Medications   New Prescriptions    No medications on file   Previous Medications    AMLODIPINE (NORVASC) 5 MG TABLET    TAKE 1 TABLET(5 MG) BY MOUTH EVERY DAY    ASPIRIN (ECOTRIN) 81 MG EC TABLET    Take 81 mg by mouth once daily.    LEVOTHYROXINE (SYNTHROID) 112 MCG TABLET    TAKE 1 TABLET BY MOUTH DAILY BEFORE BREAKFAST    LISINOPRIL-HYDROCHLOROTHIAZIDE (PRINZIDE,ZESTORETIC) 20-25 MG TAB    TAKE 1 TABLET BY MOUTH EVERY DAY    ROSUVASTATIN (CRESTOR) 10 MG TABLET    Take 1 tablet (10 mg total) by mouth once daily.   Modified Medications    Modified Medication Previous Medication    METFORMIN (GLUCOPHAGE-XR) 500 MG ER 24HR TABLET metFORMIN (GLUCOPHAGE-XR) 500 MG ER 24hr tablet       Take 2 tablets (1,000 mg total) by mouth daily with breakfast.    Take 1 tablet (500 mg total) by mouth daily with breakfast.   Discontinued Medications    No medications on file       Past Medical History:   Diagnosis Date    H/O mammogram 3/31/2016    has done at [facility]    Hyperlipidemia 9/12/2017    Hypertension     Hyperthyroidism     Hypothyroidism     IFG (impaired fasting glucose)     NAFLD (nonalcoholic fatty liver disease)     KELLY (obstructive sleep apnea)     on CPAP    KELLY on CPAP     Screening for colorectal cancer 7/29/2016    Type 2 diabetes mellitus without complication, without long-term current use of insulin 8/9/2019       Past Surgical  History:   Procedure Laterality Date     SECTION      COLONOSCOPY N/A 2016    Procedure: COLONOSCOPY-Miralax split prep ;  Surgeon: Cali Oliveira Jr., MD;  Location: [facility];  Service: Endoscopy;  Laterality: N/A;    DILATION AND CURETTAGE OF UTERUS         Family History   Problem Relation Age of Onset    Dementia Mother     Parkinsonism Mother         PASSED AGE 78    Hypertension Father     Cancer Father         KIDNEY PASSED AGE 70    Heart disease Father 50        HEART ATTACK    Diabetes Father     Hypertension Sister     Diabetes Brother     Hypertension Brother     Diabetes Sister     Hypertension Sister     Hypertension Sister     Hypertension Brother     No Known Problems Daughter     No Known Problems Son     No Known Problems Son        Social History     Socioeconomic History    Marital status:    Tobacco Use    Smoking status: Former Smoker     Packs/day: 0.10     Years: 8.00     Pack years: 0.80     Types: Cigarettes     Quit date:      Years since quittin.4    Smokeless tobacco: Never Used    Tobacco comment: socially only for around 20 years but not every day   Substance and Sexual Activity    Alcohol use: Yes     Comment: rare    Drug use: No    Sexual activity: Not Currently     Birth control/protection: Post-menopausal

## 2022-06-16 ENCOUNTER — OFFICE VISIT (OUTPATIENT)
Dept: PODIATRY | Facility: CLINIC | Age: 62
End: 2022-06-16
Payer: COMMERCIAL

## 2022-06-16 VITALS
HEART RATE: 88 BPM | SYSTOLIC BLOOD PRESSURE: 113 MMHG | BODY MASS INDEX: 41.14 KG/M2 | WEIGHT: 217.88 LBS | HEIGHT: 61 IN | DIASTOLIC BLOOD PRESSURE: 76 MMHG

## 2022-06-16 DIAGNOSIS — D17.23 LIPOMA OF RIGHT LOWER EXTREMITY: Primary | ICD-10-CM

## 2022-06-16 DIAGNOSIS — E66.01 MORBID OBESITY: ICD-10-CM

## 2022-06-16 DIAGNOSIS — R26.9 GAIT ABNORMALITY: ICD-10-CM

## 2022-06-16 DIAGNOSIS — M25.471 RIGHT ANKLE SWELLING: ICD-10-CM

## 2022-06-16 PROCEDURE — 99203 OFFICE O/P NEW LOW 30 MIN: CPT | Mod: S$GLB,,, | Performed by: PODIATRIST

## 2022-06-16 PROCEDURE — 3008F PR BODY MASS INDEX (BMI) DOCUMENTED: ICD-10-PCS | Mod: CPTII,S$GLB,, | Performed by: PODIATRIST

## 2022-06-16 PROCEDURE — 3074F PR MOST RECENT SYSTOLIC BLOOD PRESSURE < 130 MM HG: ICD-10-PCS | Mod: CPTII,S$GLB,, | Performed by: PODIATRIST

## 2022-06-16 PROCEDURE — 3066F NEPHROPATHY DOC TX: CPT | Mod: CPTII,S$GLB,, | Performed by: PODIATRIST

## 2022-06-16 PROCEDURE — 3078F DIAST BP <80 MM HG: CPT | Mod: CPTII,S$GLB,, | Performed by: PODIATRIST

## 2022-06-16 PROCEDURE — 3061F PR NEG MICROALBUMINURIA RESULT DOCUMENTED/REVIEW: ICD-10-PCS | Mod: CPTII,S$GLB,, | Performed by: PODIATRIST

## 2022-06-16 PROCEDURE — 1159F PR MEDICATION LIST DOCUMENTED IN MEDICAL RECORD: ICD-10-PCS | Mod: CPTII,S$GLB,, | Performed by: PODIATRIST

## 2022-06-16 PROCEDURE — 3074F SYST BP LT 130 MM HG: CPT | Mod: CPTII,S$GLB,, | Performed by: PODIATRIST

## 2022-06-16 PROCEDURE — 99999 PR PBB SHADOW E&M-EST. PATIENT-LVL III: ICD-10-PCS | Mod: PBBFAC,,, | Performed by: PODIATRIST

## 2022-06-16 PROCEDURE — 3008F BODY MASS INDEX DOCD: CPT | Mod: CPTII,S$GLB,, | Performed by: PODIATRIST

## 2022-06-16 PROCEDURE — 3078F PR MOST RECENT DIASTOLIC BLOOD PRESSURE < 80 MM HG: ICD-10-PCS | Mod: CPTII,S$GLB,, | Performed by: PODIATRIST

## 2022-06-16 PROCEDURE — 3044F PR MOST RECENT HEMOGLOBIN A1C LEVEL <7.0%: ICD-10-PCS | Mod: CPTII,S$GLB,, | Performed by: PODIATRIST

## 2022-06-16 PROCEDURE — 4010F PR ACE/ARB THEARPY RXD/TAKEN: ICD-10-PCS | Mod: CPTII,S$GLB,, | Performed by: PODIATRIST

## 2022-06-16 PROCEDURE — 1160F PR REVIEW ALL MEDS BY PRESCRIBER/CLIN PHARMACIST DOCUMENTED: ICD-10-PCS | Mod: CPTII,S$GLB,, | Performed by: PODIATRIST

## 2022-06-16 PROCEDURE — 99203 PR OFFICE/OUTPT VISIT, NEW, LEVL III, 30-44 MIN: ICD-10-PCS | Mod: S$GLB,,, | Performed by: PODIATRIST

## 2022-06-16 PROCEDURE — 1160F RVW MEDS BY RX/DR IN RCRD: CPT | Mod: CPTII,S$GLB,, | Performed by: PODIATRIST

## 2022-06-16 PROCEDURE — 3044F HG A1C LEVEL LT 7.0%: CPT | Mod: CPTII,S$GLB,, | Performed by: PODIATRIST

## 2022-06-16 PROCEDURE — 4010F ACE/ARB THERAPY RXD/TAKEN: CPT | Mod: CPTII,S$GLB,, | Performed by: PODIATRIST

## 2022-06-16 PROCEDURE — 3066F PR DOCUMENTATION OF TREATMENT FOR NEPHROPATHY: ICD-10-PCS | Mod: CPTII,S$GLB,, | Performed by: PODIATRIST

## 2022-06-16 PROCEDURE — 1159F MED LIST DOCD IN RCRD: CPT | Mod: CPTII,S$GLB,, | Performed by: PODIATRIST

## 2022-06-16 PROCEDURE — 99999 PR PBB SHADOW E&M-EST. PATIENT-LVL III: CPT | Mod: PBBFAC,,, | Performed by: PODIATRIST

## 2022-06-16 PROCEDURE — 3061F NEG MICROALBUMINURIA REV: CPT | Mod: CPTII,S$GLB,, | Performed by: PODIATRIST

## 2022-06-16 NOTE — PROGRESS NOTES
Subjective:      Patient ID: Digna Bhatia is a 61 y.o. female.    Chief Complaint: R ankle swelling.       61 y.o. female presenting with Right ankle swelling laterally. Denies acute injury on R ankle. Swelling comes and goes. Has been dealing with swelling on and off for several months. Notices mild tenderness when ankle swells up. Controlled DM. No previous treatments for ankle swelling.       Hemoglobin A1C   Date Value Ref Range Status   06/06/2022 6.6 (H) 4.0 - 5.6 % Final     Comment:     ADA Screening Guidelines:  5.7-6.4%  Consistent with prediabetes  >or=6.5%  Consistent with diabetes    High levels of fetal hemoglobin interfere with the HbA1C  assay. Heterozygous hemoglobin variants (HbS, HgC, etc)do  not significantly interfere with this assay.   However, presence of multiple variants may affect accuracy.     12/03/2021 6.5 (H) 4.0 - 5.6 % Final     Comment:     ADA Screening Guidelines:  5.7-6.4%  Consistent with prediabetes  >or=6.5%  Consistent with diabetes    High levels of fetal hemoglobin interfere with the HbA1C  assay. Heterozygous hemoglobin variants (HbS, HgC, etc)do  not significantly interfere with this assay.   However, presence of multiple variants may affect accuracy.     05/28/2021 5.5 4.0 - 5.6 % Final     Comment:     ADA Screening Guidelines:  5.7-6.4%  Consistent with prediabetes  >or=6.5%  Consistent with diabetes    High levels of fetal hemoglobin interfere with the HbA1C  assay. Heterozygous hemoglobin variants (HbS, HgC, etc)do  not significantly interfere with this assay.   However, presence of multiple variants may affect accuracy.         Review of Systems   Constitutional: Negative for chills, decreased appetite, fever and malaise/fatigue.   HENT: Negative for congestion, ear discharge and sore throat.    Eyes: Negative for discharge and pain.   Cardiovascular: Negative for chest pain, claudication and leg swelling.   Respiratory: Negative for cough and shortness of  breath.    Skin: Negative for color change, nail changes and rash.   Musculoskeletal: Positive for joint swelling. Negative for arthritis, joint pain and muscle weakness.   Gastrointestinal: Negative for bloating, abdominal pain, diarrhea, nausea and vomiting.   Genitourinary: Negative for flank pain and hematuria.   Neurological: Negative for headaches, numbness and weakness.   Psychiatric/Behavioral: Negative for altered mental status.             Past Medical History:   Diagnosis Date    H/O [facility]ogram 3/31/2016    [facility]    Hyperlipidemia 2017    Hypertension     Hyperthyroidism     Hypothyroidism     IFG (impaired fasting glucose)     NAFLD (nonalcoholic fatty liver disease)     KELLY (obstructive sleep apnea)     on CPAP    KELLY on CPAP     Screening for colorectal cancer 2016    Type 2 diabetes mellitus without complication, without long-term current use of insulin 2019       Past Surgical History:   Procedure Laterality Date     SECTION      COLONOSCOPY N/A 2016    Procedure: COLONOSCOPY-Miralax split prep ;  Surgeon: Cali Oliveira Jr., MD;  Location: [facility];  Service: Endoscopy;  Laterality: N/A;    DILATION AND CURETTAGE OF UTERUS         Family History   Problem Relation Age of Onset    Dementia Mother     Parkinsonism Mother         PASSED AGE 78    Hypertension Father     Cancer Father         KIDNEY PASSED AGE 70    Heart disease Father 50        HEART ATTACK    Diabetes Father     Hypertension Sister     Diabetes Brother     Hypertension Brother     Diabetes Sister     Hypertension Sister     Hypertension Sister     Hypertension Brother     No Known Problems Daughter     No Known Problems Son     No Known Problems Son        Social History     Socioeconomic History    Marital status:    Tobacco Use    Smoking status: Former Smoker     Packs/day: 0.10     Years: 8.00     Pack years: 0.80     Types: Cigarettes     Quit date:  "1988     Years since quittin.4    Smokeless tobacco: Never Used    Tobacco comment: socially only for around 20 years but not every day   Substance and Sexual Activity    Alcohol use: Yes     Comment: rare    Drug use: No    Sexual activity: Not Currently     Birth control/protection: Post-menopausal       Current Outpatient Medications   Medication Sig Dispense Refill    amLODIPine (NORVASC) 5 MG tablet TAKE 1 TABLET(5 MG) BY MOUTH EVERY DAY 90 tablet 1    aspirin (ECOTRIN) 81 MG EC tablet Take 81 mg by mouth once daily.      levothyroxine (SYNTHROID) 112 MCG tablet TAKE 1 TABLET BY MOUTH DAILY BEFORE BREAKFAST 90 tablet 1    lisinopriL-hydrochlorothiazide (PRINZIDE,ZESTORETIC) 20-25 mg Tab TAKE 1 TABLET BY MOUTH EVERY DAY 90 tablet 1    metFORMIN (GLUCOPHAGE-XR) 500 MG ER 24hr tablet Take 2 tablets (1,000 mg total) by mouth daily with breakfast. 180 tablet 1    rosuvastatin (CRESTOR) 10 MG tablet Take 1 tablet (10 mg total) by mouth once daily. 90 tablet 1     No current facility-administered medications for this visit.       Review of patient's allergies indicates:   Allergen Reactions    Bactrim [sulfamethoxazole-trimethoprim] Rash    Pcn [penicillins] Rash       Vitals:    22 0955   BP: 113/76   Pulse: 88   Weight: 98.8 kg (217 lb 14.4 oz)   Height: 5' 1" (1.549 m)   PainSc: 0-No pain       Objective:      Physical Exam  Constitutional:       General: She is not in acute distress.     Appearance: She is well-developed.   HENT:      Nose: Nose normal.   Eyes:      Conjunctiva/sclera: Conjunctivae normal.   Pulmonary:      Effort: Pulmonary effort is normal.   Chest:      Chest wall: No tenderness.   Abdominal:      Tenderness: There is no abdominal tenderness.   Musculoskeletal:      Cervical back: Normal range of motion.   Neurological:      Mental Status: She is alert and oriented to person, place, and time.   Psychiatric:         Behavior: Behavior normal.         Vascular: Distal " DP/PT pulses palpable 2/4. CRT < 3 sec to tips of toes. No vericosities noted to LEs. Hair growth present LE, warm to touch LE.  RLE: mild edema noted to lateral ankle.     Dermatologic: No open lesions, lacerations or wounds. Interdigital spaces clean, dry and intact. No erythema, rubor, calor noted LE    Musculoskeletal: MMT 5/5 in DF/PF/Inv/Ev resistance with no reproduction of pain in any direction. Passive range of motion of ankle and pedal joints is painless. No calf tenderness LE, Compartments soft/compressible.    Neurological: Light touch, proprioception, and sharp/dull sensation are all intact. Protective threshold with the Mauston-Wienstein monofilament is intact. Vibratory sensation intact.         Assessment:       Encounter Diagnoses   Name Primary?    Lipoma of right lower extremity Yes    Right ankle swelling     Gait abnormality     Morbid obesity          Plan:       Digna was seen today for foot problem.    Diagnoses and all orders for this visit:    Lipoma of right lower extremity    Right ankle swelling  -     Ambulatory referral/consult to Podiatry    Gait abnormality  -     Ambulatory referral/consult to Podiatry    Morbid obesity      I counseled the patient on her conditions, their implications and medical management.    61 y.o. female with R lateral ankle swelling. Lipoma?     -STM noted lateral anterior aspect of ankle. Most likely lipoma.  Discussed different treatment options.  We discussed both conservative and surgical options. Compression stocking as needed. Recommend surgical excision of STM if pain gets worse/size of STM gets bigger.    -I reviewed imaging, clinical history, and diagnosis as above with the patient. I attempted to use layman's terms to educate the patient as well as utilize foot models and/or pictures. I personally went through imaging with the patient.    -The nature of the condition, options for management, as well as potential risks and complications were  discussed in detail with patient. Patient was amenable to my recommendations and left my office fully informed and will follow up as instructed or sooner if necessary.    -Discussed reducing caloric intake, increase physical activity, weight loss, exercise, low salt diet, which may include blood sugar control to help with foot and ankle complications.  -f/u prn     Note dictated with voice recognition software, please excuse any grammatical errors.

## 2022-06-17 ENCOUNTER — PATIENT MESSAGE (OUTPATIENT)
Dept: GASTROENTEROLOGY | Facility: CLINIC | Age: 62
End: 2022-06-17
Payer: COMMERCIAL

## 2022-06-17 ENCOUNTER — TELEPHONE (OUTPATIENT)
Dept: GASTROENTEROLOGY | Facility: CLINIC | Age: 62
End: 2022-06-17
Payer: COMMERCIAL

## 2022-06-17 NOTE — TELEPHONE ENCOUNTER
----- Message from Kymberly Barba NP sent at 6/10/2022 12:03 PM CDT -----  Regarding: Colonoscopy  Patient is now ready to schedule her colonoscopy - she had one scheduled in 7/2021 that was cancelled due to COVID and she is now ready to schedule.    Can you please contact her to schedule.  She is in my office now and knows to answer phone today.    APRIL Traylor

## 2022-06-17 NOTE — TELEPHONE ENCOUNTER
Referring Physician: Kymberly Barba NP                             Date: 6/17/2022    Reason for Referral: Personal history of colon polyps      Family History of:   Colon polyp: No  Relationship/Age of Onset:       Colon cancer: No  Relationship/Age of Onset:       Patient with:   Hemoccults Done:       Iron deficient:   No      On Blood Thinner: No      Valvular heart disease/valve replacement: No      Anemia Present: No      On NSAID: No    On Adipex or phentermine: No      Lung disease: No      Kidney disease: No      Hx of polyps: Yes      Hx of colon cancer: No      Previous colon evalations: Yes  When:   Where:   Pertinent symptoms:           Review of patient's allergies indicates: Penicillin and Bactrim        Patient was scheduled for colonoscopy on 8/5/2022       with Dr. Kong at [facility].       instructions were reviewed with patient.         Prep sent to [facility] in [place]    SUPREP Instructions    You are scheduled for a colonoscopy with Dr. Kong on 8/5/2022 at [facility]. Enter through the [facility] Entrance and check in at Same Day Surgery.  To ensure that your test is accurate and complete, you MUST follow these instructions listed below.  If you have any questions, please call our office at [phone].  Plan on being at the hospital for your procedure for 3-4 hours.    1.  Follow a CLEAR LIQUID DIET for the entire day before your scheduled colonoscopy.  This means no solid food the entire day starting when you wake.  You may have as much of the clear liquids as you want throughout the day.   CLEAR LIQUID DIET:   - Avoid Red, Orange, Purple, and/or Blue food coloring   - NO DAIRY   - You can have:  Coffee with sugar (no creamer), tea, water, soda, apple or white grape juice, chicken or beef broth/bouillon (no meat, noodles, or veggies), green/yellow popsicles, green/yellow Jell-O, lemonade.    2.  AT 5 pm the evening before your colonoscopy, POUR ONE (1) BOTTLE OF SUPREP INTO  THE MIXING CONTAINER, PROVIDED INSIDE THE BOX.  ADD WATER TO THE LINE ON THE CONTAINER AND MIX IT WELL.  DRINK THE ENTIRE CONTAINER AND THEN DRINK TWO (2) MORE CONTAINERS OF WATER OVER THE NEXT 1 HOUR.  This is sometimes easier to drink if this solution is cold, so you can mix the solution 20 minutes ahead of time and place in the refrigerator prior to drinking.  You have to drink the solution within 30-45 minutes of mixing it.  Do NOT put this solution over ice.  It IS ok to drink with a straw.    3.  The endoscopy department will call you 1 day before your colonoscopy to tell you the exact time to arrive, AND to tell you the exact time to drink the 2nd portion of your prep (which will be FIVE HOURS BEFORE YOUR ARRIVAL TIME).  At this time given to you, POUR ONE (1) BOTTLE OF SUPREP INTO THE MIXING CONTAINER, PROVIDED INSIDE THE BOX.  ADD WATER TO THE LINE ON THE CONTAINER AND MIX IT WELL.  DRINK THE ENTIRE CONTAINER AND THEN DRINK TWO (2) MORE CONTAINERS OF WATER OVER THE NEXT 1 HOUR.  This is sometimes easier to drink if this solution is cold, so you can mix the solution 20 minutes ahead of time and place in the refrigerator prior to drinking.  You have to drink the solution within 30-45 minutes of mixing it.  Do NOT put this solution over ice.  It IS ok to drink with a straw.  Once this is complete, you may not have ANYTHING else by mouth!    4.  You must have someone with you to DRIVE YOU HOME since you will be receiving IV sedation for the colonoscopy.    5.  It is ok to take MOST of your REGULAR MEDICATIONS  in the morning of your test with a SIP of water.  THE ONLY MEDS YOU NEED TO HOLD ARE YOUR DIABETES MEDICATIONS,  SOME BLOOD PRESSURE MEDS, AND BLOOD THINNERS IF OK'D BY YOUR DOCTOR.  Do NOT have anything else to eat or drink the morning of your colonoscopy.  It is ok to brush your teeth.    6.  If you are on blood thinners THAT YOU HAVE BEEN INSTRUCTED TO HOLD BY YOUR DOCTOR FOR THIS PROCEDURE, then do  NOT take this the morning of your colonoscopy.  Do NOT stop these medications on your own, they must be approved to be held by your doctor.  Your colonoscopy can NOT be done if you are on these medications.  Examples of blood thinners include: Coumadin, Aggrenox, Plavix, Pradaxa, Reapro, Pletal, Xarelto, Ticagrelor, Brilinta, Eliquis, and high dose aspirin (325 mg).  You do not have to stop baby aspirin 81 mg.    7.  IF YOU ARE DIABETIC:  NO INSULIN OR ORAL MEDICATIONS THE MORNING OF THE COLONOSCOPY.  TAKE ONLY HALF THE DOSE OF YOUR INSULIN THE DAY BEFORE THE COLONOSCOPY.  DO NOT TAKE ANY ORAL DIABETIC MEDICATIONS THE DAY BEFORE THE COLONOSCOPY.  IF YOU ARE AN INSULIN DEPENDENT DIABETIC WITH UNSTABLE BLOOD SUGARS, NOTIFY YOUR PRIMARY CARE PHYSICIAN FOR INSTRUCTIONS.

## 2022-06-17 NOTE — TELEPHONE ENCOUNTER
----- Message from Layne Marsh sent at 6/17/2022  4:26 PM CDT -----  Type:  Patient Returning Call    Who Called:pt  Who Left Message for Patient:Maryann  Does the patient know what this is regarding?: sched colonoscopy  Would the patient rather a call back or a response via [facility]? call  Best Call Back Number:[phone] (M)   Additional Information:

## 2022-06-20 RX ORDER — SODIUM, POTASSIUM,MAG SULFATES 17.5-3.13G
1 SOLUTION, RECONSTITUTED, ORAL ORAL DAILY
Qty: 1 KIT | Refills: 0 | Status: SHIPPED | OUTPATIENT
Start: 2022-06-20 | End: 2022-06-22

## 2022-06-30 ENCOUNTER — OFFICE VISIT (OUTPATIENT)
Dept: OBSTETRICS AND GYNECOLOGY | Facility: CLINIC | Age: 62
End: 2022-06-30
Payer: COMMERCIAL

## 2022-06-30 VITALS
DIASTOLIC BLOOD PRESSURE: 76 MMHG | BODY MASS INDEX: 41.07 KG/M2 | WEIGHT: 217.38 LBS | SYSTOLIC BLOOD PRESSURE: 137 MMHG

## 2022-06-30 DIAGNOSIS — Z12.31 ENCOUNTER FOR SCREENING MAMMOGRAM FOR BREAST CANCER: ICD-10-CM

## 2022-06-30 DIAGNOSIS — Z01.419 WELL WOMAN EXAM WITH ROUTINE GYNECOLOGICAL EXAM: Primary | ICD-10-CM

## 2022-06-30 DIAGNOSIS — Z12.4 SCREENING FOR CERVICAL CANCER: ICD-10-CM

## 2022-06-30 PROCEDURE — 88175 CYTOPATH C/V AUTO FLUID REDO: CPT | Performed by: OBSTETRICS & GYNECOLOGY

## 2022-06-30 PROCEDURE — 99999 PR PBB SHADOW E&M-EST. PATIENT-LVL III: ICD-10-PCS | Mod: PBBFAC,,, | Performed by: OBSTETRICS & GYNECOLOGY

## 2022-06-30 PROCEDURE — 3075F PR MOST RECENT SYSTOLIC BLOOD PRESS GE 130-139MM HG: ICD-10-PCS | Mod: CPTII,S$GLB,, | Performed by: OBSTETRICS & GYNECOLOGY

## 2022-06-30 PROCEDURE — 3078F PR MOST RECENT DIASTOLIC BLOOD PRESSURE < 80 MM HG: ICD-10-PCS | Mod: CPTII,S$GLB,, | Performed by: OBSTETRICS & GYNECOLOGY

## 2022-06-30 PROCEDURE — 3044F PR MOST RECENT HEMOGLOBIN A1C LEVEL <7.0%: ICD-10-PCS | Mod: CPTII,S$GLB,, | Performed by: OBSTETRICS & GYNECOLOGY

## 2022-06-30 PROCEDURE — 4010F PR ACE/ARB THEARPY RXD/TAKEN: ICD-10-PCS | Mod: CPTII,S$GLB,, | Performed by: OBSTETRICS & GYNECOLOGY

## 2022-06-30 PROCEDURE — 3066F PR DOCUMENTATION OF TREATMENT FOR NEPHROPATHY: ICD-10-PCS | Mod: CPTII,S$GLB,, | Performed by: OBSTETRICS & GYNECOLOGY

## 2022-06-30 PROCEDURE — 99396 PREV VISIT EST AGE 40-64: CPT | Mod: S$GLB,,, | Performed by: OBSTETRICS & GYNECOLOGY

## 2022-06-30 PROCEDURE — 3061F PR NEG MICROALBUMINURIA RESULT DOCUMENTED/REVIEW: ICD-10-PCS | Mod: CPTII,S$GLB,, | Performed by: OBSTETRICS & GYNECOLOGY

## 2022-06-30 PROCEDURE — 99999 PR PBB SHADOW E&M-EST. PATIENT-LVL III: CPT | Mod: PBBFAC,,, | Performed by: OBSTETRICS & GYNECOLOGY

## 2022-06-30 PROCEDURE — 3075F SYST BP GE 130 - 139MM HG: CPT | Mod: CPTII,S$GLB,, | Performed by: OBSTETRICS & GYNECOLOGY

## 2022-06-30 PROCEDURE — 3008F PR BODY MASS INDEX (BMI) DOCUMENTED: ICD-10-PCS | Mod: CPTII,S$GLB,, | Performed by: OBSTETRICS & GYNECOLOGY

## 2022-06-30 PROCEDURE — 1160F PR REVIEW ALL MEDS BY PRESCRIBER/CLIN PHARMACIST DOCUMENTED: ICD-10-PCS | Mod: CPTII,S$GLB,, | Performed by: OBSTETRICS & GYNECOLOGY

## 2022-06-30 PROCEDURE — 1160F RVW MEDS BY RX/DR IN RCRD: CPT | Mod: CPTII,S$GLB,, | Performed by: OBSTETRICS & GYNECOLOGY

## 2022-06-30 PROCEDURE — 3008F BODY MASS INDEX DOCD: CPT | Mod: CPTII,S$GLB,, | Performed by: OBSTETRICS & GYNECOLOGY

## 2022-06-30 PROCEDURE — 1159F PR MEDICATION LIST DOCUMENTED IN MEDICAL RECORD: ICD-10-PCS | Mod: CPTII,S$GLB,, | Performed by: OBSTETRICS & GYNECOLOGY

## 2022-06-30 PROCEDURE — 3044F HG A1C LEVEL LT 7.0%: CPT | Mod: CPTII,S$GLB,, | Performed by: OBSTETRICS & GYNECOLOGY

## 2022-06-30 PROCEDURE — 3066F NEPHROPATHY DOC TX: CPT | Mod: CPTII,S$GLB,, | Performed by: OBSTETRICS & GYNECOLOGY

## 2022-06-30 PROCEDURE — 1159F MED LIST DOCD IN RCRD: CPT | Mod: CPTII,S$GLB,, | Performed by: OBSTETRICS & GYNECOLOGY

## 2022-06-30 PROCEDURE — 4010F ACE/ARB THERAPY RXD/TAKEN: CPT | Mod: CPTII,S$GLB,, | Performed by: OBSTETRICS & GYNECOLOGY

## 2022-06-30 PROCEDURE — 3078F DIAST BP <80 MM HG: CPT | Mod: CPTII,S$GLB,, | Performed by: OBSTETRICS & GYNECOLOGY

## 2022-06-30 PROCEDURE — 99396 PR PREVENTIVE VISIT,EST,40-64: ICD-10-PCS | Mod: S$GLB,,, | Performed by: OBSTETRICS & GYNECOLOGY

## 2022-06-30 PROCEDURE — 3061F NEG MICROALBUMINURIA REV: CPT | Mod: CPTII,S$GLB,, | Performed by: OBSTETRICS & GYNECOLOGY

## 2022-06-30 NOTE — PROGRESS NOTES
GYNECOLOGY OFFICE NOTE    Reason for visit: annual    HPI: Pt is a 61 y.o.  female  who presents for annual. Menarche: 12. Menopausal since age 49. Denies postmenopausal bleeding or pelvic pain. She is not sexually active.She denies vaginal discharge.  Last pap: 2021, denies hx of abnormal. Last MMG 2022- negative.     Past Medical History:   Diagnosis Date    H/O mammogram 3/31/2016    [facility]    Hyperlipidemia 2017    Hypertension     Hyperthyroidism     Hypothyroidism     IFG (impaired fasting glucose)     NAFLD (nonalcoholic fatty liver disease)     KELLY (obstructive sleep apnea)     on CPAP    KELLY on CPAP     Screening for colorectal cancer 2016    Type 2 diabetes mellitus without complication, without long-term current use of insulin 2019       Past Surgical History:   Procedure Laterality Date     SECTION      COLONOSCOPY N/A 2016    Procedure: COLONOSCOPY-Miralax split prep ;  Surgeon: Cali Oliveira Jr., MD;  Location: [facility];  Service: Endoscopy;  Laterality: N/A;    DILATION AND CURETTAGE OF UTERUS         Family History   Problem Relation Age of Onset    Dementia Mother     Parkinsonism Mother         PASSED AGE 78    Hypertension Father     Cancer Father         KIDNEY PASSED AGE 70    Heart disease Father 50        HEART ATTACK    Diabetes Father     Hypertension Sister     Diabetes Brother     Hypertension Brother     Diabetes Sister     Hypertension Sister     Hypertension Sister     Hypertension Brother     No Known Problems Daughter     No Known Problems Son     No Known Problems Son     Breast cancer Neg Hx     Colon cancer Neg Hx     Ovarian cancer Neg Hx        Social History     Tobacco Use    Smoking status: Former Smoker     Packs/day: 0.10     Years: 8.00     Pack years: 0.80     Types: Cigarettes     Quit date:      Years since quittin.5    Smokeless tobacco: Never Used    Tobacco  comment: socially only for around 20 years but not every day   Substance Use Topics    Alcohol use: Yes     Comment: rare    Drug use: No       OB History    Para Term  AB Living   6 3 1 2 3 3   SAB IAB Ectopic Multiple Live Births   3       3      # Outcome Date GA Lbr Gerard/2nd Weight Sex Delivery Anes PTL Lv   6 SAB            5 SAB            4 SAB            3 Term      CS-Unspec   LEANDER   2       CS-Unspec  Y LEANDER   1       CS-Unspec  Y LEANDER       Current Outpatient Medications   Medication Sig    amLODIPine (NORVASC) 5 MG tablet TAKE 1 TABLET(5 MG) BY MOUTH EVERY DAY    aspirin (ECOTRIN) 81 MG EC tablet Take 81 mg by mouth once daily.    levothyroxine (SYNTHROID) 112 MCG tablet TAKE 1 TABLET BY MOUTH DAILY BEFORE BREAKFAST    lisinopriL-hydrochlorothiazide (PRINZIDE,ZESTORETIC) 20-25 mg Tab TAKE 1 TABLET BY MOUTH EVERY DAY    metFORMIN (GLUCOPHAGE-XR) 500 MG ER 24hr tablet Take 2 tablets (1,000 mg total) by mouth daily with breakfast.    rosuvastatin (CRESTOR) 10 MG tablet Take 1 tablet (10 mg total) by mouth once daily.     No current facility-administered medications for this visit.       Allergies: Bactrim [sulfamethoxazole-trimethoprim] and Pcn [penicillins]     /76   Wt 98.6 kg (217 lb 6 oz)   LMP  (LMP Unknown)   BMI 41.07 kg/m²     ROS:  GENERAL: Denies fever or chills.   SKIN: Denies rash or lesions.   HEAD: Denies head injury or headache.   CHEST: Denies chest pain or shortness of breath.   CARDIOVASCULAR: Denies palpitations or chest pain.   ABDOMEN: No constipation, diarrhea, nausea, vomiting or rectal bleeding.   URINARY: No dysuria, hematuria, or burning on urination.  REPRODUCTIVE: See HPI.   BREASTS: see HPI  NEUROLOGIC: Denies syncope or weakness.     Physical Exam:  GENERAL: alert, appears stated age and cooperative  NEUROLOGIC: orientated to person, place and time, normal mood and affect   CHEST: Normal respiratory effort  NECK: normal appearance  SKIN:  no acne, hirsutism  BREAST EXAM: breasts appear normal, no suspicious masses, no skin or nipple changes or axillary nodes  ABDOMEN: abdomen is soft without significant tenderness, masses  EXTERNAL GENITALIA:  normal general appearance  URETHRA: normal urethra, normal urethral meatus  VAGINA:  normal mucosa, no  lesions  CERVIX:  Normal  UTERUS:  Limited by habitus  ADNEXA: nontender    Diagnosis:  1. Well woman exam with routine gynecological exam    2. Screening for cervical cancer    3. Encounter for screening mammogram for breast cancer        Plan:   1. Annual  2. Pap  3. MMG ordered    Orders Placed This Encounter    Mammo Digital Screening Bilat w/ Mesfin    Liquid-Based Pap Smear, Screening           Nikky Araiza MD  OB/GYN

## 2022-07-12 LAB
LEFT EYE DM RETINOPATHY: NEGATIVE
RIGHT EYE DM RETINOPATHY: NEGATIVE

## 2022-07-13 ENCOUNTER — PATIENT OUTREACH (OUTPATIENT)
Dept: ADMINISTRATIVE | Facility: HOSPITAL | Age: 62
End: 2022-07-13
Payer: COMMERCIAL

## 2022-12-09 ENCOUNTER — OFFICE VISIT (OUTPATIENT)
Dept: FAMILY MEDICINE | Facility: CLINIC | Age: 62
End: 2022-12-09
Payer: COMMERCIAL

## 2022-12-09 VITALS
HEIGHT: 61 IN | HEART RATE: 88 BPM | BODY MASS INDEX: 40.5 KG/M2 | SYSTOLIC BLOOD PRESSURE: 114 MMHG | DIASTOLIC BLOOD PRESSURE: 74 MMHG | WEIGHT: 214.5 LBS | TEMPERATURE: 98 F | OXYGEN SATURATION: 99 %

## 2022-12-09 DIAGNOSIS — K76.0 NAFLD (NONALCOHOLIC FATTY LIVER DISEASE): ICD-10-CM

## 2022-12-09 DIAGNOSIS — Z13.0 SCREENING FOR DEFICIENCY ANEMIA: ICD-10-CM

## 2022-12-09 DIAGNOSIS — R26.9 GAIT ABNORMALITY: ICD-10-CM

## 2022-12-09 DIAGNOSIS — Z12.31 ENCOUNTER FOR SCREENING MAMMOGRAM FOR MALIGNANT NEOPLASM OF BREAST: ICD-10-CM

## 2022-12-09 DIAGNOSIS — E11.59 HYPERTENSION ASSOCIATED WITH TYPE 2 DIABETES MELLITUS: ICD-10-CM

## 2022-12-09 DIAGNOSIS — Z23 NEED FOR STREPTOCOCCUS PNEUMONIAE VACCINATION: ICD-10-CM

## 2022-12-09 DIAGNOSIS — E03.9 ACQUIRED HYPOTHYROIDISM: ICD-10-CM

## 2022-12-09 DIAGNOSIS — E11.69 HYPERLIPIDEMIA ASSOCIATED WITH TYPE 2 DIABETES MELLITUS: ICD-10-CM

## 2022-12-09 DIAGNOSIS — E78.5 HYPERLIPIDEMIA ASSOCIATED WITH TYPE 2 DIABETES MELLITUS: ICD-10-CM

## 2022-12-09 DIAGNOSIS — Z00.00 ENCOUNTER FOR BLOOD TEST FOR ROUTINE GENERAL PHYSICAL EXAMINATION: ICD-10-CM

## 2022-12-09 DIAGNOSIS — I15.2 HYPERTENSION ASSOCIATED WITH TYPE 2 DIABETES MELLITUS: ICD-10-CM

## 2022-12-09 DIAGNOSIS — E11.9 TYPE 2 DIABETES MELLITUS WITHOUT COMPLICATION, WITHOUT LONG-TERM CURRENT USE OF INSULIN: Primary | ICD-10-CM

## 2022-12-09 DIAGNOSIS — G47.33 OSA ON CPAP: ICD-10-CM

## 2022-12-09 PROCEDURE — 3078F DIAST BP <80 MM HG: CPT | Mod: CPTII,S$GLB,, | Performed by: NURSE PRACTITIONER

## 2022-12-09 PROCEDURE — 1160F RVW MEDS BY RX/DR IN RCRD: CPT | Mod: CPTII,S$GLB,, | Performed by: NURSE PRACTITIONER

## 2022-12-09 PROCEDURE — 1160F PR REVIEW ALL MEDS BY PRESCRIBER/CLIN PHARMACIST DOCUMENTED: ICD-10-PCS | Mod: CPTII,S$GLB,, | Performed by: NURSE PRACTITIONER

## 2022-12-09 PROCEDURE — 3078F PR MOST RECENT DIASTOLIC BLOOD PRESSURE < 80 MM HG: ICD-10-PCS | Mod: CPTII,S$GLB,, | Performed by: NURSE PRACTITIONER

## 2022-12-09 PROCEDURE — 90471 PNEUMOCOCCAL CONJUGATE VACCINE 20-VALENT: ICD-10-PCS | Mod: S$GLB,,, | Performed by: NURSE PRACTITIONER

## 2022-12-09 PROCEDURE — 4010F ACE/ARB THERAPY RXD/TAKEN: CPT | Mod: CPTII,S$GLB,, | Performed by: NURSE PRACTITIONER

## 2022-12-09 PROCEDURE — 3074F PR MOST RECENT SYSTOLIC BLOOD PRESSURE < 130 MM HG: ICD-10-PCS | Mod: CPTII,S$GLB,, | Performed by: NURSE PRACTITIONER

## 2022-12-09 PROCEDURE — 3061F NEG MICROALBUMINURIA REV: CPT | Mod: CPTII,S$GLB,, | Performed by: NURSE PRACTITIONER

## 2022-12-09 PROCEDURE — 99214 OFFICE O/P EST MOD 30 MIN: CPT | Mod: 25,S$GLB,, | Performed by: NURSE PRACTITIONER

## 2022-12-09 PROCEDURE — 4010F PR ACE/ARB THEARPY RXD/TAKEN: ICD-10-PCS | Mod: CPTII,S$GLB,, | Performed by: NURSE PRACTITIONER

## 2022-12-09 PROCEDURE — 3044F HG A1C LEVEL LT 7.0%: CPT | Mod: CPTII,S$GLB,, | Performed by: NURSE PRACTITIONER

## 2022-12-09 PROCEDURE — 90471 IMMUNIZATION ADMIN: CPT | Mod: S$GLB,,, | Performed by: NURSE PRACTITIONER

## 2022-12-09 PROCEDURE — 99999 PR PBB SHADOW E&M-EST. PATIENT-LVL III: ICD-10-PCS | Mod: PBBFAC,,, | Performed by: NURSE PRACTITIONER

## 2022-12-09 PROCEDURE — 3008F PR BODY MASS INDEX (BMI) DOCUMENTED: ICD-10-PCS | Mod: CPTII,S$GLB,, | Performed by: NURSE PRACTITIONER

## 2022-12-09 PROCEDURE — 3061F PR NEG MICROALBUMINURIA RESULT DOCUMENTED/REVIEW: ICD-10-PCS | Mod: CPTII,S$GLB,, | Performed by: NURSE PRACTITIONER

## 2022-12-09 PROCEDURE — 99214 PR OFFICE/OUTPT VISIT, EST, LEVL IV, 30-39 MIN: ICD-10-PCS | Mod: 25,S$GLB,, | Performed by: NURSE PRACTITIONER

## 2022-12-09 PROCEDURE — 90677 PCV20 VACCINE IM: CPT | Mod: S$GLB,,, | Performed by: NURSE PRACTITIONER

## 2022-12-09 PROCEDURE — 99999 PR PBB SHADOW E&M-EST. PATIENT-LVL III: CPT | Mod: PBBFAC,,, | Performed by: NURSE PRACTITIONER

## 2022-12-09 PROCEDURE — 3066F NEPHROPATHY DOC TX: CPT | Mod: CPTII,S$GLB,, | Performed by: NURSE PRACTITIONER

## 2022-12-09 PROCEDURE — 3008F BODY MASS INDEX DOCD: CPT | Mod: CPTII,S$GLB,, | Performed by: NURSE PRACTITIONER

## 2022-12-09 PROCEDURE — 3044F PR MOST RECENT HEMOGLOBIN A1C LEVEL <7.0%: ICD-10-PCS | Mod: CPTII,S$GLB,, | Performed by: NURSE PRACTITIONER

## 2022-12-09 PROCEDURE — 1159F PR MEDICATION LIST DOCUMENTED IN MEDICAL RECORD: ICD-10-PCS | Mod: CPTII,S$GLB,, | Performed by: NURSE PRACTITIONER

## 2022-12-09 PROCEDURE — 3074F SYST BP LT 130 MM HG: CPT | Mod: CPTII,S$GLB,, | Performed by: NURSE PRACTITIONER

## 2022-12-09 PROCEDURE — 90677 PNEUMOCOCCAL CONJUGATE VACCINE 20-VALENT: ICD-10-PCS | Mod: S$GLB,,, | Performed by: NURSE PRACTITIONER

## 2022-12-09 PROCEDURE — 1159F MED LIST DOCD IN RCRD: CPT | Mod: CPTII,S$GLB,, | Performed by: NURSE PRACTITIONER

## 2022-12-09 PROCEDURE — 3066F PR DOCUMENTATION OF TREATMENT FOR NEPHROPATHY: ICD-10-PCS | Mod: CPTII,S$GLB,, | Performed by: NURSE PRACTITIONER

## 2022-12-09 RX ORDER — SODIUM, POTASSIUM,MAG SULFATES 17.5-3.13G
SOLUTION, RECONSTITUTED, ORAL ORAL
COMMUNITY
Start: 2022-08-03 | End: 2022-12-09

## 2022-12-09 NOTE — PROGRESS NOTES
"Subjective:       Patient ID: Digna Bhatia is a 61 y.o. female.    Chief Complaint: Follow-up (6 months F/U)    HPI    Patient is a 61-year-old white female with Type 2 Diabetes definitively diagnosed in August 2019 but Prediabetic since October 2018, hypertension, hyperlipidemia, hypothyroidism, fatty liver disease noted on ultrasound May 2017 with elevated liver enzymes, and obstructive sleep apnea with CPAP use that is here today for 6 month follow up with fasting lab results. Patient also has complaint of right foot turning inward when she walks and chronic right outer ankle swelling that podiatrist feels is likely lipoma.     TYPE 2 DIABETES  Currently taking  Metformin XR 1000 mg daily   FBG is now 158 with HgbA1C 6.1%.      Hyperlipidemia   controlled on Rosuvastatin 10 mg daily.   LDL 84.4     Hypertension  Currently controlled on present medications, amlodipine 5 mg daily and lisinopril HCT 20/25 mg daily.  /74 (BP Location: Left arm, Patient Position: Sitting, BP Method: Large (Manual))   Pulse 88   Temp 97.9 °F (36.6 °C) (Temporal)   Ht 5' 1" (1.549 m)   Wt 97.3 kg (214 lb 8.1 oz)   LMP  (LMP Unknown)   SpO2 99%   BMI 40.53 kg/m²        Hypothyroidism   Controlled on present medication, Synthroid 112 µg daily with TSH 1.45 and free T4 0.91     Fatty Liver Disease/Gilbert's syndrome  Patient has always had an elevated total bilirubin in the past but her other liver enzymes started to climb in May 2017.  Patient was sent to GI for evaluation.  She has had a negative hepatitis panel and a liver ultrasound in May 2017 showed FATTY LIVER DISEASE.    Patient's  liver enzymes have returned to normal range since 2017 other than chronically high bilirubin 1.7     Obstructive sleep apnea   Noncompliant with CPAP machine use - state machine was part of recall and never had fixed.     Gait Abnormality with Chronic Right Ankle Swelling  Patient reported she started noting that her right foot turns " INWARD when she walks and has started having more pain to the foot.    She has CHRONIC Right outer ankle swelling for multiple years that states occurs after an injury years ago.  Referred to podiatry to further evaluate the gait abnormality.  Seen Dr. Owens - he advised the right ankle swelling secondary to a LIPOMA  She FORGOT TO DISCUSS THE right ankle turning inward - advised patient to reschedule a follow up with Dr. Owens    Component      Latest Ref Rng & Units 12/6/2022 6/6/2022 12/3/2021 5/28/2021   Sodium      136 - 145 mmol/L 138 138 138 143   Potassium      3.5 - 5.1 mmol/L 4.5 4.1 4.2 4.8   Chloride      95 - 110 mmol/L 103 102 102 103   CO2      23 - 29 mmol/L 28 27 26 31 (H)   Glucose      70 - 110 mg/dL 158 (H) 168 (H) 131 (H) 135 (H)   BUN      7 - 17 mg/dL 22 (H) 19 (H) 26 (H) 22 (H)   Creatinine      0.50 - 1.40 mg/dL 0.81 0.84 0.89 0.82   Calcium      8.7 - 10.5 mg/dL 9.0 9.3 9.7 10.3   PROTEIN TOTAL      6.0 - 8.4 g/dL 6.8 7.3 7.5 7.7   Albumin      3.5 - 5.2 g/dL 4.2 4.5 4.9 4.7   BILIRUBIN TOTAL      0.1 - 1.0 mg/dL 1.7 (H) 1.8 (H) 1.8 (H) 1.3 (H)   Alkaline Phosphatase      38 - 126 U/L 78 84 78 80   AST      15 - 46 U/L 33 28 22 19   ALT      10 - 44 U/L 34 29 23 17   ANION GAP      8 - 16 mmol/L 7 (L) 9 10 9   eGFR      >60 mL/min/1.73 m:2 >60.0      Cholesterol      120 - 199 mg/dL 158 139 172 145   Triglycerides      30 - 150 mg/dL 78 111 94 79   HDL      40 - 75 mg/dL 58 52 60 55   LDL Cholesterol External      63.0 - 159.0 mg/dL 84.4 64.8 93.2 74.2   HDL/Cholesterol Ratio      20.0 - 50.0 % 36.7 37.4 34.9 37.9   Total Cholesterol/HDL Ratio      2.0 - 5.0 2.7 2.7 2.9 2.6   Non-HDL Cholesterol      mg/dL 100 87 112 90   Hemoglobin A1C External      4.0 - 5.6 % 6.1 (H) 6.6 (H) 6.5 (H) 5.5   Estimated Avg Glucose      68 - 131 mg/dL 128 143 (H) 140 (H) 111   TSH      0.400 - 4.000 uIU/mL 1.450 0.914 3.790 0.836   Free T4      0.71 - 1.51 ng/dL 0.91 1.11 1.07 1.32        Review of Systems  "  Constitutional:  Negative for appetite change, chills, fatigue, fever and unexpected weight change.   HENT:  Negative for congestion, ear pain, mouth sores, nosebleeds, postnasal drip, rhinorrhea, sinus pressure, sneezing, sore throat, trouble swallowing and voice change.    Eyes:  Negative for photophobia, pain, discharge, redness, itching and visual disturbance.   Respiratory:  Negative for cough, chest tightness and shortness of breath.    Cardiovascular:  Negative for chest pain, palpitations and leg swelling.   Gastrointestinal:  Negative for abdominal pain, blood in stool, constipation, diarrhea, nausea and vomiting.   Genitourinary:  Negative for dysuria, frequency, hematuria and urgency.   Musculoskeletal:  Negative for arthralgias, back pain, joint swelling and myalgias.   Skin:  Negative for color change and rash.   Allergic/Immunologic: Negative for immunocompromised state.   Neurological:  Negative for dizziness, seizures, syncope, weakness and headaches.   Hematological:  Negative for adenopathy. Does not bruise/bleed easily.   Psychiatric/Behavioral:  Negative for agitation, dysphoric mood, sleep disturbance and suicidal ideas. The patient is not nervous/anxious.        Objective:     Vitals:    12/09/22 1138   BP: 114/74   BP Location: Left arm   Patient Position: Sitting   BP Method: Large (Manual)   Pulse: 88   Temp: 97.9 °F (36.6 °C)   TempSrc: Temporal   SpO2: 99%   Weight: 97.3 kg (214 lb 8.1 oz)   Height: 5' 1" (1.549 m)          Physical Exam  Constitutional:       General: She is not in acute distress.     Appearance: She is well-developed. She is obese. She is not ill-appearing, toxic-appearing or diaphoretic.      Comments: + obesity. Body mass index is 40.53 kg/m².   HENT:      Head: Normocephalic and atraumatic.      Right Ear: External ear normal.      Left Ear: External ear normal.   Eyes:      General: No scleral icterus.        Right eye: No discharge.         Left eye: No discharge. "      Extraocular Movements: Extraocular movements intact.      Conjunctiva/sclera: Conjunctivae normal.   Neck:      Thyroid: No thyromegaly.      Vascular: No JVD.      Trachea: No tracheal deviation.   Cardiovascular:      Rate and Rhythm: Normal rate and regular rhythm.      Heart sounds: Normal heart sounds. No murmur heard.  Pulmonary:      Effort: Pulmonary effort is normal. No respiratory distress.      Breath sounds: Normal breath sounds. No stridor. No wheezing or rales.   Abdominal:      General: There is no distension.   Musculoskeletal:         General: Normal range of motion.      Cervical back: Normal range of motion and neck supple.   Lymphadenopathy:      Cervical: No cervical adenopathy.   Skin:     General: Skin is warm and dry.      Findings: No rash.   Neurological:      Mental Status: She is alert and oriented to person, place, and time.      Cranial Nerves: No cranial nerve deficit.      Coordination: Coordination normal.   Psychiatric:         Mood and Affect: Mood normal.         Behavior: Behavior normal.         Thought Content: Thought content normal.         Judgment: Judgment normal.         Assessment:         ICD-10-CM ICD-9-CM   1. Type 2 diabetes mellitus without complication, without long-term current use of insulin  E11.9 250.00   2. Hyperlipidemia associated with type 2 diabetes mellitus  E11.69 250.80    E78.5 272.4   3. Hypertension associated with type 2 diabetes mellitus  E11.59 250.80    I15.2 401.9   4. Acquired hypothyroidism  E03.9 244.9   5. NAFLD (nonalcoholic fatty liver disease)  K76.0 571.8   6. KELLY on CPAP  G47.33 327.23    Z99.89 V46.8   7. Gait abnormality  R26.9 781.2   8. Encounter for screening mammogram for malignant neoplasm of breast  Z12.31 V76.12   9. Need for Streptococcus pneumoniae vaccination  Z23 V03.82       Plan:       Type 2 diabetes mellitus without complication, without long-term current use of insulin  Continue current medication(s).  Follow up  in 6 months.    -     (In Office Administered) Pneumococcal Conjugate Vaccine (20 Valent) (IM)    Hyperlipidemia associated with type 2 diabetes mellitus  Continue current medication(s).  Follow up in 6 months.      Hypertension associated with type 2 diabetes mellitus  Continue current medication(s).  Follow up in 6 months.      Acquired hypothyroidism  Continue current medication(s).  Follow up in 6 months.      NAFLD (nonalcoholic fatty liver disease)  Work on weight loss    KELLY on CPAP  Wear CPAP nightly    Gait abnormality  See Podiatrist about foot turning inward    Encounter for screening mammogram for malignant neoplasm of breast    Need for Streptococcus pneumoniae vaccination  -     (In Office Administered) Pneumococcal Conjugate Vaccine (20 Valent) (IM)    Follow up in about 6 months (around 6/9/2023) for fasting labs and WELLNESS EXAM.     Patient's Medications   New Prescriptions    No medications on file   Previous Medications    AMLODIPINE (NORVASC) 5 MG TABLET    TAKE 1 TABLET(5 MG) BY MOUTH EVERY DAY    ASPIRIN (ECOTRIN) 81 MG EC TABLET    Take 81 mg by mouth once daily.    LEVOTHYROXINE (SYNTHROID) 112 MCG TABLET    TAKE 1 TABLET BY MOUTH DAILY BEFORE AND BREAKFAST    LISINOPRIL-HYDROCHLOROTHIAZIDE (PRINZIDE,ZESTORETIC) 20-25 MG TAB    TAKE 1 TABLET BY MOUTH EVERY DAY    METFORMIN (GLUCOPHAGE-XR) 500 MG ER 24HR TABLET    Take 2 tablets (1,000 mg total) by mouth daily with breakfast.    ROSUVASTATIN (CRESTOR) 10 MG TABLET    TAKE 1 TABLET(10 MG) BY MOUTH EVERY DAY   Modified Medications    No medications on file   Discontinued Medications    SUPREP BOWEL PREP KIT 17.5-3.13-1.6 GRAM SOLR    TAKE 177 ML BY MOUTH EVERY DAY FOR 2 DAYS       Past Medical History:   Diagnosis Date    H/O mammogram 3/31/2016    has done at [facility]    Hyperlipidemia 9/12/2017    Hypertension     Hyperthyroidism     Hypothyroidism     IFG (impaired fasting glucose)     NAFLD (nonalcoholic fatty liver disease)     KELLY  (obstructive sleep apnea)     on CPAP    KELLY on CPAP     Screening for colorectal cancer 2016    Type 2 diabetes mellitus without complication, without long-term current use of insulin 2019       Past Surgical History:   Procedure Laterality Date     SECTION      COLONOSCOPY N/A 2016    Procedure: COLONOSCOPY-Miralax split prep ;  Surgeon: Cali Oliveira Jr., MD;  Location: [facility] ENDO;  Service: Endoscopy;  Laterality: N/A;    COLONOSCOPY N/A 2022    Procedure: COLONOSCOPY;  Surgeon: Eli Kong MD;  Location: [facility] ENDO;  Service: Endoscopy;  Laterality: N/A;    DILATION AND CURETTAGE OF UTERUS         Family History   Problem Relation Age of Onset    Dementia Mother     Parkinsonism Mother         PASSED AGE 78    Hypertension Father     Cancer Father         KIDNEY PASSED AGE 70    Heart disease Father 50        HEART ATTACK    Diabetes Father     Hypertension Sister     Diabetes Brother     Hypertension Brother     Diabetes Sister     Hypertension Sister     Hypertension Sister     Hypertension Brother     No Known Problems Daughter     No Known Problems Son     No Known Problems Son     Breast cancer Neg Hx     Colon cancer Neg Hx     Ovarian cancer Neg Hx        Social History     Socioeconomic History    Marital status:    Tobacco Use    Smoking status: Former     Packs/day: 0.10     Years: 8.00     Pack years: 0.80     Types: Cigarettes     Quit date:      Years since quittin.9    Smokeless tobacco: Never    Tobacco comments:     socially only for around 20 years but not every day   Substance and Sexual Activity    Alcohol use: Yes     Comment: rare    Drug use: No    Sexual activity: Not Currently     Birth control/protection: Post-menopausal

## 2023-06-15 ENCOUNTER — OFFICE VISIT (OUTPATIENT)
Dept: FAMILY MEDICINE | Facility: CLINIC | Age: 63
End: 2023-06-15
Payer: COMMERCIAL

## 2023-06-15 VITALS
HEART RATE: 83 BPM | TEMPERATURE: 98 F | OXYGEN SATURATION: 98 % | HEIGHT: 61 IN | BODY MASS INDEX: 38.92 KG/M2 | DIASTOLIC BLOOD PRESSURE: 64 MMHG | SYSTOLIC BLOOD PRESSURE: 106 MMHG | WEIGHT: 206.13 LBS

## 2023-06-15 DIAGNOSIS — E03.9 ACQUIRED HYPOTHYROIDISM: ICD-10-CM

## 2023-06-15 DIAGNOSIS — M79.672 BILATERAL FOOT PAIN: ICD-10-CM

## 2023-06-15 DIAGNOSIS — I15.2 HYPERTENSION ASSOCIATED WITH TYPE 2 DIABETES MELLITUS: ICD-10-CM

## 2023-06-15 DIAGNOSIS — G47.33 OSA ON CPAP: ICD-10-CM

## 2023-06-15 DIAGNOSIS — K76.0 NAFLD (NONALCOHOLIC FATTY LIVER DISEASE): ICD-10-CM

## 2023-06-15 DIAGNOSIS — R26.9 GAIT ABNORMALITY: ICD-10-CM

## 2023-06-15 DIAGNOSIS — E11.9 TYPE 2 DIABETES MELLITUS WITHOUT COMPLICATION, WITHOUT LONG-TERM CURRENT USE OF INSULIN: ICD-10-CM

## 2023-06-15 DIAGNOSIS — E66.01 CLASS 2 SEVERE OBESITY DUE TO EXCESS CALORIES WITH SERIOUS COMORBIDITY AND BODY MASS INDEX (BMI) OF 38.0 TO 38.9 IN ADULT: ICD-10-CM

## 2023-06-15 DIAGNOSIS — E78.5 HYPERLIPIDEMIA ASSOCIATED WITH TYPE 2 DIABETES MELLITUS: ICD-10-CM

## 2023-06-15 DIAGNOSIS — E11.59 HYPERTENSION ASSOCIATED WITH TYPE 2 DIABETES MELLITUS: ICD-10-CM

## 2023-06-15 DIAGNOSIS — M79.671 BILATERAL FOOT PAIN: ICD-10-CM

## 2023-06-15 DIAGNOSIS — Z00.00 ANNUAL PHYSICAL EXAM: Primary | ICD-10-CM

## 2023-06-15 DIAGNOSIS — E11.69 HYPERLIPIDEMIA ASSOCIATED WITH TYPE 2 DIABETES MELLITUS: ICD-10-CM

## 2023-06-15 PROBLEM — E66.812 CLASS 2 SEVERE OBESITY DUE TO EXCESS CALORIES WITH SERIOUS COMORBIDITY AND BODY MASS INDEX (BMI) OF 38.0 TO 38.9 IN ADULT: Status: ACTIVE | Noted: 2017-09-12

## 2023-06-15 LAB
ALBUMIN/CREAT UR: 3.7 UG/MG (ref 0–30)
CREAT UR-MCNC: 134 MG/DL (ref 15–325)
MICROALBUMIN UR DL<=1MG/L-MCNC: 5 UG/ML

## 2023-06-15 PROCEDURE — 99396 PREV VISIT EST AGE 40-64: CPT | Mod: S$GLB,,, | Performed by: NURSE PRACTITIONER

## 2023-06-15 PROCEDURE — 1160F PR REVIEW ALL MEDS BY PRESCRIBER/CLIN PHARMACIST DOCUMENTED: ICD-10-PCS | Mod: CPTII,S$GLB,, | Performed by: NURSE PRACTITIONER

## 2023-06-15 PROCEDURE — 3074F SYST BP LT 130 MM HG: CPT | Mod: CPTII,S$GLB,, | Performed by: NURSE PRACTITIONER

## 2023-06-15 PROCEDURE — 1159F MED LIST DOCD IN RCRD: CPT | Mod: CPTII,S$GLB,, | Performed by: NURSE PRACTITIONER

## 2023-06-15 PROCEDURE — 99999 PR PBB SHADOW E&M-EST. PATIENT-LVL IV: ICD-10-PCS | Mod: PBBFAC,,, | Performed by: NURSE PRACTITIONER

## 2023-06-15 PROCEDURE — 3074F PR MOST RECENT SYSTOLIC BLOOD PRESSURE < 130 MM HG: ICD-10-PCS | Mod: CPTII,S$GLB,, | Performed by: NURSE PRACTITIONER

## 2023-06-15 PROCEDURE — 3008F BODY MASS INDEX DOCD: CPT | Mod: CPTII,S$GLB,, | Performed by: NURSE PRACTITIONER

## 2023-06-15 PROCEDURE — 4010F ACE/ARB THERAPY RXD/TAKEN: CPT | Mod: CPTII,S$GLB,, | Performed by: NURSE PRACTITIONER

## 2023-06-15 PROCEDURE — 82570 ASSAY OF URINE CREATININE: CPT | Performed by: NURSE PRACTITIONER

## 2023-06-15 PROCEDURE — 1160F RVW MEDS BY RX/DR IN RCRD: CPT | Mod: CPTII,S$GLB,, | Performed by: NURSE PRACTITIONER

## 2023-06-15 PROCEDURE — 1159F PR MEDICATION LIST DOCUMENTED IN MEDICAL RECORD: ICD-10-PCS | Mod: CPTII,S$GLB,, | Performed by: NURSE PRACTITIONER

## 2023-06-15 PROCEDURE — 4010F PR ACE/ARB THEARPY RXD/TAKEN: ICD-10-PCS | Mod: CPTII,S$GLB,, | Performed by: NURSE PRACTITIONER

## 2023-06-15 PROCEDURE — 3044F HG A1C LEVEL LT 7.0%: CPT | Mod: CPTII,S$GLB,, | Performed by: NURSE PRACTITIONER

## 2023-06-15 PROCEDURE — 3044F PR MOST RECENT HEMOGLOBIN A1C LEVEL <7.0%: ICD-10-PCS | Mod: CPTII,S$GLB,, | Performed by: NURSE PRACTITIONER

## 2023-06-15 PROCEDURE — 99396 PR PREVENTIVE VISIT,EST,40-64: ICD-10-PCS | Mod: S$GLB,,, | Performed by: NURSE PRACTITIONER

## 2023-06-15 PROCEDURE — 3078F PR MOST RECENT DIASTOLIC BLOOD PRESSURE < 80 MM HG: ICD-10-PCS | Mod: CPTII,S$GLB,, | Performed by: NURSE PRACTITIONER

## 2023-06-15 PROCEDURE — 99999 PR PBB SHADOW E&M-EST. PATIENT-LVL IV: CPT | Mod: PBBFAC,,, | Performed by: NURSE PRACTITIONER

## 2023-06-15 PROCEDURE — 3008F PR BODY MASS INDEX (BMI) DOCUMENTED: ICD-10-PCS | Mod: CPTII,S$GLB,, | Performed by: NURSE PRACTITIONER

## 2023-06-15 PROCEDURE — 3078F DIAST BP <80 MM HG: CPT | Mod: CPTII,S$GLB,, | Performed by: NURSE PRACTITIONER

## 2023-06-15 RX ORDER — AMLODIPINE BESYLATE 5 MG/1
5 TABLET ORAL DAILY
Qty: 90 TABLET | Refills: 1 | Status: SHIPPED | OUTPATIENT
Start: 2023-06-15 | End: 2024-01-19

## 2023-06-15 RX ORDER — LEVOTHYROXINE SODIUM 112 UG/1
TABLET ORAL
Qty: 90 TABLET | Refills: 1 | Status: SHIPPED | OUTPATIENT
Start: 2023-06-15 | End: 2024-01-19

## 2023-06-15 RX ORDER — HALOBETASOL PROPIONATE AND TAZAROTENE .1; .45 MG/G; MG/G
LOTION TOPICAL
COMMUNITY
Start: 2022-12-18

## 2023-06-15 RX ORDER — LISINOPRIL AND HYDROCHLOROTHIAZIDE 20; 25 MG/1; MG/1
1 TABLET ORAL DAILY
Qty: 90 TABLET | Refills: 1 | Status: SHIPPED | OUTPATIENT
Start: 2023-06-15 | End: 2024-01-19

## 2023-06-15 RX ORDER — ROSUVASTATIN CALCIUM 10 MG/1
10 TABLET, COATED ORAL DAILY
Qty: 90 EACH | Refills: 1 | Status: SHIPPED | OUTPATIENT
Start: 2023-06-15 | End: 2024-01-19

## 2023-06-15 RX ORDER — METFORMIN HYDROCHLORIDE 500 MG/1
1000 TABLET, EXTENDED RELEASE ORAL
Qty: 180 TABLET | Refills: 1 | Status: SHIPPED | OUTPATIENT
Start: 2023-06-15 | End: 2024-01-16 | Stop reason: SDUPTHER

## 2023-06-15 NOTE — PROGRESS NOTES
"Subjective:       Patient ID: Digna Bhatia is a 62 y.o. female.    Chief Complaint: Annual Exam    HPI    Patient is a 62-year-old white female with Type 2 Diabetes definitively diagnosed in August 2019 but Prediabetic since October 2018, hypertension, hyperlipidemia, hypothyroidism, fatty liver disease noted on ultrasound May 2017 with elevated liver enzymes, and obstructive sleep apnea with CPAP use that is here today for ANNUAL PHYSICAL EXAM with fasting lab results. Patient also has complaint of right foot turning inward when she walks and chronic right outer ankle swelling that podiatrist feels is likely lipoma and bilateral foot pain.     TYPE 2 DIABETES  Currently taking  Metformin XR 1000 mg daily   FBG is now 135 with HgbA1C 5.9%.      Hyperlipidemia   controlled on Rosuvastatin 10 mg daily.   LDL 63.4     Hypertension  Currently controlled on present medications, amlodipine 5 mg daily and lisinopril HCT 20/25 mg daily.  /64 (BP Location: Right arm, Patient Position: Sitting, BP Method: Large (Manual))   Pulse 83   Temp 98.1 °F (36.7 °C) (Temporal)   Ht 5' 1" (1.549 m)   Wt 93.5 kg (206 lb 2.1 oz)   LMP  (LMP Unknown)   SpO2 98%   BMI 38.95 kg/m²        Hypothyroidism   Controlled on present medication, Synthroid 112 µg daily   TSH 0.429 and free T4 1.26     Fatty Liver Disease/Gilbert's syndrome  Patient has always had an elevated total bilirubin in the past but her other liver enzymes started to climb in May 2017.  Patient was sent to GI for evaluation.  She has had a negative hepatitis panel and a liver ultrasound in May 2017 showed FATTY LIVER DISEASE.    Patient's  liver enzymes have returned to normal range since 2017 other than chronically high bilirubin 2.4     Obstructive sleep apnea   Noncompliant with CPAP machine use - state machine was part of recall and never had fixed.     Gait Abnormality with Chronic Right Ankle Swelling  Patient reported she started noting that her right " foot turns INWARD when she walks and has started having more pain to the foot.    She has CHRONIC Right outer ankle swelling for multiple years that states occurs after an injury years ago.  Referred to podiatry to further evaluate the gait abnormality.  Seen Dr. Owens - he advised the right ankle swelling secondary to a LIPOMA  She FORGOT TO DISCUSS THE right ankle turning inward and the pain- Dr. Owens is leaving so will set her up appointment with Dr. Zapata to further evaluate.    Wellness labs:  CBC normal  CMP with gilbert syndrome 2.4, , A1C 5.9%, kidney and liver okay  Cholesterol was controlled on med  Thyroid well controlled on med    Health Maintenance:  Diabetic urine screening today  Diabetic foot exam today  Eye exam scheduled for July    Component      Latest Ref Rng & Units 6/13/2023 12/6/2022 6/6/2022   WBC      3.90 - 12.70 K/uL 5.93     RBC      4.00 - 5.40 M/uL 4.43     Hemoglobin      12.0 - 16.0 g/dL 13.0     Hematocrit      37.0 - 48.5 % 37.3     MCV      82 - 98 fL 84     MCH      27.0 - 31.0 pg 29.3     MCHC      32.0 - 36.0 g/dL 34.9     RDW      11.5 - 14.5 % 12.8     Platelets      150 - 450 K/uL 249     MPV      9.2 - 12.9 fL 10.2     Immature Granulocytes      0.0 - 0.5 % 0.2     Gran # (ANC)      1.8 - 7.7 K/uL 3.4     Immature Grans (Abs)      0.00 - 0.04 K/uL 0.01     Lymph #      1.0 - 4.8 K/uL 1.7     Mono #      0.3 - 1.0 K/uL 0.6     Eos #      0.0 - 0.5 K/uL 0.2     Baso #      0.00 - 0.20 K/uL 0.10     nRBC      0 /100 WBC 0     Gran %      38.0 - 73.0 % 56.8     Lymph %      18.0 - 48.0 % 28.0     Mono %      4.0 - 15.0 % 9.8     Eosinophil %      0.0 - 8.0 % 3.5     Basophil %      0.0 - 1.9 % 1.7     Differential Method       Automated     Sodium      136 - 145 mmol/L 137 138 138   Potassium      3.5 - 5.1 mmol/L 3.9 4.5 4.1   Chloride      95 - 110 mmol/L 101 103 102   CO2      23 - 29 mmol/L 24 28 27   Glucose      70 - 110 mg/dL 135 (H) 158 (H) 168 (H)   BUN      7  - 17 mg/dL 23 (H) 22 (H) 19 (H)   Creatinine      0.50 - 1.40 mg/dL 0.94 0.81 0.84   Calcium      8.7 - 10.5 mg/dL 9.3 9.0 9.3   PROTEIN TOTAL      6.0 - 8.4 g/dL 7.1 6.8 7.3   Albumin      3.5 - 5.2 g/dL 4.5 4.2 4.5   BILIRUBIN TOTAL      0.1 - 1.0 mg/dL 2.4 (H) 1.7 (H) 1.8 (H)   Alkaline Phosphatase      38 - 126 U/L 74 78 84   AST      15 - 46 U/L 29 33 28   ALT      10 - 44 U/L 27 34 29   Anion Gap      8 - 16 mmol/L 12 7 (L) 9   eGFR      >60 mL/min/1.73 m:2 >60.0 >60.0    Cholesterol      120 - 199 mg/dL 131 158 139   Triglycerides      30 - 150 mg/dL 73 78 111   HDL      40 - 75 mg/dL 53 58 52   LDL Cholesterol External      63.0 - 159.0 mg/dL 63.4 84.4 64.8   HDL/Cholesterol Ratio      20.0 - 50.0 % 40.5 36.7 37.4   Total Cholesterol/HDL Ratio      2.0 - 5.0 2.5 2.7 2.7   Non-HDL Cholesterol      mg/dL 78 100 87   Hemoglobin A1C External      4.0 - 5.6 % 5.9 (H) 6.1 (H) 6.6 (H)   Estimated Avg Glucose      68 - 131 mg/dL 123 128 143 (H)   TSH      0.400 - 4.000 uIU/mL 0.429 1.450 0.914   Free T4      0.71 - 1.51 ng/dL 1.26 0.91 1.11        Review of Systems   Constitutional:  Negative for appetite change, chills, fatigue, fever and unexpected weight change.   HENT:  Negative for congestion, ear pain, mouth sores, nosebleeds, postnasal drip, rhinorrhea, sinus pressure, sneezing, sore throat, trouble swallowing and voice change.    Eyes:  Negative for photophobia, pain, discharge, redness, itching and visual disturbance.   Respiratory:  Negative for cough, chest tightness and shortness of breath.    Cardiovascular:  Negative for chest pain, palpitations and leg swelling.   Gastrointestinal:  Negative for abdominal pain, blood in stool, constipation, diarrhea, nausea and vomiting.   Genitourinary:  Negative for dysuria, frequency, hematuria and urgency.   Musculoskeletal:  Positive for arthralgias and gait problem. Negative for back pain, joint swelling and myalgias.   Skin:  Negative for color change and rash.  "  Allergic/Immunologic: Negative for immunocompromised state.   Neurological:  Negative for dizziness, seizures, syncope, weakness and headaches.   Hematological:  Negative for adenopathy. Does not bruise/bleed easily.   Psychiatric/Behavioral:  Negative for agitation, dysphoric mood, sleep disturbance and suicidal ideas. The patient is not nervous/anxious.        Objective:     Vitals:    06/15/23 1450   BP: 106/64   BP Location: Right arm   Patient Position: Sitting   BP Method: Large (Manual)   Pulse: 83   Temp: 98.1 °F (36.7 °C)   TempSrc: Temporal   SpO2: 98%   Weight: 93.5 kg (206 lb 2.1 oz)   Height: 5' 1" (1.549 m)          Physical Exam  Constitutional:       General: She is not in acute distress.     Appearance: She is well-developed. She is obese. She is not ill-appearing, toxic-appearing or diaphoretic.      Comments: + obesity. Body mass index is 38.95 kg/m².    HENT:      Head: Normocephalic and atraumatic.      Right Ear: External ear normal.      Left Ear: External ear normal.   Eyes:      General: No scleral icterus.        Right eye: No discharge.         Left eye: No discharge.      Extraocular Movements: Extraocular movements intact.      Conjunctiva/sclera: Conjunctivae normal.   Neck:      Thyroid: No thyromegaly.      Vascular: No JVD.      Trachea: No tracheal deviation.   Cardiovascular:      Rate and Rhythm: Normal rate and regular rhythm.      Pulses:           Dorsalis pedis pulses are 2+ on the right side and 2+ on the left side.      Heart sounds: Normal heart sounds. No murmur heard.  Pulmonary:      Effort: Pulmonary effort is normal. No respiratory distress.      Breath sounds: Normal breath sounds. No stridor. No wheezing or rales.   Abdominal:      General: There is no distension.   Musculoskeletal:         General: Normal range of motion.      Cervical back: Normal range of motion and neck supple.   Feet:      Right foot:      Protective Sensation: 7 sites tested.  7 sites sensed. "      Skin integrity: No ulcer or skin breakdown.      Left foot:      Protective Sensation: 7 sites tested.  7 sites sensed.      Skin integrity: No ulcer or skin breakdown.   Lymphadenopathy:      Cervical: No cervical adenopathy.   Skin:     General: Skin is warm and dry.      Findings: No rash.   Neurological:      Mental Status: She is alert and oriented to person, place, and time.      Cranial Nerves: No cranial nerve deficit.      Coordination: Coordination normal.   Psychiatric:         Mood and Affect: Mood normal.         Behavior: Behavior normal.         Thought Content: Thought content normal.         Judgment: Judgment normal.         Assessment:         ICD-10-CM ICD-9-CM   1. Annual physical exam  Z00.00 V70.0   2. Type 2 diabetes mellitus without complication, without long-term current use of insulin  E11.9 250.00   3. Hyperlipidemia associated with type 2 diabetes mellitus  E11.69 250.80    E78.5 272.4   4. Hypertension associated with type 2 diabetes mellitus  E11.59 250.80    I15.2 401.9   5. Class 2 severe obesity due to excess calories with serious comorbidity and body mass index (BMI) of 38.0 to 38.9 in adult  E66.01 278.01    Z68.38 V85.38   6. NAFLD (nonalcoholic fatty liver disease)  K76.0 571.8   7. Acquired hypothyroidism  E03.9 244.9   8. KELLY on CPAP  G47.33 327.23    Z99.89 V46.8   9. Bilateral foot pain  M79.671 729.5    M79.672    10. Gait abnormality  R26.9 781.2   11. Diabetes mellitus screening  Z13.1 V77.1   12. Thyroid disorder screen  Z13.29 V77.0   13. Encounter for blood test for routine general physical examination  Z00.00 V72.62       Plan:       Annual physical exam  Health Maintenance Summary     Full History      Expand All  Collapse All    Ordered - Diabetes Urine Screening  (Yearly)  Ordered on 6/15/2023  06/06/2022  MICROALB/CREAT RATIO component of Microalbumin/Creatinine Ratio, Urine    05/28/2021  MICROALB/CREAT RATIO component of Microalbumin/creatinine urine ratio       Postponed - Eye Exam  (Yearly)  Postponed until 7/26/2023 07/12/2022   DIABETES EYE EXAM    10/27/2020   DIABETES EYE EXAM    06/18/2019  SmartData: WORKFLOW - HEALTHY PLANET - EXTERNAL DATA - EXTERNAL PROCEDURES DATE - EYE EXAM      Postponed - COVID-19 Vaccine  (3 - Moderna series)  Postponed until 6/15/2024  04/21/2021  Imm Admin: COVID-19, MRNA, LN-S, PF (MODERNA FULL 0.5 ML DOSE)    03/24/2021  Imm Admin: COVID-19, MRNA, LN-S, PF (MODERNA FULL 0.5 ML DOSE)      Scheduled - Hemoglobin A1c  (Every 6 Months)  Scheduled for 12/11/2023 06/13/2023  Hemoglobin A1C External component of Hemoglobin A1C    12/06/2022  Hemoglobin A1C External component of Hemoglobin A1C    06/06/2022  Hemoglobin A1C External component of Hemoglobin A1C    12/03/2021  Hemoglobin A1C External component of Hemoglobin A1C    05/28/2021  Hemoglobin A1C External component of Hemoglobin A1C    View More History     Mammogram  (Yearly)  Next due on 1/17/2024 01/17/2023  Mammo Digital Screening Bilat w/ Mesfin    01/14/2022  [facility]    01/14/2022  Mammo Previous    10/16/2020  [facility]    09/12/2019  [facility]    View More History     Lipid Panel  (Yearly)  Next due on 6/13/2024 06/13/2023  Lipid Panel    12/06/2022  Lipid Panel    06/06/2022  Lipid Panel    12/03/2021  Lipid Panel    05/28/2021  Lipid Panel    View More History     Low Dose Statin  (Yearly)  Next due on 6/15/2024  06/15/2023  Registry Metric: Last Current Statin Reviewed Date    01/31/2023  Registry Metric: Last Current Statin Order Date      Scheduled - Foot Exam  (Yearly)  Scheduled for 7/12/2023  06/15/2023  Done    06/10/2022  Done    06/10/2022  SmartData: WORKFLOW - DIABETES - DIABETIC FOOT EXAM PERFORMED    06/01/2021  SmartData: WORKFLOW - DIABETES - DIABETIC FOOT EXAM PERFORMED    12/01/2020  SmartData: WORKFLOW - DIABETES - DIABETIC FOOT EXAM PERFORMED    View More History     Colorectal Cancer Screening  (Colonoscopy - Every 3 Years)  Next  due on 8/5/2025 08/05/2022  Colonoscopy    08/05/2022  Surgical Procedure: COLONOSCOPY    07/29/2016  Colonoscopy    07/29/2016  Surgical Procedure: COLONOSCOPY      Cervical Cancer Screening  (HPV/Cotest - Every 5 Years)  Next due on 2/18/2026 06/30/2022  Pap Smear, Thin Prep with Reflex to HPV    02/18/2021  Multiple components of HPV High Risk Genotypes, PCR    02/18/2021  Liquid-Based Pap Smear, Screening    08/23/2018  Liquid-based pap smear, screening    03/02/2017  SmartData: EXTERNAL LAB DATE - HPV TESTING    View More History     TETANUS VACCINE  (Every 10 Years)  Next due on 7/26/2026 07/26/2016  Imm Admin: Tdap      Hepatitis C Screening  Completed  05/05/2017  Hepatitis C Ab component of Hepatitis panel, acute    07/21/2016  Hepatitis C Ab component of Hepatitis C antibody      Shingles Vaccine  (Series Information)  Completed  03/10/2020  Imm Admin: Zoster Recombinant    11/22/2019  Imm Admin: Zoster Recombinant      HIV Screening  Completed  11/24/2020  HIV 1/2 Ag/Ab (4th Gen)      Influenza Vaccine  (Series Information)  Completed  11/03/2022  Imm Admin: Influenza - Quadrivalent - MDCK - PF    10/22/2021  Imm Admin: Influenza - Quadrivalent - PF *Preferred* (6 months and older)    09/11/2020  Imm Admin: Influenza - Quadrivalent - PF *Preferred* (6 months and older)    10/14/2019  Imm Admin: Influenza - Quadrivalent - PF *Preferred* (6 months and older)    10/11/2018  Imm Admin: Influenza - Quadrivalent - PF *Preferred* (6 months and older)    View More History         Type 2 diabetes mellitus without complication, without long-term current use of insulin  Continue current medication(s).  Follow up in 6 months.  -     metFORMIN (GLUCOPHAGE-XR) 500 MG ER 24hr tablet; Take 2 tablets (1,000 mg total) by mouth daily with breakfast.  Dispense: 180 tablet; Refill: 1  -     Microalbumin/Creatinine Ratio, Urine    Hyperlipidemia associated with type 2 diabetes mellitus  Continue current medication(s).   Follow up in 6 months.  -     rosuvastatin (CRESTOR) 10 MG tablet; Take 1 tablet (10 mg total) by mouth once daily.  Dispense: 90 each; Refill: 1    Hypertension associated with type 2 diabetes mellitus  Continue current medication(s).  Follow up in 6 months.  -     amLODIPine (NORVASC) 5 MG tablet; Take 1 tablet (5 mg total) by mouth once daily.  Dispense: 90 tablet; Refill: 1  -     lisinopriL-hydrochlorothiazide (PRINZIDE,ZESTORETIC) 20-25 mg Tab; Take 1 tablet by mouth once daily.  Dispense: 90 tablet; Refill: 1    Class 2 severe obesity due to excess calories with serious comorbidity and body mass index (BMI) of 38.0 to 38.9 in adult  Work on diet    NAFLD (nonalcoholic fatty liver disease)  Work on weight loss    Acquired hypothyroidism  Continue current medication(s).  Follow up in 6 months.  -     levothyroxine (SYNTHROID) 112 MCG tablet; TAKE 1 TABLET BY MOUTH DAILY BEFORE AND BREAKFAST  Dispense: 90 tablet; Refill: 1  -     TSH; Future; Expected date: 06/15/2023    KELLY on CPAP  Noncompliant with CPAP    Bilateral foot pain  Schedule appt with Podiatry to further evaluate.  -     Ambulatory referral/consult to Podiatry; Future; Expected date: 06/22/2023    Gait abnormality  -     Ambulatory referral/consult to Podiatry; Future; Expected date: 06/22/2023        Follow up in about 6 months (around 12/15/2023) for labs and follow up .     Patient's Medications   New Prescriptions    No medications on file   Previous Medications    ASPIRIN (ECOTRIN) 81 MG EC TABLET    Take 81 mg by mouth once daily.    DUOBRII 0.01-0.045 % LOTN    Apply topically.   Modified Medications    Modified Medication Previous Medication    AMLODIPINE (NORVASC) 5 MG TABLET amLODIPine (NORVASC) 5 MG tablet       Take 1 tablet (5 mg total) by mouth once daily.    Take 1 tablet (5 mg total) by mouth once daily.    LEVOTHYROXINE (SYNTHROID) 112 MCG TABLET levothyroxine (SYNTHROID) 112 MCG tablet       TAKE 1 TABLET BY MOUTH DAILY BEFORE AND  BREAKFAST    TAKE 1 TABLET BY MOUTH DAILY BEFORE AND BREAKFAST    LISINOPRIL-HYDROCHLOROTHIAZIDE (PRINZIDE,ZESTORETIC) 20-25 MG TAB lisinopriL-hydrochlorothiazide (PRINZIDE,ZESTORETIC) 20-25 mg Tab       Take 1 tablet by mouth once daily.    Take 1 tablet by mouth once daily.    METFORMIN (GLUCOPHAGE-XR) 500 MG ER 24HR TABLET metFORMIN (GLUCOPHAGE-XR) 500 MG ER 24hr tablet       Take 2 tablets (1,000 mg total) by mouth daily with breakfast.    Take 2 tablets (1,000 mg total) by mouth daily with breakfast.    ROSUVASTATIN (CRESTOR) 10 MG TABLET rosuvastatin (CRESTOR) 10 MG tablet       Take 1 tablet (10 mg total) by mouth once daily.    Take 1 tablet (10 mg total) by mouth once daily.   Discontinued Medications    No medications on file       Past Medical History:   Diagnosis Date    H/O mammogram 3/31/2016    has done at [facility]    Hyperlipidemia 2017    Hypertension     Hyperthyroidism     Hypothyroidism     IFG (impaired fasting glucose)     NAFLD (nonalcoholic fatty liver disease)     KELLY (obstructive sleep apnea)     on CPAP    KELLY on CPAP     Screening for colorectal cancer 2016    Type 2 diabetes mellitus without complication, without long-term current use of insulin 2019       Past Surgical History:   Procedure Laterality Date     SECTION      COLONOSCOPY N/A 2016    Procedure: COLONOSCOPY-Miralax split prep ;  Surgeon: Cali Oliveira Jr., MD;  Location: [facility];  Service: Endoscopy;  Laterality: N/A;    COLONOSCOPY N/A 2022    Procedure: COLONOSCOPY;  Surgeon: Eli Kong MD;  Location: [facility] ENDO;  Service: Endoscopy;  Laterality: N/A;    DILATION AND CURETTAGE OF UTERUS         Family History   Problem Relation Age of Onset    Dementia Mother     Parkinsonism Mother         PASSED AGE 78    Hypertension Father     Cancer Father         KIDNEY PASSED AGE 70    Heart disease Father 50        HEART ATTACK    Diabetes Father     Hypertension Sister     Diabetes Brother      Hypertension Brother     Diabetes Sister     Hypertension Sister     Hypertension Sister     Hypertension Brother     No Known Problems Daughter     No Known Problems Son     No Known Problems Son     Breast cancer Neg Hx     Colon cancer Neg Hx     Ovarian cancer Neg Hx        Social History     Socioeconomic History    Marital status:    Tobacco Use    Smoking status: Former     Packs/day: 0.10     Years: 8.00     Pack years: 0.80     Types: Cigarettes     Quit date:      Years since quittin.4     Passive exposure: Never    Smokeless tobacco: Never    Tobacco comments:     socially only for around 20 years but not every day   Substance and Sexual Activity    Alcohol use: Yes     Comment: rare    Drug use: No    Sexual activity: Not Currently     Birth control/protection: Post-menopausal

## 2023-07-11 DIAGNOSIS — M79.672 FOOT PAIN, BILATERAL: Primary | ICD-10-CM

## 2023-07-11 DIAGNOSIS — M79.671 FOOT PAIN, BILATERAL: Primary | ICD-10-CM

## 2023-07-12 ENCOUNTER — OFFICE VISIT (OUTPATIENT)
Dept: PODIATRY | Facility: CLINIC | Age: 63
End: 2023-07-12
Payer: COMMERCIAL

## 2023-07-12 VITALS
BODY MASS INDEX: 38.93 KG/M2 | WEIGHT: 206 LBS | SYSTOLIC BLOOD PRESSURE: 96 MMHG | HEART RATE: 97 BPM | DIASTOLIC BLOOD PRESSURE: 66 MMHG

## 2023-07-12 DIAGNOSIS — M25.572 SINUS TARSI SYNDROME OF BOTH ANKLES: ICD-10-CM

## 2023-07-12 DIAGNOSIS — R26.9 GAIT ABNORMALITY: ICD-10-CM

## 2023-07-12 DIAGNOSIS — M79.672 BILATERAL FOOT PAIN: ICD-10-CM

## 2023-07-12 DIAGNOSIS — M76.821 POSTERIOR TIBIAL TENDON DYSFUNCTION (PTTD) OF BOTH LOWER EXTREMITIES: Primary | ICD-10-CM

## 2023-07-12 DIAGNOSIS — M25.571 SINUS TARSI SYNDROME OF BOTH ANKLES: ICD-10-CM

## 2023-07-12 DIAGNOSIS — M79.671 BILATERAL FOOT PAIN: ICD-10-CM

## 2023-07-12 DIAGNOSIS — M76.822 POSTERIOR TIBIAL TENDON DYSFUNCTION (PTTD) OF BOTH LOWER EXTREMITIES: Primary | ICD-10-CM

## 2023-07-12 PROCEDURE — 99999 PR PBB SHADOW E&M-EST. PATIENT-LVL IV: CPT | Mod: PBBFAC,,, | Performed by: PODIATRIST

## 2023-07-12 PROCEDURE — 20605 DRAIN/INJ JOINT/BURSA W/O US: CPT | Mod: LT,S$GLB,, | Performed by: PODIATRIST

## 2023-07-12 PROCEDURE — 99213 PR OFFICE/OUTPT VISIT, EST, LEVL III, 20-29 MIN: ICD-10-PCS | Mod: 25,S$GLB,, | Performed by: PODIATRIST

## 2023-07-12 PROCEDURE — 3074F SYST BP LT 130 MM HG: CPT | Mod: CPTII,S$GLB,, | Performed by: PODIATRIST

## 2023-07-12 PROCEDURE — 3061F PR NEG MICROALBUMINURIA RESULT DOCUMENTED/REVIEW: ICD-10-PCS | Mod: CPTII,S$GLB,, | Performed by: PODIATRIST

## 2023-07-12 PROCEDURE — 3044F PR MOST RECENT HEMOGLOBIN A1C LEVEL <7.0%: ICD-10-PCS | Mod: CPTII,S$GLB,, | Performed by: PODIATRIST

## 2023-07-12 PROCEDURE — 3078F DIAST BP <80 MM HG: CPT | Mod: CPTII,S$GLB,, | Performed by: PODIATRIST

## 2023-07-12 PROCEDURE — 99213 OFFICE O/P EST LOW 20 MIN: CPT | Mod: 25,S$GLB,, | Performed by: PODIATRIST

## 2023-07-12 PROCEDURE — 99999 PR PBB SHADOW E&M-EST. PATIENT-LVL IV: ICD-10-PCS | Mod: PBBFAC,,, | Performed by: PODIATRIST

## 2023-07-12 PROCEDURE — 3066F PR DOCUMENTATION OF TREATMENT FOR NEPHROPATHY: ICD-10-PCS | Mod: CPTII,S$GLB,, | Performed by: PODIATRIST

## 2023-07-12 PROCEDURE — 1159F PR MEDICATION LIST DOCUMENTED IN MEDICAL RECORD: ICD-10-PCS | Mod: CPTII,S$GLB,, | Performed by: PODIATRIST

## 2023-07-12 PROCEDURE — 3074F PR MOST RECENT SYSTOLIC BLOOD PRESSURE < 130 MM HG: ICD-10-PCS | Mod: CPTII,S$GLB,, | Performed by: PODIATRIST

## 2023-07-12 PROCEDURE — 1159F MED LIST DOCD IN RCRD: CPT | Mod: CPTII,S$GLB,, | Performed by: PODIATRIST

## 2023-07-12 PROCEDURE — 1160F RVW MEDS BY RX/DR IN RCRD: CPT | Mod: CPTII,S$GLB,, | Performed by: PODIATRIST

## 2023-07-12 PROCEDURE — 3078F PR MOST RECENT DIASTOLIC BLOOD PRESSURE < 80 MM HG: ICD-10-PCS | Mod: CPTII,S$GLB,, | Performed by: PODIATRIST

## 2023-07-12 PROCEDURE — 3008F PR BODY MASS INDEX (BMI) DOCUMENTED: ICD-10-PCS | Mod: CPTII,S$GLB,, | Performed by: PODIATRIST

## 2023-07-12 PROCEDURE — 20605 INTERMEDIATE JOINT ASPIRATION/INJECTION: ICD-10-PCS | Mod: LT,S$GLB,, | Performed by: PODIATRIST

## 2023-07-12 PROCEDURE — 4010F PR ACE/ARB THEARPY RXD/TAKEN: ICD-10-PCS | Mod: CPTII,S$GLB,, | Performed by: PODIATRIST

## 2023-07-12 PROCEDURE — 4010F ACE/ARB THERAPY RXD/TAKEN: CPT | Mod: CPTII,S$GLB,, | Performed by: PODIATRIST

## 2023-07-12 PROCEDURE — 3066F NEPHROPATHY DOC TX: CPT | Mod: CPTII,S$GLB,, | Performed by: PODIATRIST

## 2023-07-12 PROCEDURE — 1160F PR REVIEW ALL MEDS BY PRESCRIBER/CLIN PHARMACIST DOCUMENTED: ICD-10-PCS | Mod: CPTII,S$GLB,, | Performed by: PODIATRIST

## 2023-07-12 PROCEDURE — 3044F HG A1C LEVEL LT 7.0%: CPT | Mod: CPTII,S$GLB,, | Performed by: PODIATRIST

## 2023-07-12 PROCEDURE — 3008F BODY MASS INDEX DOCD: CPT | Mod: CPTII,S$GLB,, | Performed by: PODIATRIST

## 2023-07-12 PROCEDURE — 3061F NEG MICROALBUMINURIA REV: CPT | Mod: CPTII,S$GLB,, | Performed by: PODIATRIST

## 2023-07-12 RX ORDER — DEXAMETHASONE SODIUM PHOSPHATE 4 MG/ML
4 INJECTION, SOLUTION INTRA-ARTICULAR; INTRALESIONAL; INTRAMUSCULAR; INTRAVENOUS; SOFT TISSUE
Status: DISCONTINUED | OUTPATIENT
Start: 2023-07-12 | End: 2023-07-12 | Stop reason: HOSPADM

## 2023-07-12 RX ADMIN — DEXAMETHASONE SODIUM PHOSPHATE 4 MG: 4 INJECTION, SOLUTION INTRA-ARTICULAR; INTRALESIONAL; INTRAMUSCULAR; INTRAVENOUS; SOFT TISSUE at 02:07

## 2023-07-12 NOTE — PATIENT INSTRUCTIONS
Posterior Tibial Tendon Dysfunction (PTTD)      What Is PTTD?   Posterior tibial tendon dysfunction (PTTD)  The posterior tibial tendon serves as one of the major supporting structures of the foot, helping it to function while walking. Posterior tibial tendon dysfunction (PTTD) is a condition caused by changes in the tendon, impairing its ability to support the arch. This results in flattening of the foot.              PTTD is often called adult acquired flatfoot because it is the most common type of flatfoot developed during adulthood. Although this condition typically occurs in only one foot, some people may develop it in both feet. PTTD is usually progressive, which means it will keep getting worse, especially if it is not treated early.    Causes  Overuse of the posterior tibial tendon is often the cause of PTTD. In fact, the symptoms usually occur after activities that involve the tendon, such as running, walking, hiking or climbing stairs.        Symptoms  The symptoms of PTTD may include pain, swelling, a flattening of the arch and an inward rolling of the ankle. As the condition progresses, the symptoms will change.    For example, when PTTD initially develops, there is pain on the inside of the foot and ankle (along the course of the tendon). In addition, the area may be red, warm and swollen.    Later, as the arch begins to flatten, there may still be pain on the inside of the foot and ankle. But at this point, the foot and toes begin to turn outward and the ankle rolls inward.    As PTTD becomes more advanced, the arch flattens even more and the pain often shifts to the outside of the foot, below the ankle. The tendon has deteriorated considerably, and arthritis often develops in the foot. In more severe cases, arthritis may also develop in the ankle.    Nonsurgical Treatment  Because of the progressive nature of PTTD, early treatment is advised. If treated early enough, your symptoms may resolve without  the need for surgery, and progression of your condition can be arrested.    In contrast, untreated PTTD could leave you with an extremely flat foot, painful arthritis in the foot and ankle and increasing limitations on walking, running or other activities.    In many cases of PTTD, treatment can begin with nonsurgical approaches that may include:    Orthotic devices or bracing. To give your arch the support it needs, your foot and ankle surgeon may provide you with an ankle brace or a custom orthotic device that fits into the shoe.  Immobilization. Sometimes a short-leg cast or boot is worn to immobilize the foot and allow the tendon to heal, or you may need to completely avoid all weightbearing for a while.  Physical therapy. Ultrasound therapy and exercises may help rehabilitate the tendon and muscle following immobilization.  Medications. Nonsteroidal anti-inflammatory drugs (NSAIDs), such as ibuprofen, help reduce the pain and inflammation.  Shoe modifications. Your foot and ankle surgeon may advise changes to your shoes and may provide special inserts designed to improve arch support.     When Is Surgery Needed?  In cases of PTTD that have progressed substantially or have failed to improve with nonsurgical treatment, surgery may be required. For some advanced cases, surgery may be the only option. Your foot and ankle surgeon will determine the best approach for you.      Understanding Posterior Tibialis Tenosynovitis    The posterior tibialis tendon runs along the inside of the foot. It connects the calf muscle (posterior tibialis muscle) to bones on the inside of the foot. It helps maintain the arch of the foot. It also gives you stability when you move. Posterior tibialis tenosynovitis is when this tendon becomes inflamed or torn.     How to say it  [id number]   What causes posterior tibialis tenosynovitis?  This condition is often caused by an injury to the tendon. For instance, a fall  can tear the tendon. Overuse can also damage it. People who play sports like basketball or tennis a lot may weaken the tendon over time. Flat feet can also be a contributing factor to developing this condition.  Symptoms of posterior tibialis tenosynovitis  The symptoms of this condition include pain and swelling. The pain is usually felt near the tendon, on the inside of the foot and ankle. It often gets worse over time or with an increase in activity. Your arch may eventually fall, leading to a flat foot. Your foot may also start to turn outward.  Treatment for posterior tibialis tenosynovitis  Treatment for this condition depends on the severity of the tear. Treatment may include:  Rest. You should avoid any activities that cause pain and swelling. You may also need to limit the amount of weight you put on your injured foot.  Cold packs. Putting a cold pack on the tendon may reduce pain and swelling.  Medicine. Over-the-counter pain medicines can reduce pain and swelling.  Leg cast or walking boot. Severe tears of the posterior tibialis tendon may need to be kept from moving. These devices can help protect the tendon and reduce swelling.  Shoe insert or brace. Like a leg cast or walking boot, these devices can help protect the tendon as it heals. They may also ease pain.  Strengthening and stretching exercises. Certain exercises can help you regain strength and flexibility in your foot..  Surgery. Several surgical choices are available to fix the torn tendon or replace it. But you often do not need surgery unless your symptoms do not get better after trying other treatments for at least 6 months.     When to call your healthcare provider  Call your healthcare provider right away if you have any of these:  Fever of 100.4°F (38°C) or higher, or as directed  Pain that gets worse  Symptoms that dont get better, or get worse  New symptoms    Date Last Reviewed: 3/10/2016  © 7488-0366 [employer] [address]  [place] [address]. All rights reserved. This information is not intended as a substitute for professional medical care. Always follow your healthcare professional's instructions.      Flexible Flatfoot        What Is Flatfoot?    Flatfoot entails partial or total collapse (loss) of the arch  Flatfoot is often a complex disorder, with diverse symptoms and varying degrees of deformity and disability. There are several types of flatfoot, all of which have one characteristic in common: partial or total collapse (loss) of the arch. Other characteristics shared by most types of flatfoot include toe drift, in which the toes and front part of the foot point outward. The heel tilts toward the outside and the ankle appears to turn in. A tight Achilles tendon, which causes the heel to lift off the ground earlier when walking and may make the problem worse, bunions and hammertoes may develop as a result of a flatfoot.    Flexible Flatfoot  Normal foot with archFlexible flatfoot is one of the most common types of flatfoot. It typically begins in childhood or adolescence and continues into adulthood. It usually occurs in both feet and progresses in severity throughout the adult years. As the deformity worsens, the soft tissues (tendons and ligaments) of the arch may stretch or tear and can become inflamed. The term flexible means that while the foot is flat when standing (weightbearing), the arch returns when not standing.                  Symptoms  Symptoms that may occur in some persons with flexible flatfoot include:    Pain in the heel, arch, ankle or along the outside of the foot  Rolled-in ankle (overpronation)  Pain along the shin bone (shin splint)  General aching or fatigue in the foot or leg  Low back, hip or knee pain     Diagnosis  In diagnosing flatfoot, the foot and ankle surgeon examines the foot and observes how it looks when you stand and sit. X-rays are usually taken to determine the  disorder's severity. If you are diagnosed with flexible flatfoot, but you do not have any symptoms, your surgeon will explain what you might expect in the future.    Nonsurgical Treatment  If you experience symptoms with flexible flatfoot, the surgeon may recommend nonsurgical treatment options, including:    Activity modifications. Cut down on activities that bring you pain and avoid prolonged walking and standing to give your arches a rest.  Weight loss. If you are overweight, try to lose weight. Putting too much weight on your arches may aggravate your symptoms.  Orthotic devices. Your foot and ankle surgeon can provide you with custom orthotic devices for your shoes to give more support to the arches.  Immobilization. In some cases, it may be necessary to use a walking cast or to completely avoid weightbearing.  Medications. Nonsteroidal anti-inflammatory drugs (NSAIDs), such as ibuprofen, help reduce pain and inflammation.  Physical therapy. Ultrasound therapy or other physical therapy modalities may be used to provide temporary relief.  Shoe modifications. Wearing shoes that support the arches is important for anyone who has flatfoot.   Ankle Foot Orthoses (AFO) devices. Your foot and ankle surgeon may recommend advanced bracing to modify your walking and to support your arches.     When Is Surgery Necessary?  In some patients whose pain is not adequately relieved by other treatments, surgery may be considered. Many surgical techniques are available to correct flexible flatfoot, and one or a combination of procedures may be required to relieve the symptoms and improve foot function. In selecting the procedure or combination of procedures for your particular case, the foot and ankle surgeon will take into consideration the extent of your deformity based on the x-ray findings, your age, your activity level and other factors. The length of the recovery period will vary depending on the procedure or procedures  performed.        Recommended [facility] orthotics:  -powerstep  -superfeet    Recommended shoegear:  -new balance  -ascics  -ochoao  -gonzalez      Equinus          What Is Equinus?    Equinus is a condition in which the upward bending motion of the ankle joint is limited. Someone with equinus lacks the flexibility to bring the top of the foot toward the front of the leg. Equinus can occur in one or both feet. When it involves both feet, the limitation of motion is sometimes worse in one foot than in the other.    People with equinus develop ways to compensate for their limited ankle motion, and this often leads to other foot, leg or back problems. The most common methods of compensation are flattening of the arch or picking up the heel early when walking, placing increased pressure on the ball of the foot. Other patients compensate by toe walking, while a smaller number take steps by bending abnormally at the hip or knee.    Causes  There are several possible causes for the limited range of ankle motion. Often, it is due to tightness in the Achilles tendon or calf muscles (the soleus muscle and/or gastrocnemius muscle). In some patients, this tightness is congenital (present at birth), and sometimes it is an inherited trait. Other patients acquire the tightness from being in a cast, being on crutches or frequently wearing high-heeled shoes. In addition, diabetes can affect the fibers of the Achilles tendon and cause tightness. Sometimes equinus is related to a bone blocking the ankle motion. For example, a fragment of a broken bone following an ankle injury, or bone block, can get in the way and restrict motion. Equinus may also result from one leg being shorter than the other. Less often, equinus is caused by spasms in the calf muscle. These spasms may be signs of an underlying neurologic disorder.      Foot Problems Related to Equinus  Depending on how a patient compensates for the inability to bend properly at the  ankle, a variety of foot conditions can develop, including:    Plantar fasciitis (arch/heel pain)  Calf cramping  Tendonitis (inflammation in the Achilles tendon)  Metatarsalgia (pain and/or callusing on the ball of the foot)  Flatfoot  Arthritis of the midfoot (middle area of the foot)  Pressure sores on the ball of the foot or the arch  Bunions and hammertoes  Ankle pain  Shin splints     Diagnosis  Most patients with equinus are unaware they have this condition when they first visit the doctor. Instead, they come to the doctor seeking relief for foot problems associated with equinus.    To diagnose equinus, the foot and ankle surgeon will evaluate the ankle's range of motion when the knee is flexed (bent) as well as extended (straightened). This enables the surgeon to identify whether the tendon or muscle is tight and to assess whether bone is interfering with ankle motion. X-rays may also be ordered. In some cases, the foot and ankle surgeon may refer the patient for neurologic evaluation.    Nonsurgical Treatment  Treatment includes strategies aimed at relieving the symptoms and conditions associated with equinus. In addition, the patient is treated for the equinus itself through one or more of the following options:    Night splint. The foot may be placed in a splint at night to keep it in a position that helps reduce tightness of the calf muscle.  Heel lifts. Placing heel lifts inside the shoes or wearing shoes with a moderate heel takes stress off the Achilles tendon when walking and may reduce symptoms.  Arch supports or orthotic devices. Custom orthotic devices that fit into the shoe are often prescribed to keep weight distributed properly and to help control muscle/tendon imbalance.  Physical therapy. To help remedy muscle tightness, exercises that stretch the calf muscle(s) are recommended.    When Is Surgery Needed?  In some cases, surgery may be needed to correct the cause of equinus if it is related  to a tight tendon or a bone blocking the ankle motion. The foot and ankle surgeon will determine the type of procedure that is best suited to the individual patient.    What Is a Gastrocnemius Release?  The gastrocnemius (gastroc) and the soleus are two muscles that make up the calf. The gastroc is the larger and more superficial of the two muscles. The soleus is a deeper muscle within the lower leg. The gastroc tendon combines with the soleus tendon to form the Achilles tendon.  Tightness in the calf can limit how for the ankle can flex up. This may make it difficult to walk with the heel on the floor. Over time this can cause problems such as pain and deformity. Calf tightness may contribute to many foot problems, including heel pain, Achilles tendon pain, flatfoot deformity, toe pain, and bunions.  A gastrocnemius release lengthens the gastrocnemius tendon. This is done to increase the flexibility of the calf muscle, which can decrease pressure at the front of the foot, improve function, and decrease deformity.    Diagnosis  This surgery is done in patients with tightness of the gastroc that has not improved with stretching exercises. This procedure can be combined with other reconstructive procedures or be performed by itself.  Surgery can be avoided when appropriate range of motion and flexibility can be obtained with conservative treatment (stretching). It should also be avoided if there are contractures of multiple tendons in the leg, and not just the gastroc.     Treatment  The surgery can be performed through several different incisions. Most commonly, a small incision is made on the inner side of the lower leg. Sometimes an incision directly in the back of the calf is used, or even an endoscopic incision, which is about ½ inch. Once the gastroc tendon is identified, it is  from the underlying muscle belly of the soleus, then cut straight across. Once the tendon is released, the ankle is flexed up  and an increased range of motion is noted intra-operatively.     Recovery  For the first two weeks after surgery, the patient typically is immobilized in a splint or boot. It is important to keep the ankle in a proper position while the tendon is healing. A cramping feeling in the back of the calf is normal. Gentle range of motion and stretching begin once the ankle is removed from the splint/boot. Timing can vary depending upon what other procedures are done.     Risks and Complications  After a gastroc release, some patients experience nerve injury that results in irritation or numbness over the outside of the heel. This usually is temporary. In addition, some patients may notice a difference in the appearance of one calf compared to the other and temporary calf weakness.    FAQs  Why are my calf muscles tight?   Most frequently a tight calf muscle is an inherited problem that only causes problems later in life. Other reasons for calf tightness are nerve injuries, muscle problems, and other medical problems like stroke and diabetes. People can also get tight calf muscles after trauma to the leg, ankle, or foot.    Will a gastrocnemius lengthening affect my strength or ability to walk?  This procedure will cause some weakness but most patients will not notice it. Some patients may have a subtle limp, but this typically resolves within six months of surgery.

## 2023-07-12 NOTE — PROGRESS NOTES
[facility] - PODIATRY  [address] [place] [address], [address]  [facility] [address]  Dept: [phone]  Dept Fax: [phone]    Samuel Zapata Jr., DPM     Assessment:   MDM    Coding  1. Posterior tibial tendon dysfunction (PTTD) of both lower extremities        2. Bilateral foot pain  Ambulatory referral/consult to Podiatry      3. Sinus tarsi syndrome of both ankles  Intermediate Joint Aspiration/Injection    Intermediate Joint Aspiration/Injection      4. Gait abnormality  Ambulatory referral/consult to Podiatry          Plan:     Intermediate Joint Aspiration/Injection    Date/Time: 7/12/2023 2:30 PM  Performed by: Samuel Zapata Jr., DPM  Authorized by: Samuel Zapata Jr., DPM     Consent Done?:  Yes (Verbal)  Indications:  Pain and joint swelling  Site marked: The procedure site was marked    Timeout: Prior to procedure the correct patient, procedure, and site was verified      Location:  Ankle  Ankle joint: R STJ.  Prep: Patient was prepped and draped in usual sterile fashion    Ultrasonic Guidance for needle placement: No  Needle size:  25 G  Approach:  Anterolateral  Medications:  4 mg dexAMETHasone 4 mg/mL  Patient tolerance:  Patient tolerated the procedure well with no immediate complications    Intermediate Joint Aspiration/Injection    Date/Time: 7/12/2023 2:30 PM  Performed by: Samuel Zapata Jr., DPM  Authorized by: Samuel Zapata Jr., DPM     Consent Done?:  Yes (Verbal)  Indications:  Pain and joint swelling  Site marked: The procedure site was marked    Timeout: Prior to procedure the correct patient, procedure, and site was verified      Location:  Ankle  Ankle joint: L STJ.  Prep: Patient was prepped and draped in usual sterile fashion    Ultrasonic Guidance for needle placement: No  Needle size:  25 G  Medications:  4 mg dexAMETHasone 4 mg/mL  Patient tolerance:  Patient tolerated the procedure well with no immediate complications      Digna was seen today for foot  pain.    Diagnoses and all orders for this visit:    Posterior tibial tendon dysfunction (PTTD) of both lower extremities    Bilateral foot pain  -     Ambulatory referral/consult to Podiatry    Sinus tarsi syndrome of both ankles  -     Intermediate Joint Aspiration/Injection  -     Intermediate Joint Aspiration/Injection    Gait abnormality  -     Ambulatory referral/consult to Podiatry        -pt seen, evaluated, and managed  -dx discussed in detail. All questions/concerns addressed  -all tx options discussed. All alternatives, risks, benefits of all txs discussed  -the patient was educated about the diagnosis  -We discussed conservative care options possible including but not limited to shoe wear and/or padding, bracing/strapping, at home ROM, formal PT, medical therapy, injection therapy  - The utilization of NSAIDs can be considered but their benefit has to be tempered against the risk of GI/ concerns  - A steroid injection can be undertaken.  We did discuss the potential mechanism of action of this shot.  Understanding that multiple injections at the same anatomic site do have deleterious effects on the soft tissue.  Generic risks include: steroid flare (advised to ice if necessary), skin hypo-pgimentation (which can be permanent and unsightly), elevation of blood sugar, subcutaneous atrophy (can be permanent) and infection.   -XR/imaging reviewed by me: agree with read  -labs reviewed by me: ok for vgel  -implemented icing/stretching regimen  - ASO  dispensed   -calc axial and ankle XRs nxt visit if not improved    -rxs dispensed: none  -referrals: none  -WB: wbat      Follow up in about 8 weeks (around 9/6/2023).    Subjective:      Patient ID: Digna Bhatia is a 62 y.o. female.    Chief Complaint:   Chief Complaint   Patient presents with    Foot Pain     Bilateral foot pain       CC - foot pain: patient presents to the podiatry clinic  with complaint of  bilateral foot pain. Onset of the symptoms was  several years ago. Precipitating event: unk. Current symptoms include: ability to bear weight, but with some pain, stiffness, swelling and worsening symptoms after a period of activity. Aggravating factors: walking and certain shoegear. Symptoms have gradually worsened. Patient has had no prior foot problems. Evaluation to date: none. Treatment to date: avoidance of offending activity. Patients rates pain 7/10 on pain scale. Has trouble with strenuous physical activity due to pain.        Foot Pain      Last Podiatry Enc: Visit date not found  Last Enc w/ Me: Visit date not found    Outside reports reviewed: historical medical records.  Family hx: as below  Past Medical History:   Diagnosis Date    H/O mammogram 3/31/2016    has done [facility]    Hyperlipidemia 2017    Hypertension     Hyperthyroidism     Hypothyroidism     IFG (impaired fasting glucose)     NAFLD (nonalcoholic fatty liver disease)     KELLY (obstructive sleep apnea)     on CPAP    KELLY on CPAP     Screening for colorectal cancer 2016    Type 2 diabetes mellitus without complication, without long-term current use of insulin 2019     Past Surgical History:   Procedure Laterality Date     SECTION      COLONOSCOPY N/A 2016    Procedure: COLONOSCOPY-Miralax split prep ;  Surgeon: Cali Oliveira Jr., MD;  Location: [facility] ENDO;  Service: Endoscopy;  Laterality: N/A;    COLONOSCOPY N/A 2022    Procedure: COLONOSCOPY;  Surgeon: Eli Kong MD;  Location: [facility] ENDO;  Service: Endoscopy;  Laterality: N/A;    DILATION AND CURETTAGE OF UTERUS       Family History   Problem Relation Age of Onset    Dementia Mother     Parkinsonism Mother         PASSED AGE 78    Hypertension Father     Cancer Father         KIDNEY PASSED AGE 70    Heart disease Father 50        HEART ATTACK    Diabetes Father     Hypertension Sister     Diabetes Brother     Hypertension Brother     Diabetes Sister     Hypertension Sister     Hypertension  Sister     Hypertension Brother     No Known Problems Daughter     No Known Problems Son     No Known Problems Son     Breast cancer Neg Hx     Colon cancer Neg Hx     Ovarian cancer Neg Hx      Current Outpatient Medications   Medication Sig Dispense Refill    amLODIPine (NORVASC) 5 MG tablet Take 1 tablet (5 mg total) by mouth once daily. 90 tablet 1    aspirin (ECOTRIN) 81 MG EC tablet Take 81 mg by mouth once daily.      DUOBRII 0.01-0.045 % Lotn Apply topically.      levothyroxine (SYNTHROID) 112 MCG tablet TAKE 1 TABLET BY MOUTH DAILY BEFORE AND BREAKFAST 90 tablet 1    lisinopriL-hydrochlorothiazide (PRINZIDE,ZESTORETIC) 20-25 mg Tab Take 1 tablet by mouth once daily. 90 tablet 1    metFORMIN (GLUCOPHAGE-XR) 500 MG ER 24hr tablet Take 2 tablets (1,000 mg total) by mouth daily with breakfast. 180 tablet 1    rosuvastatin (CRESTOR) 10 MG tablet Take 1 tablet (10 mg total) by mouth once daily. 90 each 1     No current facility-administered medications for this visit.     Review of patient's allergies indicates:   Allergen Reactions    Bactrim [sulfamethoxazole-trimethoprim] Rash    Pcn [penicillins] Rash     Social History     Socioeconomic History    Marital status:    Tobacco Use    Smoking status: Former     Packs/day: 0.10     Years: 8.00     Pack years: 0.80     Types: Cigarettes     Quit date:      Years since quittin.5     Passive exposure: Never    Smokeless tobacco: Never    Tobacco comments:     socially only for around 20 years but not every day   Substance and Sexual Activity    Alcohol use: Yes     Comment: rare    Drug use: No    Sexual activity: Not Currently     Birth control/protection: Post-menopausal       ROS    REVIEW OF SYSTEMS: Negative as documented below as well as positive findings in bold.       Constitutional  Respiratory  Gastrointestinal  Skin   - Fever - Cough - Heartburn - Rash   - Chills - Spit blood - Nausea - Itching   - Weight Loss - Shortness of breath -  Vomiting - Nail pain   - Malaise/Fatigue - Wheezing - Abdominal Pain  Wound/Ulcer   - Weight Gain   - Blood in Stool  Poor wound healing       - Diarrhea          Cardiovascular  Genitourinary  Neurological  HEENT   - Chest Pain - Dysuria - Burning Sensation of feet - Headache   - Palpitations - Hematuria - Tingling / Paresthesia - Congestion   - Pain at night in legs - Flank Pain - Dizziness - Sore Throat   - Cramping   - Tremor - Blurred Vision   - Leg Swelling   - Sensory Change - Double Vision   - Dizzy when standing   - Speech Change - Eye Redness       - Focal Weakness - Dry Eyes       - Loss of Consciousness          Endocrine  Musculoskeletal  Psychiatric   - Cold intolerance - Muscle Pain - Depression   - Heat intolerance - Neck Pain - Insomnia   - Anemia - Joint Pain - Memory Loss   -  Easy bruising, bleeding - Heel pain - Anxiety      Toe Pain        Leg/Ankle/Foot Pain         Objective:     BP 96/66   Pulse 97   Wt 93.5 kg (206 lb 0.3 oz)   LMP  (LMP Unknown)   BMI 38.93 kg/m²   Vitals:    07/12/23 1404   BP: 96/66   Pulse: 97   Weight: 93.5 kg (206 lb 0.3 oz)   PainSc:   7       Physical Exam    General Appearance:   Patient appears well developed, well nourished  Patient appears stated age    Psychiatric:   Patient is oriented to time, place, and person.  Patient has appropriate mood and affect    Neck:  Trachea Midline  No visible masses    Respiratory/Ears:  No distress or labored breathing.  Able to differentiate between normal talking voice and whisper.  Able to follow commands    Eyes:  Visual Acuity intact  Lids and conjunctivae normal. No discoloration noted.    Foot Exam  Physical Exam  Ortho Exam  Ortho/SPM Exam  Foot/Ankle Musculoskeletal Exam    B/l LE exam con't:  V:  DP 2/4, PT 2/4   CRT< 3s to all digits tested   Tibial and popliteal lymph nodes are w/o abnormality   Edema: absent, varicosities: absent    N:  Patient displays normal ankle reflexes   SILT in SP/DP/T/Ellis/Saph  distributions    Ortho: +Motor EHL/FHL/TA/GA   +TTP b/l PTT insertion   +TTP b/l sinus tarsi   Compartments soft/compressible. No pain on passive stretch of big toe. No calf Pain.   collapsing medial longitudinal arch with wb b/lLE   +too many toes sign b/lLe   B/l equinus deformity present    Derm:  skin intact, skin warm and dry, skin without ulcers or lesions, skin without induration, nails normal, no ecchymosis    Imaging / Labs:      X-Ray Foot Complete 3 view Bilateral    Result Date: 7/12/2023  EXAMINATION: XR FOOT COMPLETE 3 VIEW BILATERAL CLINICAL HISTORY: Pain in right foot TECHNIQUE: AP, lateral, and oblique views of both feet were performed. COMPARISON: None FINDINGS: Right: There is no radiographic evidence of acute osseous, articular, or soft tissue abnormality.  Some minimal degenerative findings present at the 1st MTP joint.No osseous erosions demonstrated. Left: There is no radiographic evidence of acute osseous, articular, or soft tissue abnormality.  There is mild osteoarthritis at the 1st MTP joint.  No osseous erosions demonstrated.     Mild degenerative findings as above Electronically signed by: Daren Morales MD Date:    07/12/2023 Time:    14:19        Note: This was dictated using a computer transcription program. Although proofread, it may contain computer transcription errors and phonetic errors. Other human proofreading errors may also exist. Corrections may be performed at a later time. Please contact us for any clarification if needed.    Samuel Zapata DPM  [facility]

## 2023-07-18 LAB
LEFT EYE DM RETINOPATHY: NEGATIVE
RIGHT EYE DM RETINOPATHY: NEGATIVE

## 2023-07-19 ENCOUNTER — PATIENT MESSAGE (OUTPATIENT)
Dept: PODIATRY | Facility: CLINIC | Age: 63
End: 2023-07-19
Payer: COMMERCIAL

## 2023-07-19 ENCOUNTER — PATIENT MESSAGE (OUTPATIENT)
Dept: FAMILY MEDICINE | Facility: CLINIC | Age: 63
End: 2023-07-19
Payer: COMMERCIAL

## 2023-07-20 NOTE — TELEPHONE ENCOUNTER
Doris - check to see if we have a fax from Dr. Mge Francisco with diabetic eye exam report. If not, please call her office to get diabetic eye report.

## 2023-07-27 ENCOUNTER — PATIENT OUTREACH (OUTPATIENT)
Dept: ADMINISTRATIVE | Facility: HOSPITAL | Age: 63
End: 2023-07-27
Payer: COMMERCIAL

## 2023-10-16 ENCOUNTER — OFFICE VISIT (OUTPATIENT)
Dept: PODIATRY | Facility: CLINIC | Age: 63
End: 2023-10-16
Payer: COMMERCIAL

## 2023-10-16 VITALS — BODY MASS INDEX: 39.13 KG/M2 | HEIGHT: 61 IN | WEIGHT: 207.25 LBS

## 2023-10-16 DIAGNOSIS — M76.821 POSTERIOR TIBIAL TENDON DYSFUNCTION (PTTD) OF BOTH LOWER EXTREMITIES: Primary | ICD-10-CM

## 2023-10-16 DIAGNOSIS — M76.822 POSTERIOR TIBIAL TENDON DYSFUNCTION (PTTD) OF BOTH LOWER EXTREMITIES: Primary | ICD-10-CM

## 2023-10-16 DIAGNOSIS — M25.571 SINUS TARSI SYNDROME OF BOTH ANKLES: ICD-10-CM

## 2023-10-16 DIAGNOSIS — M25.572 SINUS TARSI SYNDROME OF BOTH ANKLES: ICD-10-CM

## 2023-10-16 PROCEDURE — 4010F PR ACE/ARB THEARPY RXD/TAKEN: ICD-10-PCS | Mod: CPTII,S$GLB,, | Performed by: PODIATRIST

## 2023-10-16 PROCEDURE — 99999 PR PBB SHADOW E&M-EST. PATIENT-LVL III: ICD-10-PCS | Mod: PBBFAC,,, | Performed by: PODIATRIST

## 2023-10-16 PROCEDURE — 3061F NEG MICROALBUMINURIA REV: CPT | Mod: CPTII,S$GLB,, | Performed by: PODIATRIST

## 2023-10-16 PROCEDURE — 3044F PR MOST RECENT HEMOGLOBIN A1C LEVEL <7.0%: ICD-10-PCS | Mod: CPTII,S$GLB,, | Performed by: PODIATRIST

## 2023-10-16 PROCEDURE — 3066F PR DOCUMENTATION OF TREATMENT FOR NEPHROPATHY: ICD-10-PCS | Mod: CPTII,S$GLB,, | Performed by: PODIATRIST

## 2023-10-16 PROCEDURE — 3008F PR BODY MASS INDEX (BMI) DOCUMENTED: ICD-10-PCS | Mod: CPTII,S$GLB,, | Performed by: PODIATRIST

## 2023-10-16 PROCEDURE — 1159F PR MEDICATION LIST DOCUMENTED IN MEDICAL RECORD: ICD-10-PCS | Mod: CPTII,S$GLB,, | Performed by: PODIATRIST

## 2023-10-16 PROCEDURE — 1159F MED LIST DOCD IN RCRD: CPT | Mod: CPTII,S$GLB,, | Performed by: PODIATRIST

## 2023-10-16 PROCEDURE — 99999 PR PBB SHADOW E&M-EST. PATIENT-LVL III: CPT | Mod: PBBFAC,,, | Performed by: PODIATRIST

## 2023-10-16 PROCEDURE — 1160F PR REVIEW ALL MEDS BY PRESCRIBER/CLIN PHARMACIST DOCUMENTED: ICD-10-PCS | Mod: CPTII,S$GLB,, | Performed by: PODIATRIST

## 2023-10-16 PROCEDURE — 3044F HG A1C LEVEL LT 7.0%: CPT | Mod: CPTII,S$GLB,, | Performed by: PODIATRIST

## 2023-10-16 PROCEDURE — 99213 OFFICE O/P EST LOW 20 MIN: CPT | Mod: S$GLB,,, | Performed by: PODIATRIST

## 2023-10-16 PROCEDURE — 4010F ACE/ARB THERAPY RXD/TAKEN: CPT | Mod: CPTII,S$GLB,, | Performed by: PODIATRIST

## 2023-10-16 PROCEDURE — 99213 PR OFFICE/OUTPT VISIT, EST, LEVL III, 20-29 MIN: ICD-10-PCS | Mod: S$GLB,,, | Performed by: PODIATRIST

## 2023-10-16 PROCEDURE — 3008F BODY MASS INDEX DOCD: CPT | Mod: CPTII,S$GLB,, | Performed by: PODIATRIST

## 2023-10-16 PROCEDURE — 3066F NEPHROPATHY DOC TX: CPT | Mod: CPTII,S$GLB,, | Performed by: PODIATRIST

## 2023-10-16 PROCEDURE — 3061F PR NEG MICROALBUMINURIA RESULT DOCUMENTED/REVIEW: ICD-10-PCS | Mod: CPTII,S$GLB,, | Performed by: PODIATRIST

## 2023-10-16 PROCEDURE — 1160F RVW MEDS BY RX/DR IN RCRD: CPT | Mod: CPTII,S$GLB,, | Performed by: PODIATRIST

## 2023-10-16 NOTE — PROGRESS NOTES
[facility] - PODIATRY  [address] [place] [address], [address]  [place] [address]  Dept: [phone]  Dept Fax: [phone]    Samuel Zapata Jr., IONA     Assessment:   MDM    Coding  1. Posterior tibial tendon dysfunction (PTTD) of both lower extremities  Ambulatory referral/consult to Physical/Occupational Therapy      2. Sinus tarsi syndrome of both ankles            Plan:     Suyapa Sawyer was seen today for follow-up.    Diagnoses and all orders for this visit:    Posterior tibial tendon dysfunction (PTTD) of both lower extremities  -     Ambulatory referral/consult to Physical/Occupational Therapy; Future    Sinus tarsi syndrome of both ankles        -pt seen, evaluated, and managed  -dx discussed in detail. All questions/concerns addressed  -all tx options discussed. All alternatives, risks, benefits of all txs discussed  -the patient was educated about the diagnosis  -We discussed conservative care options possible including but not limited to shoe wear and/or padding, bracing/strapping, at home ROM, formal PT, medical therapy, injection therapy  - The utilization of NSAIDs can be considered but their benefit has to be tempered against the risk of GI/ concerns  - A steroid injection can be undertaken.  We did discuss the potential mechanism of action of this shot.  Understanding that multiple injections at the same anatomic site do have deleterious effects on the soft tissue.  Generic risks include: steroid flare (advised to ice if necessary), skin hypo-pgimentation (which can be permanent and unsightly), elevation of blood sugar, subcutaneous atrophy (can be permanent) and infection.   -XR/imaging reviewed by me: agree with read  -labs reviewed by me: ok for vgel  -implemented icing/stretching regimen  - ASO  - use PRN  -clinically improving   -calc axial and ankle XRs nxt visit if not improved    -rxs dispensed: none  -referrals: PT  -WB: wbat      Follow up if symptoms worsen or  fail to improve.    Subjective:      Patient ID: Digna Bhatia is a 62 y.o. female.    Chief Complaint:   Chief Complaint   Patient presents with    Follow-up     Bilateral foot pain        CC - foot pain: patient presents to the podiatry clinic  with complaint of  bilateral foot pain. Onset of the symptoms was several years ago. Precipitating event: unk. Current symptoms include: ability to bear weight, but with some pain, stiffness, swelling and worsening symptoms after a period of activity. Aggravating factors: walking and certain shoegear. Symptoms have gradually worsened. Patient has had no prior foot problems. Evaluation to date: none. Treatment to date: avoidance of offending activity. Patients rates pain 7/10 on pain scale. Has trouble with strenuous physical activity due to pain.        10/26/23:  Hx as above. Pain improved but not gone after dex inj last visit.     Foot Pain        Last Podiatry Enc: Visit date not found  Last Enc w/ Me: Visit date not found    Outside reports reviewed: historical medical records.  Family hx: as below  Past Medical History:   Diagnosis Date    H/O mammogram 3/31/2016    [facility] done [facility]    Hyperlipidemia 2017    Hypertension     Hyperthyroidism     Hypothyroidism     IFG (impaired fasting glucose)     NAFLD (nonalcoholic fatty liver disease)     KELLY (obstructive sleep apnea)     on CPAP    KELLY on CPAP     Screening for colorectal cancer 2016    Type 2 diabetes mellitus without complication, without long-term current use of insulin 2019     Past Surgical History:   Procedure Laterality Date     SECTION      COLONOSCOPY N/A 2016    Procedure: COLONOSCOPY-Miralax split prep ;  Surgeon: Cali Oliveira Jr., MD;  Location: [facility] ENDO;  Service: Endoscopy;  Laterality: N/A;    COLONOSCOPY N/A 2022    Procedure: COLONOSCOPY;  Surgeon: Eli Kong MD;  Location: [facility] ENDO;  Service: Endoscopy;  Laterality: N/A;    DILATION AND CURETTAGE  OF UTERUS       Family History   Problem Relation Age of Onset    Dementia Mother     Parkinsonism Mother         PASSED AGE 78    Hypertension Father     Cancer Father         KIDNEY PASSED AGE 70    Heart disease Father 50        HEART ATTACK    Diabetes Father     Hypertension Sister     Diabetes Brother     Hypertension Brother     Diabetes Sister     Hypertension Sister     Hypertension Sister     Hypertension Brother     No Known Problems Daughter     No Known Problems Son     No Known Problems Son     Breast cancer Neg Hx     Colon cancer Neg Hx     Ovarian cancer Neg Hx      Current Outpatient Medications   Medication Sig Dispense Refill    amLODIPine (NORVASC) 5 MG tablet Take 1 tablet (5 mg total) by mouth once daily. 90 tablet 1    aspirin (ECOTRIN) 81 MG EC tablet Take 81 mg by mouth once daily.      DUOBRII 0.01-0.045 % Lotn Apply topically.      levothyroxine (SYNTHROID) 112 MCG tablet TAKE 1 TABLET BY MOUTH DAILY BEFORE AND BREAKFAST 90 tablet 1    lisinopriL-hydrochlorothiazide (PRINZIDE,ZESTORETIC) 20-25 mg Tab Take 1 tablet by mouth once daily. 90 tablet 1    metFORMIN (GLUCOPHAGE-XR) 500 MG ER 24hr tablet Take 2 tablets (1,000 mg total) by mouth daily with breakfast. 180 tablet 1    rosuvastatin (CRESTOR) 10 MG tablet Take 1 tablet (10 mg total) by mouth once daily. 90 each 1     No current facility-administered medications for this visit.     Review of patient's allergies indicates:   Allergen Reactions    Bactrim [sulfamethoxazole-trimethoprim] Rash    Pcn [penicillins] Rash     Social History     Socioeconomic History    Marital status:    Tobacco Use    Smoking status: Former     Current packs/day: 0.00     Average packs/day: 0.1 packs/day for 8.0 years (0.8 ttl pk-yrs)     Types: Cigarettes     Start date:      Quit date:      Years since quittin.8     Passive exposure: Never    Smokeless tobacco: Never    Tobacco comments:     socially only for around 20 years but not  "every day   Substance and Sexual Activity    Alcohol use: Yes     Comment: rare    Drug use: No    Sexual activity: Not Currently     Birth control/protection: Post-menopausal       ROS    REVIEW OF SYSTEMS: Negative as documented below as well as positive findings in bold.       Constitutional  Respiratory  Gastrointestinal  Skin   - Fever - Cough - Heartburn - Rash   - Chills - Spit blood - Nausea - Itching   - Weight Loss - Shortness of breath - Vomiting - Nail pain   - Malaise/Fatigue - Wheezing - Abdominal Pain  Wound/Ulcer   - Weight Gain   - Blood in Stool  Poor wound healing       - Diarrhea          Cardiovascular  Genitourinary  Neurological  HEENT   - Chest Pain - Dysuria - Burning Sensation of feet - Headache   - Palpitations - Hematuria - Tingling / Paresthesia - Congestion   - Pain at night in legs - Flank Pain - Dizziness - Sore Throat   - Cramping   - Tremor - Blurred Vision   - Leg Swelling   - Sensory Change - Double Vision   - Dizzy when standing   - Speech Change - Eye Redness       - Focal Weakness - Dry Eyes       - Loss of Consciousness          Endocrine  Musculoskeletal  Psychiatric   - Cold intolerance - Muscle Pain - Depression   - Heat intolerance - Neck Pain - Insomnia   - Anemia - Joint Pain - Memory Loss   -  Easy bruising, bleeding - Heel pain - Anxiety      Toe Pain        Leg/Ankle/Foot Pain         Objective:     Ht 5' 1" (1.549 m)   Wt 94 kg (207 lb 3.7 oz)   LMP  (LMP Unknown)   BMI 39.16 kg/m²   Vitals:    10/16/23 1143   Weight: 94 kg (207 lb 3.7 oz)   Height: 5' 1" (1.549 m)   PainSc:   2   PainLoc: Foot         Physical Exam    General Appearance:   Patient appears well developed, well nourished  Patient appears stated age    Psychiatric:   Patient is oriented to time, place, and person.  Patient has appropriate mood and affect    Neck:  Trachea Midline  No visible masses    Respiratory/Ears:  No distress or labored breathing.  Able to differentiate between normal talking " voice and whisper.  Able to follow commands    Eyes:  Visual Acuity intact  Lids and conjunctivae normal. No discoloration noted.    Foot Exam  Physical Exam  Ortho Exam  Ortho/SPM Exam  Foot/Ankle Musculoskeletal Exam    B/l LE exam con't:  V:  DP 2/4, PT 2/4   CRT< 3s to all digits tested   Tibial and popliteal lymph nodes are w/o abnormality   Edema: absent, varicosities: absent    N:  Patient displays normal ankle reflexes   SILT in SP/DP/T/Ellis/Saph distributions    Ortho: +Motor EHL/FHL/TA/GA   +TTP b/l PTT insertion   +TTP b/l sinus tarsi   Compartments soft/compressible. No pain on passive stretch of big toe. No calf Pain.   collapsing medial longitudinal arch with wb b/lLE   +too many toes sign b/lLe   B/l equinus deformity present    Derm:  skin intact, skin warm and dry, skin without ulcers or lesions, skin without induration, nails normal, no ecchymosis    Imaging / Labs:      X-Ray Foot Complete 3 view Bilateral    Result Date: 7/12/2023  EXAMINATION: XR FOOT COMPLETE 3 VIEW BILATERAL CLINICAL HISTORY: Pain in right foot TECHNIQUE: AP, lateral, and oblique views of both feet were performed. COMPARISON: None FINDINGS: Right: There is no radiographic evidence of acute osseous, articular, or soft tissue abnormality.  Some minimal degenerative findings present at the 1st MTP joint.No osseous erosions demonstrated. Left: There is no radiographic evidence of acute osseous, articular, or soft tissue abnormality.  There is mild osteoarthritis at the 1st MTP joint.  No osseous erosions demonstrated.     Mild degenerative findings as above Electronically signed by: Daren Morales MD Date:    07/12/2023 Time:    14:19        Note: This was dictated using a computer transcription program. Although proofread, it may contain computer transcription errors and phonetic errors. Other human proofreading errors may also exist. Corrections may be performed at a later time. Please contact us for any clarification if  needed.    Samuel Zapata,  SILVESTREM  [facility]

## 2023-10-16 NOTE — PATIENT INSTRUCTIONS
Posterior Tibial Tendon Dysfunction (PTTD)      What Is PTTD?   Posterior tibial tendon dysfunction (PTTD)  The posterior tibial tendon serves as one of the major supporting structures of the foot, helping it to function while walking. Posterior tibial tendon dysfunction (PTTD) is a condition caused by changes in the tendon, impairing its ability to support the arch. This results in flattening of the foot.              PTTD is often called adult acquired flatfoot because it is the most common type of flatfoot developed during adulthood. Although this condition typically occurs in only one foot, some people may develop it in both feet. PTTD is usually progressive, which means it will keep getting worse, especially if it is not treated early.    Causes  Overuse of the posterior tibial tendon is often the cause of PTTD. In fact, the symptoms usually occur after activities that involve the tendon, such as running, walking, hiking or climbing stairs.        Symptoms  The symptoms of PTTD may include pain, swelling, a flattening of the arch and an inward rolling of the ankle. As the condition progresses, the symptoms will change.    For example, when PTTD initially develops, there is pain on the inside of the foot and ankle (along the course of the tendon). In addition, the area may be red, warm and swollen.    Later, as the arch begins to flatten, there may still be pain on the inside of the foot and ankle. But at this point, the foot and toes begin to turn outward and the ankle rolls inward.    As PTTD becomes more advanced, the arch flattens even more and the pain often shifts to the outside of the foot, below the ankle. The tendon has deteriorated considerably, and arthritis often develops in the foot. In more severe cases, arthritis may also develop in the ankle.    Nonsurgical Treatment  Because of the progressive nature of PTTD, early treatment is advised. If treated early enough, your symptoms may resolve without  the need for surgery, and progression of your condition can be arrested.    In contrast, untreated PTTD could leave you with an extremely flat foot, painful arthritis in the foot and ankle and increasing limitations on walking, running or other activities.    In many cases of PTTD, treatment can begin with nonsurgical approaches that may include:    Orthotic devices or bracing. To give your arch the support it needs, your foot and ankle surgeon may provide you with an ankle brace or a custom orthotic device that fits into the shoe.  Immobilization. Sometimes a short-leg cast or boot is worn to immobilize the foot and allow the tendon to heal, or you may need to completely avoid all weightbearing for a while.  Physical therapy. Ultrasound therapy and exercises may help rehabilitate the tendon and muscle following immobilization.  Medications. Nonsteroidal anti-inflammatory drugs (NSAIDs), such as ibuprofen, help reduce the pain and inflammation.  Shoe modifications. Your foot and ankle surgeon may advise changes to your shoes and may provide special inserts designed to improve arch support.     When Is Surgery Needed?  In cases of PTTD that have progressed substantially or have failed to improve with nonsurgical treatment, surgery may be required. For some advanced cases, surgery may be the only option. Your foot and ankle surgeon will determine the best approach for you.      Understanding Posterior Tibialis Tenosynovitis    The posterior tibialis tendon runs along the inside of the foot. It connects the calf muscle (posterior tibialis muscle) to bones on the inside of the foot. It helps maintain the arch of the foot. It also gives you stability when you move. Posterior tibialis tenosynovitis is when this tendon becomes inflamed or torn.     How to say it  [id number]   What causes posterior tibialis tenosynovitis?  This condition is often caused by an injury to the tendon. For instance, a fall  can tear the tendon. Overuse can also damage it. People who play sports like basketball or tennis a lot may weaken the tendon over time. Flat feet can also be a contributing factor to developing this condition.  Symptoms of posterior tibialis tenosynovitis  The symptoms of this condition include pain and swelling. The pain is usually felt near the tendon, on the inside of the foot and ankle. It often gets worse over time or with an increase in activity. Your arch may eventually fall, leading to a flat foot. Your foot may also start to turn outward.  Treatment for posterior tibialis tenosynovitis  Treatment for this condition depends on the severity of the tear. Treatment may include:  Rest. You should avoid any activities that cause pain and swelling. You may also need to limit the amount of weight you put on your injured foot.  Cold packs. Putting a cold pack on the tendon may reduce pain and swelling.  Medicine. Over-the-counter pain medicines can reduce pain and swelling.  Leg cast or walking boot. Severe tears of the posterior tibialis tendon may need to be kept from moving. These devices can help protect the tendon and reduce swelling.  Shoe insert or brace. Like a leg cast or walking boot, these devices can help protect the tendon as it heals. They may also ease pain.  Strengthening and stretching exercises. Certain exercises can help you regain strength and flexibility in your foot..  Surgery. Several surgical choices are available to fix the torn tendon or replace it. But you often do not need surgery unless your symptoms do not get better after trying other treatments for at least 6 months.     When to call your healthcare provider  Call your healthcare provider right away if you have any of these:  Fever of 100.4°F (38°C) or higher, or as directed  Pain that gets worse  Symptoms that dont get better, or get worse  New symptoms    Date Last Reviewed: 3/10/2016  © 6456-6523 [employer]. [address]  [place] [address]. All rights reserved. This information is not intended as a substitute for professional medical care. Always follow your healthcare professional's instructions.      Flexible Flatfoot        What Is Flatfoot?    Flatfoot entails partial or total collapse (loss) of the arch  Flatfoot is often a complex disorder, with diverse symptoms and varying degrees of deformity and disability. There are several types of flatfoot, all of which have one characteristic in common: partial or total collapse (loss) of the arch. Other characteristics shared by most types of flatfoot include toe drift, in which the toes and front part of the foot point outward. The heel tilts toward the outside and the ankle appears to turn in. A tight Achilles tendon, which causes the heel to lift off the ground earlier when walking and may make the problem worse, bunions and hammertoes may develop as a result of a flatfoot.    Flexible Flatfoot  Normal foot with archFlexible flatfoot is one of the most common types of flatfoot. It typically begins in childhood or adolescence and continues into adulthood. It usually occurs in both feet and progresses in severity throughout the adult years. As the deformity worsens, the soft tissues (tendons and ligaments) of the arch may stretch or tear and can become inflamed. The term flexible means that while the foot is flat when standing (weightbearing), the arch returns when not standing.                  Symptoms  Symptoms that may occur in some persons with flexible flatfoot include:    Pain in the heel, arch, ankle or along the outside of the foot  Rolled-in ankle (overpronation)  Pain along the shin bone (shin splint)  General aching or fatigue in the foot or leg  Low back, hip or knee pain     Diagnosis  In diagnosing flatfoot, the foot and ankle surgeon examines the foot and observes how it looks when you stand and sit. X-rays are usually taken to determine the  disorder's severity. If you are diagnosed with flexible flatfoot, but you do not have any symptoms, your surgeon will explain what you might expect in the future.    Nonsurgical Treatment  If you experience symptoms with flexible flatfoot, the surgeon may recommend nonsurgical treatment options, including:    Activity modifications. Cut down on activities that bring you pain and avoid prolonged walking and standing to give your arches a rest.  Weight loss. If you are overweight, try to lose weight. Putting too much weight on your arches may aggravate your symptoms.  Orthotic devices. Your foot and ankle surgeon can provide you with custom orthotic devices for your shoes to give more support to the arches.  Immobilization. In some cases, it may be necessary to use a walking cast or to completely avoid weightbearing.  Medications. Nonsteroidal anti-inflammatory drugs (NSAIDs), such as ibuprofen, help reduce pain and inflammation.  Physical therapy. Ultrasound therapy or other physical therapy modalities may be used to provide temporary relief.  Shoe modifications. Wearing shoes that support the arches is important for anyone who has flatfoot.   Ankle Foot Orthoses (AFO) devices. Your foot and ankle surgeon may recommend advanced bracing to modify your walking and to support your arches.     When Is Surgery Necessary?  In some patients whose pain is not adequately relieved by other treatments, surgery may be considered. Many surgical techniques are available to correct flexible flatfoot, and one or a combination of procedures may be required to relieve the symptoms and improve foot function. In selecting the procedure or combination of procedures for your particular case, the foot and ankle surgeon will take into consideration the extent of your deformity based on the x-ray findings, your age, your activity level and other factors. The length of the recovery period will vary depending on the procedure or procedures  performed.        Recommended [facility] orthotics:  -powerstep  -superfeet    Recommended shoegear:  -new balance  -ascics  -ochoao  -gonzalez      Equinus          What Is Equinus?    Equinus is a condition in which the upward bending motion of the ankle joint is limited. Someone with equinus lacks the flexibility to bring the top of the foot toward the front of the leg. Equinus can occur in one or both feet. When it involves both feet, the limitation of motion is sometimes worse in one foot than in the other.    People with equinus develop ways to compensate for their limited ankle motion, and this often leads to other foot, leg or back problems. The most common methods of compensation are flattening of the arch or picking up the heel early when walking, placing increased pressure on the ball of the foot. Other patients compensate by toe walking, while a smaller number take steps by bending abnormally at the hip or knee.    Causes  There are several possible causes for the limited range of ankle motion. Often, it is due to tightness in the Achilles tendon or calf muscles (the soleus muscle and/or gastrocnemius muscle). In some patients, this tightness is congenital (present at birth), and sometimes it is an inherited trait. Other patients acquire the tightness from being in a cast, being on crutches or frequently wearing high-heeled shoes. In addition, diabetes can affect the fibers of the Achilles tendon and cause tightness. Sometimes equinus is related to a bone blocking the ankle motion. For example, a fragment of a broken bone following an ankle injury, or bone block, can get in the way and restrict motion. Equinus may also result from one leg being shorter than the other. Less often, equinus is caused by spasms in the calf muscle. These spasms may be signs of an underlying neurologic disorder.      Foot Problems Related to Equinus  Depending on how a patient compensates for the inability to bend properly at the  ankle, a variety of foot conditions can develop, including:    Plantar fasciitis (arch/heel pain)  Calf cramping  Tendonitis (inflammation in the Achilles tendon)  Metatarsalgia (pain and/or callusing on the ball of the foot)  Flatfoot  Arthritis of the midfoot (middle area of the foot)  Pressure sores on the ball of the foot or the arch  Bunions and hammertoes  Ankle pain  Shin splints     Diagnosis  Most patients with equinus are unaware they have this condition when they first visit the doctor. Instead, they come to the doctor seeking relief for foot problems associated with equinus.    To diagnose equinus, the foot and ankle surgeon will evaluate the ankle's range of motion when the knee is flexed (bent) as well as extended (straightened). This enables the surgeon to identify whether the tendon or muscle is tight and to assess whether bone is interfering with ankle motion. X-rays may also be ordered. In some cases, the foot and ankle surgeon may refer the patient for neurologic evaluation.    Nonsurgical Treatment  Treatment includes strategies aimed at relieving the symptoms and conditions associated with equinus. In addition, the patient is treated for the equinus itself through one or more of the following options:    Night splint. The foot may be placed in a splint at night to keep it in a position that helps reduce tightness of the calf muscle.  Heel lifts. Placing heel lifts inside the shoes or wearing shoes with a moderate heel takes stress off the Achilles tendon when walking and may reduce symptoms.  Arch supports or orthotic devices. Custom orthotic devices that fit into the shoe are often prescribed to keep weight distributed properly and to help control muscle/tendon imbalance.  Physical therapy. To help remedy muscle tightness, exercises that stretch the calf muscle(s) are recommended.    When Is Surgery Needed?  In some cases, surgery may be needed to correct the cause of equinus if it is related  to a tight tendon or a bone blocking the ankle motion. The foot and ankle surgeon will determine the type of procedure that is best suited to the individual patient.    What Is a Gastrocnemius Release?  The gastrocnemius (gastroc) and the soleus are two muscles that make up the calf. The gastroc is the larger and more superficial of the two muscles. The soleus is a deeper muscle within the lower leg. The gastroc tendon combines with the soleus tendon to form the Achilles tendon.  Tightness in the calf can limit how for the ankle can flex up. This may make it difficult to walk with the heel on the floor. Over time this can cause problems such as pain and deformity. Calf tightness may contribute to many foot problems, including heel pain, Achilles tendon pain, flatfoot deformity, toe pain, and bunions.  A gastrocnemius release lengthens the gastrocnemius tendon. This is done to increase the flexibility of the calf muscle, which can decrease pressure at the front of the foot, improve function, and decrease deformity.    Diagnosis  This surgery is done in patients with tightness of the gastroc that has not improved with stretching exercises. This procedure can be combined with other reconstructive procedures or be performed by itself.  Surgery can be avoided when appropriate range of motion and flexibility can be obtained with conservative treatment (stretching). It should also be avoided if there are contractures of multiple tendons in the leg, and not just the gastroc.     Treatment  The surgery can be performed through several different incisions. Most commonly, a small incision is made on the inner side of the lower leg. Sometimes an incision directly in the back of the calf is used, or even an endoscopic incision, which is about ½ inch. Once the gastroc tendon is identified, it is  from the underlying muscle belly of the soleus, then cut straight across. Once the tendon is released, the ankle is flexed up  and an increased range of motion is noted intra-operatively.     Recovery  For the first two weeks after surgery, the patient typically is immobilized in a splint or boot. It is important to keep the ankle in a proper position while the tendon is healing. A cramping feeling in the back of the calf is normal. Gentle range of motion and stretching begin once the ankle is removed from the splint/boot. Timing can vary depending upon what other procedures are done.     Risks and Complications  After a gastroc release, some patients experience nerve injury that results in irritation or numbness over the outside of the heel. This usually is temporary. In addition, some patients may notice a difference in the appearance of one calf compared to the other and temporary calf weakness.    FAQs  Why are my calf muscles tight?   Most frequently a tight calf muscle is an inherited problem that only causes problems later in life. Other reasons for calf tightness are nerve injuries, muscle problems, and other medical problems like stroke and diabetes. People can also get tight calf muscles after trauma to the leg, ankle, or foot.    Will a gastrocnemius lengthening affect my strength or ability to walk?  This procedure will cause some weakness but most patients will not notice it. Some patients may have a subtle limp, but this typically resolves within six months of surgery.

## 2023-10-23 PROBLEM — M79.672 HEEL PAIN, BILATERAL: Status: ACTIVE | Noted: 2023-10-23

## 2023-10-23 PROBLEM — R26.9 GAIT ABNORMALITY: Status: ACTIVE | Noted: 2023-10-23

## 2023-10-23 PROBLEM — M79.671 HEEL PAIN, BILATERAL: Status: ACTIVE | Noted: 2023-10-23

## 2023-10-23 PROBLEM — R53.1 WEAKNESS: Status: ACTIVE | Noted: 2023-10-23

## 2023-11-13 ENCOUNTER — PATIENT MESSAGE (OUTPATIENT)
Dept: ADMINISTRATIVE | Facility: HOSPITAL | Age: 63
End: 2023-11-13
Payer: COMMERCIAL

## 2023-11-13 DIAGNOSIS — Z12.31 ENCOUNTER FOR SCREENING MAMMOGRAM FOR MALIGNANT NEOPLASM OF BREAST: Primary | ICD-10-CM

## 2023-11-21 ENCOUNTER — PATIENT MESSAGE (OUTPATIENT)
Dept: FAMILY MEDICINE | Facility: CLINIC | Age: 63
End: 2023-11-21
Payer: COMMERCIAL

## 2023-11-21 ENCOUNTER — PATIENT MESSAGE (OUTPATIENT)
Dept: OBSTETRICS AND GYNECOLOGY | Facility: CLINIC | Age: 63
End: 2023-11-21
Payer: COMMERCIAL

## 2023-12-18 ENCOUNTER — TELEPHONE (OUTPATIENT)
Dept: FAMILY MEDICINE | Facility: CLINIC | Age: 63
End: 2023-12-18
Payer: COMMERCIAL

## 2023-12-18 ENCOUNTER — OFFICE VISIT (OUTPATIENT)
Dept: FAMILY MEDICINE | Facility: CLINIC | Age: 63
End: 2023-12-18
Payer: COMMERCIAL

## 2023-12-18 VITALS
HEIGHT: 61 IN | DIASTOLIC BLOOD PRESSURE: 76 MMHG | OXYGEN SATURATION: 98 % | TEMPERATURE: 98 F | HEART RATE: 84 BPM | WEIGHT: 212.31 LBS | BODY MASS INDEX: 40.08 KG/M2 | SYSTOLIC BLOOD PRESSURE: 132 MMHG

## 2023-12-18 DIAGNOSIS — E11.59 HYPERTENSION ASSOCIATED WITH TYPE 2 DIABETES MELLITUS: ICD-10-CM

## 2023-12-18 DIAGNOSIS — E11.69 HYPERLIPIDEMIA ASSOCIATED WITH TYPE 2 DIABETES MELLITUS: ICD-10-CM

## 2023-12-18 DIAGNOSIS — I15.2 HYPERTENSION ASSOCIATED WITH TYPE 2 DIABETES MELLITUS: ICD-10-CM

## 2023-12-18 DIAGNOSIS — E78.5 HYPERLIPIDEMIA ASSOCIATED WITH TYPE 2 DIABETES MELLITUS: ICD-10-CM

## 2023-12-18 DIAGNOSIS — E11.9 TYPE 2 DIABETES MELLITUS WITHOUT COMPLICATION, WITHOUT LONG-TERM CURRENT USE OF INSULIN: Primary | ICD-10-CM

## 2023-12-18 DIAGNOSIS — E03.9 ACQUIRED HYPOTHYROIDISM: ICD-10-CM

## 2023-12-18 DIAGNOSIS — K76.0 NAFLD (NONALCOHOLIC FATTY LIVER DISEASE): ICD-10-CM

## 2023-12-18 DIAGNOSIS — Z13.0 SCREENING FOR DEFICIENCY ANEMIA: ICD-10-CM

## 2023-12-18 DIAGNOSIS — E66.01 CLASS 3 SEVERE OBESITY DUE TO EXCESS CALORIES WITH SERIOUS COMORBIDITY AND BODY MASS INDEX (BMI) OF 40.0 TO 44.9 IN ADULT: ICD-10-CM

## 2023-12-18 DIAGNOSIS — G47.33 OSA ON CPAP: ICD-10-CM

## 2023-12-18 DIAGNOSIS — Z13.820 OSTEOPOROSIS SCREENING: ICD-10-CM

## 2023-12-18 DIAGNOSIS — R26.9 GAIT ABNORMALITY: ICD-10-CM

## 2023-12-18 PROBLEM — E66.813 CLASS 3 SEVERE OBESITY DUE TO EXCESS CALORIES WITH SERIOUS COMORBIDITY AND BODY MASS INDEX (BMI) OF 40.0 TO 44.9 IN ADULT: Status: ACTIVE | Noted: 2017-09-12

## 2023-12-18 PROCEDURE — 1160F RVW MEDS BY RX/DR IN RCRD: CPT | Mod: CPTII,S$GLB,, | Performed by: NURSE PRACTITIONER

## 2023-12-18 PROCEDURE — 3008F PR BODY MASS INDEX (BMI) DOCUMENTED: ICD-10-PCS | Mod: CPTII,S$GLB,, | Performed by: NURSE PRACTITIONER

## 2023-12-18 PROCEDURE — 3288F FALL RISK ASSESSMENT DOCD: CPT | Mod: CPTII,S$GLB,, | Performed by: NURSE PRACTITIONER

## 2023-12-18 PROCEDURE — 3044F PR MOST RECENT HEMOGLOBIN A1C LEVEL <7.0%: ICD-10-PCS | Mod: CPTII,S$GLB,, | Performed by: NURSE PRACTITIONER

## 2023-12-18 PROCEDURE — 1101F PT FALLS ASSESS-DOCD LE1/YR: CPT | Mod: CPTII,S$GLB,, | Performed by: NURSE PRACTITIONER

## 2023-12-18 PROCEDURE — 3075F SYST BP GE 130 - 139MM HG: CPT | Mod: CPTII,S$GLB,, | Performed by: NURSE PRACTITIONER

## 2023-12-18 PROCEDURE — 4010F ACE/ARB THERAPY RXD/TAKEN: CPT | Mod: CPTII,S$GLB,, | Performed by: NURSE PRACTITIONER

## 2023-12-18 PROCEDURE — 3078F PR MOST RECENT DIASTOLIC BLOOD PRESSURE < 80 MM HG: ICD-10-PCS | Mod: CPTII,S$GLB,, | Performed by: NURSE PRACTITIONER

## 2023-12-18 PROCEDURE — 3066F NEPHROPATHY DOC TX: CPT | Mod: CPTII,S$GLB,, | Performed by: NURSE PRACTITIONER

## 2023-12-18 PROCEDURE — 1160F PR REVIEW ALL MEDS BY PRESCRIBER/CLIN PHARMACIST DOCUMENTED: ICD-10-PCS | Mod: CPTII,S$GLB,, | Performed by: NURSE PRACTITIONER

## 2023-12-18 PROCEDURE — 1159F PR MEDICATION LIST DOCUMENTED IN MEDICAL RECORD: ICD-10-PCS | Mod: CPTII,S$GLB,, | Performed by: NURSE PRACTITIONER

## 2023-12-18 PROCEDURE — 3066F PR DOCUMENTATION OF TREATMENT FOR NEPHROPATHY: ICD-10-PCS | Mod: CPTII,S$GLB,, | Performed by: NURSE PRACTITIONER

## 2023-12-18 PROCEDURE — 99999 PR PBB SHADOW E&M-EST. PATIENT-LVL IV: CPT | Mod: PBBFAC,,, | Performed by: NURSE PRACTITIONER

## 2023-12-18 PROCEDURE — 3044F HG A1C LEVEL LT 7.0%: CPT | Mod: CPTII,S$GLB,, | Performed by: NURSE PRACTITIONER

## 2023-12-18 PROCEDURE — 1159F MED LIST DOCD IN RCRD: CPT | Mod: CPTII,S$GLB,, | Performed by: NURSE PRACTITIONER

## 2023-12-18 PROCEDURE — 3288F PR FALLS RISK ASSESSMENT DOCUMENTED: ICD-10-PCS | Mod: CPTII,S$GLB,, | Performed by: NURSE PRACTITIONER

## 2023-12-18 PROCEDURE — 99999 PR PBB SHADOW E&M-EST. PATIENT-LVL IV: ICD-10-PCS | Mod: PBBFAC,,, | Performed by: NURSE PRACTITIONER

## 2023-12-18 PROCEDURE — 99214 PR OFFICE/OUTPT VISIT, EST, LEVL IV, 30-39 MIN: ICD-10-PCS | Mod: S$GLB,,, | Performed by: NURSE PRACTITIONER

## 2023-12-18 PROCEDURE — 3075F PR MOST RECENT SYSTOLIC BLOOD PRESS GE 130-139MM HG: ICD-10-PCS | Mod: CPTII,S$GLB,, | Performed by: NURSE PRACTITIONER

## 2023-12-18 PROCEDURE — 2023F PR DILATED RETINAL EXAM W/O EVID OF RETINOPATHY: ICD-10-PCS | Mod: CPTII,S$GLB,, | Performed by: NURSE PRACTITIONER

## 2023-12-18 PROCEDURE — 2023F DILAT RTA XM W/O RTNOPTHY: CPT | Mod: CPTII,S$GLB,, | Performed by: NURSE PRACTITIONER

## 2023-12-18 PROCEDURE — 1101F PR PT FALLS ASSESS DOC 0-1 FALLS W/OUT INJ PAST YR: ICD-10-PCS | Mod: CPTII,S$GLB,, | Performed by: NURSE PRACTITIONER

## 2023-12-18 PROCEDURE — 3061F PR NEG MICROALBUMINURIA RESULT DOCUMENTED/REVIEW: ICD-10-PCS | Mod: CPTII,S$GLB,, | Performed by: NURSE PRACTITIONER

## 2023-12-18 PROCEDURE — 3008F BODY MASS INDEX DOCD: CPT | Mod: CPTII,S$GLB,, | Performed by: NURSE PRACTITIONER

## 2023-12-18 PROCEDURE — 4010F PR ACE/ARB THEARPY RXD/TAKEN: ICD-10-PCS | Mod: CPTII,S$GLB,, | Performed by: NURSE PRACTITIONER

## 2023-12-18 PROCEDURE — 3061F NEG MICROALBUMINURIA REV: CPT | Mod: CPTII,S$GLB,, | Performed by: NURSE PRACTITIONER

## 2023-12-18 PROCEDURE — 3078F DIAST BP <80 MM HG: CPT | Mod: CPTII,S$GLB,, | Performed by: NURSE PRACTITIONER

## 2023-12-18 PROCEDURE — 99214 OFFICE O/P EST MOD 30 MIN: CPT | Mod: S$GLB,,, | Performed by: NURSE PRACTITIONER

## 2023-12-18 NOTE — TELEPHONE ENCOUNTER
----- Message from Daryn Vaughn sent at 12/18/2023 12:18 PM CST -----  Contact: pt  .Type:  Needs Medical Advice    Who Called: pt    Would the patient rather a call back or a response via [facility]?  Call back  Best Call Back Number: [phone]  Additional Information: Pt. Is requesting a call back from the office regarding her appt. Today @ 1:30 p.m.

## 2023-12-18 NOTE — PROGRESS NOTES
"Subjective:       Patient ID: Digna Bhatia is a 63 y.o. female.    Chief Complaint: Follow-up (6 months F/U)    HPI    Patient is a 63-year-old white female with Type 2 Diabetes definitively diagnosed in August 2019 but Prediabetic since October 2018, hypertension, hyperlipidemia, hypothyroidism, fatty liver disease noted on ultrasound May 2017 with elevated liver enzymes, and obstructive sleep apnea with CPAP use that is here today for 6 month follow up with fasting lab results.        TYPE 2 DIABETES  Currently taking  Metformin XR 1000 mg daily   FBG is now 147 with HgbA1C 6.0%.   Recheck in 6 months           Hyperlipidemia   controlled on Rosuvastatin 10 mg daily.   LDL 63.4           Hypertension  Currently controlled on present medications, amlodipine 5 mg daily and lisinopril HCT 20/25 mg daily.  /76 (BP Location: Left arm, Patient Position: Sitting, BP Method: Large (Manual))   Pulse 84   Temp 97.7 °F (36.5 °C) (Temporal)   Ht 5' 1" (1.549 m)   Wt 96.3 kg (212 lb 4.9 oz)   LMP  (LMP Unknown)   SpO2 98%   BMI 40.11 kg/m²           Hypothyroidism   Currently taking Levothyroxine 112 mcg daily  NOT taking correctly - she has been taking with all her other medications and not on an empty stomach  TSH is mildly high but not taking medication correctly  Will start taking medication correctly  Recheck in 6 months.        Obesity  Body mass index is 40.11 kg/m².  Working on lifestyle modifications.     Fatty Liver Disease/Gilbert's syndrome  Seen on abdominal ultrasound 8/16/2017  Evaluated by GI Dr. Oliveira in 2017  Chronic elevation bilirubin (Gilbert's Syndrome)  AST/ALT within range         Obstructive sleep apnea   Noncompliant with CPAP machine use - state machine was part of recall and never had fixed.     Gait Abnormality with Chronic Right Ankle Swelling  Patient reported she started noting that her right foot turns INWARD when she walks and has started having more pain to the foot.  "   She has CHRONIC Right outer ankle swelling for multiple years that states occurs after an injury years ago.  Referred to podiatry to further evaluate the gait abnormality.  Seen Dr. Owens - he advised the right ankle swelling secondary to a LIPOMA  Then seen Dr. Zapata - diagnosed as Posterior tibial tendon dysfunction (PTTD) of both lower extremities   Sent to Physical Therapy      Lab Visit on 12/11/2023   Component Date Value Ref Range Status    Sodium 12/11/2023 135 (L)  136 - 145 mmol/L Final    Potassium 12/11/2023 4.3  3.5 - 5.1 mmol/L Final    Chloride 12/11/2023 100  95 - 110 mmol/L Final    CO2 12/11/2023 26  23 - 29 mmol/L Final    Glucose 12/11/2023 147 (H)  70 - 110 mg/dL Final    BUN 12/11/2023 24 (H)  7 - 17 mg/dL Final    Creatinine 12/11/2023 0.96  0.50 - 1.40 mg/dL Final    Calcium 12/11/2023 9.6  8.7 - 10.5 mg/dL Final    Total Protein 12/11/2023 7.1  6.0 - 8.4 g/dL Final    Albumin 12/11/2023 4.1  3.5 - 5.2 g/dL Final    Total Bilirubin 12/11/2023 1.6 (H)  0.1 - 1.0 mg/dL Final    Comment: For infants and newborns, interpretation of results should be based  on gestational age, weight and in agreement with clinical  observations.    Premature Infant recommended reference ranges:  Up to 24 hours.............<8.0 mg/dL  Up to 48 hours............<12.0 mg/dL  3-5 days..................<15.0 mg/dL  6-29 days.................<15.0 mg/dL      Alkaline Phosphatase 12/11/2023 92  38 - 126 U/L Final    AST 12/11/2023 26  15 - 46 U/L Final    ALT 12/11/2023 28  10 - 44 U/L Final    Anion Gap 12/11/2023 9  8 - 16 mmol/L Final    eGFR 12/11/2023 >60.0  >60 mL/min/1.73 m^2 Final    TSH 12/11/2023 4.160 (H)  0.400 - 4.000 uIU/mL Final    Comment: Warning:  Heterophilic antibodies in serum or plasma of   certain individuals are known to cause interference with   immunoassays. These antibodies may be present in blood samples   from individuals regularly exposed to animal or who have been   treated with animal  "products.     Patients taking high doses of supplemental biotin may have  negatively biased results.       Hemoglobin A1C 12/11/2023 6.0 (H)  4.0 - 5.6 % Final    Comment: ADA Screening Guidelines:  5.7-6.4%  Consistent with prediabetes  >or=6.5%  Consistent with diabetes    High levels of fetal hemoglobin interfere with the HbA1C  assay. Heterozygous hemoglobin variants (HbS, HgC, etc)do  not significantly interfere with this assay.   However, presence of multiple variants may affect accuracy.      Estimated Avg Glucose 12/11/2023 126  68 - 131 mg/dL Final    Free T4 12/11/2023 0.96  0.71 - 1.51 ng/dL Final       Review of Systems   HENT: Negative.     Respiratory: Negative.     Cardiovascular: Negative.          Objective:     Vitals:    12/18/23 1339   BP: 132/76   BP Location: Left arm   Patient Position: Sitting   BP Method: Large (Manual)   Pulse: 84   Temp: 97.7 °F (36.5 °C)   TempSrc: Temporal   SpO2: 98%   Weight: 96.3 kg (212 lb 4.9 oz)   Height: 5' 1" (1.549 m)          Physical Exam  Constitutional:       General: She is not in acute distress.     Appearance: She is well-developed. She is obese. She is not ill-appearing, toxic-appearing or diaphoretic.      Comments: + obesity. Body mass index is 40.11 kg/m².     HENT:      Head: Normocephalic and atraumatic.   Eyes:      General: No scleral icterus.        Right eye: No discharge.         Left eye: No discharge.      Extraocular Movements: Extraocular movements intact.      Conjunctiva/sclera: Conjunctivae normal.   Neck:      Thyroid: No thyromegaly.      Vascular: No JVD.      Trachea: No tracheal deviation.   Cardiovascular:      Rate and Rhythm: Normal rate and regular rhythm.      Heart sounds: Normal heart sounds. No murmur heard.  Pulmonary:      Effort: Pulmonary effort is normal. No respiratory distress.      Breath sounds: Normal breath sounds. No stridor. No wheezing or rales.   Abdominal:      General: There is no distension. "   Musculoskeletal:         General: Normal range of motion.      Cervical back: Normal range of motion and neck supple.   Lymphadenopathy:      Cervical: No cervical adenopathy.   Skin:     General: Skin is warm and dry.      Findings: No rash.   Neurological:      Mental Status: She is alert and oriented to person, place, and time.      Cranial Nerves: No cranial nerve deficit.      Coordination: Coordination normal.   Psychiatric:         Mood and Affect: Mood normal.         Behavior: Behavior normal.         Thought Content: Thought content normal.         Judgment: Judgment normal.           Assessment:         ICD-10-CM ICD-9-CM   1. Type 2 diabetes mellitus without complication, without long-term current use of insulin  E11.9 250.00   2. Hypertension associated with type 2 diabetes mellitus  E11.59 250.80    I15.2 401.9   3. Hyperlipidemia associated with type 2 diabetes mellitus  E11.69 250.80    E78.5 272.4   4. Class 3 severe obesity due to excess calories with serious comorbidity and body mass index (BMI) of 40.0 to 44.9 in adult  E66.01 278.01    Z68.41 V85.41   5. Acquired hypothyroidism  E03.9 244.9   6. NAFLD (nonalcoholic fatty liver disease)  K76.0 571.8   7. Gait abnormality  R26.9 781.2   8. KELLY on CPAP  G47.33 327.23   9. Osteoporosis screening  Z13.820 V82.81   10. Screening for deficiency anemia  Z13.0 V78.1       Plan:       1. Type 2 diabetes mellitus without complication, without long-term current use of insulin  Overview:  Currently taking  Metformin XR 1000 mg daily   FBG is now 147 with HgbA1C 6.0%.   Recheck in 6 months      Orders:  -     Comprehensive Metabolic Panel; Future; Expected date: 12/18/2023  -     Hemoglobin A1C; Future; Expected date: 12/18/2023    2. Hypertension associated with type 2 diabetes mellitus  Overview:  Currently controlled on present medications, amlodipine 5 mg daily and lisinopril HCT 20/25 mg daily.  /76 (BP Location: Left arm, Patient Position:  "Sitting, BP Method: Large (Manual))   Pulse 84   Temp 97.7 °F (36.5 °C) (Temporal)   Ht 5' 1" (1.549 m)   Wt 96.3 kg (212 lb 4.9 oz)   LMP  (LMP Unknown)   SpO2 98%   BMI 40.11 kg/m²       3. Hyperlipidemia associated with type 2 diabetes mellitus  Overview:  controlled on Rosuvastatin 10 mg daily.   LDL 63.4         Orders:  -     Lipid Panel; Future; Expected date: 12/18/2023    4. Class 3 severe obesity due to excess calories with serious comorbidity and body mass index (BMI) of 40.0 to 44.9 in adult  Overview:  Body mass index is 40.11 kg/m².  Working on lifestyle modifications.      5. Acquired hypothyroidism  Overview:  Currently taking Levothyroxine 112 mcg daily  NOT taking correctly - she has been taking with all her other medications and not on an empty stomach  TSH is mildly high but not taking medication correctly  Will start taking medication correctly  Recheck in 6 months.      Orders:  -     TSH; Future; Expected date: 12/18/2023  -     T4, Free; Future; Expected date: 12/18/2023    6. NAFLD (nonalcoholic fatty liver disease)  Overview:  Seen on abdominal ultrasound 8/16/2017  Evaluated by GI Dr. Oliveira in 2017  Chronic elevation bilirubin (Gilbert's Syndrome)  AST/ALT within range          7. Gait abnormality  Overview:  Gait Abnormality with Chronic Right Ankle Swelling  Patient reported she started noting that her right foot turns INWARD when she walks and has started having more pain to the foot.    She has CHRONIC Right outer ankle swelling for multiple years that states occurs after an injury years ago.  Referred to podiatry to further evaluate the gait abnormality.  Seen Dr. Owens - he advised the right ankle swelling secondary to a LIPOMA  Then seen Dr. Zapata - diagnosed as Posterior tibial tendon dysfunction (PTTD) of both lower extremities   Sent to Physical Therapy        8. KELLY on CPAP  Overview:  Noncompliant with CPAP machine use - state machine was part of recall and never " had fixed.      9. Osteoporosis screening  -     DXA Bone Density Axial Skeleton 1 or more sites; Future; Expected date: 2023    10. Screening for deficiency anemia  -     CBC Auto Differential; Future; Expected date: 2023       Follow up in about 6 months (around 2024) for fasting labs and WELLNESS EXAM.     Patient's Medications   New Prescriptions    No medications on file   Previous Medications    AMLODIPINE (NORVASC) 5 MG TABLET    Take 1 tablet (5 mg total) by mouth once daily.    ASPIRIN (ECOTRIN) 81 MG EC TABLET    Take 81 mg by mouth once daily.    DUOBRII 0.01-0.045 % LOTN    Apply topically.    LEVOTHYROXINE (SYNTHROID) 112 MCG TABLET    TAKE 1 TABLET BY MOUTH DAILY BEFORE AND BREAKFAST    LISINOPRIL-HYDROCHLOROTHIAZIDE (PRINZIDE,ZESTORETIC) 20-25 MG TAB    Take 1 tablet by mouth once daily.    METFORMIN (GLUCOPHAGE-XR) 500 MG ER 24HR TABLET    Take 2 tablets (1,000 mg total) by mouth daily with breakfast.    ROSUVASTATIN (CRESTOR) 10 MG TABLET    Take 1 tablet (10 mg total) by mouth once daily.   Modified Medications    No medications on file   Discontinued Medications    No medications on file       Past Medical History:   Diagnosis Date    H/O mammogram 3/31/2016    has done at [facility]    Hyperlipidemia 2017    Hypertension     Hyperthyroidism     Hypothyroidism     IFG (impaired fasting glucose)     NAFLD (nonalcoholic fatty liver disease)     KELLY (obstructive sleep apnea)     on CPAP    KELLY on CPAP     Screening for colorectal cancer 2016    Type 2 diabetes mellitus without complication, without long-term current use of insulin 2019       Past Surgical History:   Procedure Laterality Date     SECTION      COLONOSCOPY N/A 2016    Procedure: COLONOSCOPY-Miralax split prep ;  Surgeon: Cali Oliveira Jr., MD;  Location: [facility];  Service: Endoscopy;  Laterality: N/A;    COLONOSCOPY N/A 2022    Procedure: COLONOSCOPY;  Surgeon: Eli Kong MD;   Location: [facility];  Service: Endoscopy;  Laterality: N/A;    DILATION AND CURETTAGE OF UTERUS         Family History   Problem Relation Age of Onset    Dementia Mother     Parkinsonism Mother         PASSED AGE 78    Hypertension Father     Cancer Father         KIDNEY PASSED AGE 70    Heart disease Father 50        HEART ATTACK    Diabetes Father     Hypertension Sister     Diabetes Brother     Hypertension Brother     Diabetes Sister     Hypertension Sister     Hypertension Sister     Hypertension Brother     No Known Problems Daughter     No Known Problems Son     No Known Problems Son     Breast cancer Neg Hx     Colon cancer Neg Hx     Ovarian cancer Neg Hx        Social History     Socioeconomic History    Marital status:    Tobacco Use    Smoking status: Former     Current packs/day: 0.00     Average packs/day: 0.1 packs/day for 8.0 years (0.8 ttl pk-yrs)     Types: Cigarettes     Start date:      Quit date:      Years since quittin.9     Passive exposure: Never    Smokeless tobacco: Never    Tobacco comments:     socially only for around 20 years but not every day   Substance and Sexual Activity    Alcohol use: Yes     Comment: rare    Drug use: No    Sexual activity: Not Currently     Birth control/protection: Post-menopausal

## 2024-01-15 ENCOUNTER — PATIENT MESSAGE (OUTPATIENT)
Dept: FAMILY MEDICINE | Facility: CLINIC | Age: 64
End: 2024-01-15
Payer: COMMERCIAL

## 2024-01-15 DIAGNOSIS — E11.9 TYPE 2 DIABETES MELLITUS WITHOUT COMPLICATION, WITHOUT LONG-TERM CURRENT USE OF INSULIN: ICD-10-CM

## 2024-01-16 RX ORDER — METFORMIN HYDROCHLORIDE 500 MG/1
TABLET, EXTENDED RELEASE ORAL
Qty: 180 TABLET | Refills: 3 | Status: SHIPPED | OUTPATIENT
Start: 2024-01-16

## 2024-01-16 RX ORDER — METFORMIN HYDROCHLORIDE 500 MG/1
1000 TABLET, EXTENDED RELEASE ORAL
Qty: 180 TABLET | Refills: 3 | Status: SHIPPED | OUTPATIENT
Start: 2024-01-16 | End: 2025-01-15

## 2024-01-16 NOTE — TELEPHONE ENCOUNTER
Doris,    You are going to have to call in a verbal order to [facility] for 2 week supply of Metformin  mg TWO tablets daily #28.    I have to send prescription over computer to her mail-order pharmacy so if I try to send to both - it will cancel out the other one.    APRIL Traylor

## 2024-01-16 NOTE — TELEPHONE ENCOUNTER
Call medication into [facility]- spoke with Mark

## 2024-01-18 DIAGNOSIS — E78.5 HYPERLIPIDEMIA ASSOCIATED WITH TYPE 2 DIABETES MELLITUS: ICD-10-CM

## 2024-01-18 DIAGNOSIS — E11.69 HYPERLIPIDEMIA ASSOCIATED WITH TYPE 2 DIABETES MELLITUS: ICD-10-CM

## 2024-01-18 DIAGNOSIS — E11.59 HYPERTENSION ASSOCIATED WITH TYPE 2 DIABETES MELLITUS: ICD-10-CM

## 2024-01-18 DIAGNOSIS — I15.2 HYPERTENSION ASSOCIATED WITH TYPE 2 DIABETES MELLITUS: ICD-10-CM

## 2024-01-18 DIAGNOSIS — E03.9 ACQUIRED HYPOTHYROIDISM: ICD-10-CM

## 2024-01-19 RX ORDER — ROSUVASTATIN CALCIUM 10 MG/1
10 TABLET, COATED ORAL
Qty: 90 TABLET | Refills: 1 | Status: SHIPPED | OUTPATIENT
Start: 2024-01-19

## 2024-01-19 RX ORDER — AMLODIPINE BESYLATE 5 MG/1
5 TABLET ORAL
Qty: 90 TABLET | Refills: 1 | Status: SHIPPED | OUTPATIENT
Start: 2024-01-19

## 2024-01-19 RX ORDER — LEVOTHYROXINE SODIUM 112 UG/1
TABLET ORAL
Qty: 90 TABLET | Refills: 1 | Status: SHIPPED | OUTPATIENT
Start: 2024-01-19

## 2024-01-19 RX ORDER — LISINOPRIL AND HYDROCHLOROTHIAZIDE 20; 25 MG/1; MG/1
1 TABLET ORAL
Qty: 90 TABLET | Refills: 1 | Status: SHIPPED | OUTPATIENT
Start: 2024-01-19

## 2024-01-19 NOTE — TELEPHONE ENCOUNTER
Please see the attached refill request.   31 yo healthy white male, developed lower abdominal crampy pain approximately 7-hours ago which is associated with nausea and vomiting. Pain is non radiating without any fever, chills, diarrhea, melena or BRBPR. No prior surgeries.

## 2024-01-23 ENCOUNTER — TELEPHONE (OUTPATIENT)
Dept: FAMILY MEDICINE | Facility: CLINIC | Age: 64
End: 2024-01-23
Payer: COMMERCIAL

## 2024-01-23 DIAGNOSIS — M85.852 OSTEOPENIA OF LEFT FEMORAL NECK: Primary | ICD-10-CM

## 2024-01-23 RX ORDER — MULTIVITAMIN
1 TABLET ORAL 2 TIMES DAILY
Refills: 0 | COMMUNITY
Start: 2024-01-23

## 2024-01-23 NOTE — TELEPHONE ENCOUNTER
Please advise patient that DEXA Scan/Bone density test showed osteopenia.  Please start calcium with vitamin D bone supplement daily.  Recheck in 3 years.

## 2024-01-23 NOTE — TELEPHONE ENCOUNTER
----- Message from Gina Alcaraz sent at 1/23/2024  4:51 PM CST -----  Type:  Patient Returning Call    Who Called:pt  Who Left Message for Patient:Doris   Does the patient know what this is regarding?:yes   Would the patient rather a call back or a response via [employer]? Call   Best Call Back Number:[phone]  Additional Information:

## 2024-06-20 ENCOUNTER — OFFICE VISIT (OUTPATIENT)
Dept: FAMILY MEDICINE | Facility: CLINIC | Age: 64
End: 2024-06-20
Payer: COMMERCIAL

## 2024-06-20 VITALS
SYSTOLIC BLOOD PRESSURE: 130 MMHG | DIASTOLIC BLOOD PRESSURE: 72 MMHG | BODY MASS INDEX: 38.92 KG/M2 | HEIGHT: 61 IN | TEMPERATURE: 98 F | WEIGHT: 206.13 LBS | OXYGEN SATURATION: 98 % | HEART RATE: 80 BPM

## 2024-06-20 DIAGNOSIS — E11.69 HYPERLIPIDEMIA ASSOCIATED WITH TYPE 2 DIABETES MELLITUS: ICD-10-CM

## 2024-06-20 DIAGNOSIS — E78.5 HYPERLIPIDEMIA ASSOCIATED WITH TYPE 2 DIABETES MELLITUS: ICD-10-CM

## 2024-06-20 DIAGNOSIS — E66.01 CLASS 2 SEVERE OBESITY DUE TO EXCESS CALORIES WITH SERIOUS COMORBIDITY AND BODY MASS INDEX (BMI) OF 38.0 TO 38.9 IN ADULT: ICD-10-CM

## 2024-06-20 DIAGNOSIS — K76.0 NAFLD (NONALCOHOLIC FATTY LIVER DISEASE): ICD-10-CM

## 2024-06-20 DIAGNOSIS — G47.33 OSA ON CPAP: ICD-10-CM

## 2024-06-20 DIAGNOSIS — E11.59 HYPERTENSION ASSOCIATED WITH TYPE 2 DIABETES MELLITUS: ICD-10-CM

## 2024-06-20 DIAGNOSIS — E11.9 TYPE 2 DIABETES MELLITUS WITHOUT COMPLICATION, WITHOUT LONG-TERM CURRENT USE OF INSULIN: ICD-10-CM

## 2024-06-20 DIAGNOSIS — E03.9 ACQUIRED HYPOTHYROIDISM: ICD-10-CM

## 2024-06-20 DIAGNOSIS — M85.852 OSTEOPENIA OF LEFT FEMORAL NECK: ICD-10-CM

## 2024-06-20 DIAGNOSIS — I15.2 HYPERTENSION ASSOCIATED WITH TYPE 2 DIABETES MELLITUS: ICD-10-CM

## 2024-06-20 DIAGNOSIS — Z00.00 ANNUAL PHYSICAL EXAM: Primary | ICD-10-CM

## 2024-06-20 LAB
ALBUMIN/CREAT UR: NORMAL UG/MG (ref 0–30)
CREAT UR-MCNC: 80 MG/DL (ref 15–325)
MICROALBUMIN UR DL<=1MG/L-MCNC: <5 UG/ML

## 2024-06-20 PROCEDURE — 99397 PER PM REEVAL EST PAT 65+ YR: CPT | Mod: S$GLB,,, | Performed by: NURSE PRACTITIONER

## 2024-06-20 PROCEDURE — 99999 PR PBB SHADOW E&M-EST. PATIENT-LVL V: CPT | Mod: PBBFAC,,, | Performed by: NURSE PRACTITIONER

## 2024-06-20 PROCEDURE — 3288F FALL RISK ASSESSMENT DOCD: CPT | Mod: CPTII,S$GLB,, | Performed by: NURSE PRACTITIONER

## 2024-06-20 PROCEDURE — 3066F NEPHROPATHY DOC TX: CPT | Mod: CPTII,S$GLB,, | Performed by: NURSE PRACTITIONER

## 2024-06-20 PROCEDURE — 3078F DIAST BP <80 MM HG: CPT | Mod: CPTII,S$GLB,, | Performed by: NURSE PRACTITIONER

## 2024-06-20 PROCEDURE — 1101F PT FALLS ASSESS-DOCD LE1/YR: CPT | Mod: CPTII,S$GLB,, | Performed by: NURSE PRACTITIONER

## 2024-06-20 PROCEDURE — 3075F SYST BP GE 130 - 139MM HG: CPT | Mod: CPTII,S$GLB,, | Performed by: NURSE PRACTITIONER

## 2024-06-20 PROCEDURE — 3044F HG A1C LEVEL LT 7.0%: CPT | Mod: CPTII,S$GLB,, | Performed by: NURSE PRACTITIONER

## 2024-06-20 PROCEDURE — 82570 ASSAY OF URINE CREATININE: CPT | Performed by: NURSE PRACTITIONER

## 2024-06-20 PROCEDURE — 1159F MED LIST DOCD IN RCRD: CPT | Mod: CPTII,S$GLB,, | Performed by: NURSE PRACTITIONER

## 2024-06-20 PROCEDURE — 1126F AMNT PAIN NOTED NONE PRSNT: CPT | Mod: CPTII,S$GLB,, | Performed by: NURSE PRACTITIONER

## 2024-06-20 PROCEDURE — 3008F BODY MASS INDEX DOCD: CPT | Mod: CPTII,S$GLB,, | Performed by: NURSE PRACTITIONER

## 2024-06-20 PROCEDURE — 1160F RVW MEDS BY RX/DR IN RCRD: CPT | Mod: CPTII,S$GLB,, | Performed by: NURSE PRACTITIONER

## 2024-06-20 PROCEDURE — 3061F NEG MICROALBUMINURIA REV: CPT | Mod: CPTII,S$GLB,, | Performed by: NURSE PRACTITIONER

## 2024-06-20 PROCEDURE — 4010F ACE/ARB THERAPY RXD/TAKEN: CPT | Mod: CPTII,S$GLB,, | Performed by: NURSE PRACTITIONER

## 2024-06-20 NOTE — PROGRESS NOTES
"Subjective:       Patient ID: Digna Bhatia is a 63 y.o. female.    Chief Complaint: Annual Exam    HPI    Patient is a 63-year-old white female with Type 2 Diabetes definitively diagnosed in August 2019 but Prediabetic since October 2018, hypertension, hyperlipidemia, hypothyroidism, fatty liver disease noted on ultrasound May 2017 with elevated liver enzymes, Osteopenia and obstructive sleep apnea with CPAP use that is here today for ANNUAL physical exam with fasting lab results.          TYPE 2 DIABETES  Currently taking  Metformin XR 1000 mg daily   FBG is now 128 with HgbA1C 6.2%.   Discussed change to Ozempic - patient is still trying to decide if she wants a change in medication - will return to office if she decides she wants to change medication before next visit.  Recheck in 6 months             Hyperlipidemia   controlled on Rosuvastatin 10 mg daily.   LDL 68  Goal <70 - met  Stay on same medication  Recheck in 1 year             Hypertension  Currently controlled on present medications, amlodipine 5 mg daily and lisinopril HCT 20/25 mg daily.  /72   Pulse 80   Temp 98.1 °F (36.7 °C)   Ht 5' 1" (1.549 m)   Wt 93.5 kg (206 lb 2.1 oz)   LMP  (LMP Unknown)   SpO2 98%   BMI 38.95 kg/m²   Stable.  Recheck in 6 months.           Hypothyroidism   Currently taking Levothyroxine 112 mcg daily  NOT taking correctly - she has been taking with all her other medications and not on an empty stomach  TSH is mildly high but not taking medication correctly  Will start taking medication correctly  Recheck in 6 months.          Obesity  Body mass index is 38.95 kg/m².  Working on lifestyle modifications.       Fatty Liver Disease/Gilbert's syndrome  Seen on abdominal ultrasound 8/16/2017  Evaluated by GI Dr. Oliveira in 2017  Chronic elevation bilirubin (Gilbert's Syndrome)  AST/ALT within range           Obstructive sleep apnea   Intermittent use of CPAP machine.    Osteopenia   DEXA scan 1/22/24:  Lumbar " spine normal bone density.  Left femoral neck osteopenia.  The left femoral neck bone mineral density is equal to 0.794 g/cm squared with a T score of -1.8.   Advised to start Calcium with Vitamin D supplement  Gave handouts on calcium and vitamin D supplementation.      Gait Abnormality with Chronic Right Ankle Swelling  Patient reported she started noting that her right foot turns INWARD when she walks and has started having more pain to the foot.    She has CHRONIC Right outer ankle swelling for multiple years that states occurs after an injury years ago.  Referred to podiatry to further evaluate the gait abnormality.  Seen Dr. Owens - he advised the right ankle swelling secondary to a LIPOMA  Then seen Dr. Zapata - diagnosed as Posterior tibial tendon dysfunction (PTTD) of both lower extremities   Sent to Physical Therapy - now doing home therapy for flare-ups  MOSTLY RESOLVED      Wellness Labs:  CBC WNL  CMP with  and A1C 6.2%; kidney and liver function okay  TSH discussed above  Cholesterol stable    Health Maintenance:  Advised that eye exam due July 2024.    Office Visit on 06/20/2024   Component Date Value Ref Range Status    Microalbumin, Urine 06/20/2024 <5.0  ug/mL Final    Creatinine, Urine 06/20/2024 80.0  15.0 - 325.0 mg/dL Final    Microalb/Creat Ratio 06/20/2024 Unable to calculate  0.0 - 30.0 ug/mg Final   Lab Visit on 06/17/2024   Component Date Value Ref Range Status    WBC 06/17/2024 6.69  3.90 - 12.70 K/uL Final    RBC 06/17/2024 4.28  4.00 - 5.40 M/uL Final    Hemoglobin 06/17/2024 12.9  12.0 - 16.0 g/dL Final    Hematocrit 06/17/2024 36.8 (L)  37.0 - 48.5 % Final    MCV 06/17/2024 86  82 - 98 fL Final    MCH 06/17/2024 30.1  27.0 - 31.0 pg Final    MCHC 06/17/2024 35.1  32.0 - 36.0 g/dL Final    RDW 06/17/2024 13.2  11.5 - 14.5 % Final    Platelets 06/17/2024 243  150 - 450 K/uL Final    MPV 06/17/2024 10.3  9.2 - 12.9 fL Final    Immature Granulocytes 06/17/2024 0.6 (H)  0.0 - 0.5 %  Final    Gran # (ANC) 06/17/2024 3.9  1.8 - 7.7 K/uL Final    Immature Grans (Abs) 06/17/2024 0.04  0.00 - 0.04 K/uL Final    Comment: Mild elevation in immature granulocytes is non specific and   can be seen in a variety of conditions including stress response,   acute inflammation, trauma and pregnancy. Correlation with other   laboratory and clinical findings is essential.      Lymph # 06/17/2024 1.7  1.0 - 4.8 K/uL Final    Mono # 06/17/2024 0.7  0.3 - 1.0 K/uL Final    Eos # 06/17/2024 0.3  0.0 - 0.5 K/uL Final    Baso # 06/17/2024 0.12  0.00 - 0.20 K/uL Final    nRBC 06/17/2024 0  0 /100 WBC Final    Gran % 06/17/2024 58.4  38.0 - 73.0 % Final    Lymph % 06/17/2024 25.1  18.0 - 48.0 % Final    Mono % 06/17/2024 10.5  4.0 - 15.0 % Final    Eosinophil % 06/17/2024 4.2  0.0 - 8.0 % Final    Basophil % 06/17/2024 1.8  0.0 - 1.9 % Final    Differential Method 06/17/2024 Automated   Final    Sodium 06/17/2024 138  136 - 145 mmol/L Final    Potassium 06/17/2024 4.1  3.5 - 5.1 mmol/L Final    Chloride 06/17/2024 106  95 - 110 mmol/L Final    CO2 06/17/2024 23  23 - 29 mmol/L Final    Glucose 06/17/2024 128 (H)  70 - 110 mg/dL Final    BUN 06/17/2024 15  8 - 23 mg/dL Final    Creatinine 06/17/2024 0.9  0.5 - 1.4 mg/dL Final    Calcium 06/17/2024 9.1  8.7 - 10.5 mg/dL Final    Total Protein 06/17/2024 6.4  6.0 - 8.4 g/dL Final    Albumin 06/17/2024 3.8  3.5 - 5.2 g/dL Final    Total Bilirubin 06/17/2024 1.6 (H)  0.1 - 1.0 mg/dL Final    Comment: For infants and newborns, interpretation of results should be based  on gestational age, weight and in agreement with clinical  observations.    Premature Infant recommended reference ranges:  Up to 24 hours.............<8.0 mg/dL  Up to 48 hours............<12.0 mg/dL  3-5 days..................<15.0 mg/dL  6-29 days.................<15.0 mg/dL      Alkaline Phosphatase 06/17/2024 74  55 - 135 U/L Final    AST 06/17/2024 17  10 - 40 U/L Final    ALT 06/17/2024 21  10 - 44 U/L  Final    eGFR 06/17/2024 >60.0  >60 mL/min/1.73 m^2 Final    Anion Gap 06/17/2024 9  8 - 16 mmol/L Final    Hemoglobin A1C 06/17/2024 6.2 (H)  4.0 - 5.6 % Final    Comment: ADA Screening Guidelines:  5.7-6.4%  Consistent with prediabetes  >or=6.5%  Consistent with diabetes    High levels of fetal hemoglobin interfere with the HbA1C  assay. Heterozygous hemoglobin variants (HbS, HgC, etc)do  not significantly interfere with this assay.   However, presence of multiple variants may affect accuracy.      Estimated Avg Glucose 06/17/2024 131  68 - 131 mg/dL Final    Cholesterol 06/17/2024 137  120 - 199 mg/dL Final    Comment: The National Cholesterol Education Program (NCEP) has set the  following guidelines (reference ranges) for Cholesterol:  Optimal.....................<200 mg/dL  Borderline High.............200-239 mg/dL  High........................> or = 240 mg/dL      Triglycerides 06/17/2024 90  30 - 150 mg/dL Final    Comment: The National Cholesterol Education Program (NCEP) has set the  following guidelines (reference values) for triglycerides:  Normal......................<150 mg/dL  Borderline High.............150-199 mg/dL  High........................200-499 mg/dL      HDL 06/17/2024 51  40 - 75 mg/dL Final    Comment: The National Cholesterol Education Program (NCEP) has set the  following guidelines (reference values) for HDL Cholesterol:  Low...............<40 mg/dL  Optimal...........>60 mg/dL      LDL Cholesterol 06/17/2024 68.0  63.0 - 159.0 mg/dL Final    Comment: The National Cholesterol Education Program (NCEP) has set the  following guidelines (reference values) for LDL Cholesterol:  Optimal.......................<130 mg/dL  Borderline High...............130-159 mg/dL  High..........................160-189 mg/dL  Very High.....................>190 mg/dL      HDL/Cholesterol Ratio 06/17/2024 37.2  20.0 - 50.0 % Final    Total Cholesterol/HDL Ratio 06/17/2024 2.7  2.0 - 5.0 Final    Non-HDL  "Cholesterol 06/17/2024 86  mg/dL Final    Comment: Risk category and Non-HDL cholesterol goals:  Coronary heart disease (CHD)or equivalent (10-year risk of CHD >20%):  Non-HDL cholesterol goal     <130 mg/dL  Two or more CHD risk factors and 10-year risk of CHD <= 20%:  Non-HDL cholesterol goal     <160 mg/dL  0 to 1 CHD risk factor:  Non-HDL cholesterol goal     <190 mg/dL      TSH 06/17/2024 3.399  0.400 - 4.000 uIU/mL Final    Free T4 06/17/2024 0.93  0.71 - 1.51 ng/dL Final         Vitals:    06/20/24 1342 06/20/24 1357   BP: 138/76 130/72   BP Location: Left arm    Patient Position: Sitting    BP Method: Large (Manual)    Pulse: 80    Temp: 98.1 °F (36.7 °C)    SpO2: 98%    Weight: 93.5 kg (206 lb 2.1 oz)    Height: 5' 1" (1.549 m)        Assessment:         ICD-10-CM ICD-9-CM   1. Annual physical exam  Z00.00 V70.0   2. Type 2 diabetes mellitus without complication, without long-term current use of insulin  E11.9 250.00   3. Hypertension associated with type 2 diabetes mellitus  E11.59 250.80    I15.2 401.9   4. Hyperlipidemia associated with type 2 diabetes mellitus  E11.69 250.80    E78.5 272.4   5. Class 2 severe obesity due to excess calories with serious comorbidity and body mass index (BMI) of 38.0 to 38.9 in adult  E66.01 278.01    Z68.38 V85.38   6. NAFLD (nonalcoholic fatty liver disease)  K76.0 571.8   7. Acquired hypothyroidism  E03.9 244.9   8. KELLY on CPAP  G47.33 327.23   9. Osteopenia of left femoral neck  M85.852 733.90       Plan:       1. Annual physical exam   Health Maintenance Summary   Full History      Expand All  Collapse All  Overdue - RSV Vaccine (Age 60+ and Pregnant patients)   (1 - 1-dose 60+ series)Never done  No completion history exists for this topic.   Ordered - Foot Exam   (Yearly)Ordered on 7/12/2023  06/15/2023  Done   06/15/2023  SmartData: WORKFLOW - DIABETES - DIABETIC FOOT EXAM PERFORMED   06/10/2022  Done   06/10/2022  SmartData: WORKFLOW - DIABETES - DIABETIC FOOT " EXAM PERFORMED   06/01/2021  SmartData: WORKFLOW - DIABETES - DIABETIC FOOT EXAM PERFORMED   View More History   Eye Exam   (Yearly)Due soon on 7/18/2024 07/18/2023   DIABETES EYE EXAM   07/12/2022   DIABETES EYE EXAM   10/27/2020   DIABETES EYE EXAM   06/18/2019  SmartData: WORKFLOW - HEALTHY PLANET - EXTERNAL DATA - EXTERNAL PROCEDURES DATE - EYE EXAM   Postponed - COVID-19 Vaccine   (3 - 2023-24 season)Postponed until 12/18/2024 04/21/2021  Imm Admin: COVID-19, MRNA, LN-S, PF (MODERNA FULL 0.5 ML DOSE)   03/24/2021  Imm Admin: COVID-19, MRNA, LN-S, PF (MODERNA FULL 0.5 ML DOSE)   Scheduled - Hemoglobin A1c   (Every 6 Months)Scheduled for 12/5/2024 06/17/2024  Hemoglobin A1C External component of Hemoglobin A1C   12/11/2023  Hemoglobin A1C External component of HEMOGLOBIN A1C   06/13/2023  Hemoglobin A1C External component of Hemoglobin A1C   12/06/2022  Hemoglobin A1C External component of Hemoglobin A1C   06/06/2022  Hemoglobin A1C External component of Hemoglobin A1C   View More History   Mammogram   (Yearly)Next due on 1/22/2025 01/22/2024  Mammo Digital Screening Bilat w/ Mesfin   01/17/2023  Mammo Digital Screening Bilat w/ Mesfin   01/14/2022  [facility] MAMMOGRAPHY   01/14/2022  Mammo Previous   10/16/2020   MAMMOGRAPHY   View More History   Lipid Panel   (Yearly)Next due on 6/17/2025 06/17/2024  Cholesterol Total component of Lipid Panel   06/13/2023  Cholesterol Total component of Lipid Panel   12/06/2022  Cholesterol Total component of Lipid Panel   06/06/2022  Cholesterol Total component of Lipid Panel   12/03/2021  Cholesterol Total component of Lipid Panel   View More History   Low Dose Statin   (Yearly)Next due on 6/20/2025 06/20/2024  Registry Metric: Last Current Statin Reviewed Date   01/19/2024  Registry Metric: Last Current Statin Order Date   Diabetes Urine Screening   (Yearly)Next due on 6/20/2025 06/20/2024  MICROALB/CREAT RATIO component of Microalbumin/Creatinine Ratio, Urine    06/15/2023  MICROALB/CREAT RATIO component of Microalbumin/Creatinine Ratio, Urine   06/06/2022  MICROALB/CREAT RATIO component of Microalbumin/Creatinine Ratio, Urine   05/28/2021  MICROALB/CREAT RATIO component of Microalbumin/creatinine urine ratio   Colorectal Cancer Screening   (Colonoscopy - Every 3 Years)Next due on 8/5/2025 08/05/2022  Colonoscopy   08/05/2022  Surgical Procedure: COLONOSCOPY   07/29/2016  Colonoscopy   07/29/2016  Surgical Procedure: COLONOSCOPY   Cervical Cancer Screening   (HPV/Cotest - Preferred)Next due on 2/18/2026 06/30/2022  Pap Smear, Thin Prep with Reflex to HPV   02/18/2021  Multiple components of HPV High Risk Genotypes, PCR   02/18/2021  Liquid-Based Pap Smear, Screening   08/23/2018  Liquid-based pap smear, screening   03/02/2017  SmartData: EXTERNAL LAB DATE - HPV TESTING   View More History   TETANUS VACCINE   (Every 10 Years)Next due on 7/26/2026 07/26/2016  Imm Admin: Tdap   DEXA Scan   (Every 3 Years)Next due on 1/22/2027 01/22/2024  DXA Bone Density Axial Skeleton 1 or more sites   Hepatitis C Screening  Completed  05/05/2017  Hepatitis C Ab component of Hepatitis panel, acute   07/21/2016  Hepatitis C Ab component of Hepatitis C antibody   Shingles Vaccine   (Series Information)Completed  03/10/2020  Imm Admin: Zoster Recombinant   11/22/2019  Imm Admin: Zoster Recombinant   HIV Screening  Completed  11/24/2020  HIV 1/2 Ag/Ab (4th Gen)   Pneumococcal Vaccines (Age 65+)   (Series Information)Completed  12/09/2022  Imm Admin: Pneumococcal Conjugate - 20 Valent   08/09/2019  Imm Admin: Pneumococcal Polysaccharide - 23 Valent   Influenza Vaccine   (Series Information)Completed  11/04/2023  Done - [facility]   11/03/2022  Imm Admin: Influenza - Quadrivalent - MDCK - PF   10/22/2021  Imm Admin: Influenza - Quadrivalent - PF *Preferred* (6 months and older)   09/11/2020  Imm Admin: Influenza - Quadrivalent - PF *Preferred* (6 months and older)   10/14/2019  Imm  "Admin: Influenza - Quadrivalent - PF *Preferred* (6 months and older)   View More History       2. Type 2 diabetes mellitus without complication, without long-term current use of insulin  Overview:  Currently taking  Metformin XR 1000 mg daily   FBG is now 128 with HgbA1C 6.2%.   Discussed change to Ozempic - patient is still trying to decide if she wants a change in medication - will return to office if she decides she wants to change medication before next visit.  Recheck in 6 months      Orders:  -     Microalbumin/Creatinine Ratio, Urine  -     Comprehensive Metabolic Panel; Future; Expected date: 06/20/2024  -     Hemoglobin A1C; Future; Expected date: 06/20/2024    3. Hypertension associated with type 2 diabetes mellitus  Overview:  Currently controlled on present medications, amlodipine 5 mg daily and lisinopril HCT 20/25 mg daily.  /72   Pulse 80   Temp 98.1 °F (36.7 °C)   Ht 5' 1" (1.549 m)   Wt 93.5 kg (206 lb 2.1 oz)   LMP  (LMP Unknown)   SpO2 98%   BMI 38.95 kg/m²   Stable.  Recheck in 6 months.      4. Hyperlipidemia associated with type 2 diabetes mellitus  Overview:  controlled on Rosuvastatin 10 mg daily.   LDL 68  Goal <70 - met  Stay on same medication  Recheck in 1 year         5. Class 2 severe obesity due to excess calories with serious comorbidity and body mass index (BMI) of 38.0 to 38.9 in adult  Overview:  Body mass index is 38.95 kg/m².  Working on lifestyle modifications.      6. NAFLD (nonalcoholic fatty liver disease)  Overview:  Fatty Liver Disease/Gilbert's syndrome  Seen on abdominal ultrasound 8/16/2017  Evaluated by GI Dr. Oliveira in 2017  Chronic elevation bilirubin (Gilbert's Syndrome)  AST/ALT within range        7. Acquired hypothyroidism  Overview:  Currently taking Levothyroxine 112 mcg daily  NOT taking correctly - she has been taking with all her other medications and not on an empty stomach  TSH is mildly high but not taking medication correctly  Will start " taking medication correctly  Recheck in 6 months.      Orders:  -     TSH; Future; Expected date: 06/20/2024  -     T4, Free; Future; Expected date: 06/20/2024  -     T3; Future; Expected date: 06/20/2024    8. KELLY on CPAP  Overview:  Intermittent use of CPAP machine.      9. Osteopenia of left femoral neck  Overview:  DEXA scan 1/22/24:  Lumbar spine normal bone density.  Left femoral neck osteopenia.  The left femoral neck bone mineral density is equal to 0.794 g/cm squared with a T score of -1.8.   Advised to start Calcium with Vitamin D supplement  Gave handouts on calcium and vitamin D supplementation.  Repeat in January 2027         Follow up in about 6 months (around 12/20/2024) for fasting labs and follow up.     Patient's Medications   New Prescriptions    No medications on file   Previous Medications    AMLODIPINE (NORVASC) 5 MG TABLET    TAKE 1 TABLET DAILY    ASPIRIN (ECOTRIN) 81 MG EC TABLET    Take 81 mg by mouth once daily.    CALCIUM-VITAMIN D 600 MG-10 MCG (400 UNIT) TAB    Take 1 tablet by mouth 2 (two) times a day.    DUOBRII 0.01-0.045 % LOTN    Apply topically.    LEVOTHYROXINE (SYNTHROID) 112 MCG TABLET    TAKE 1 TABLET DAILY BEFORE BREAKFAST    LISINOPRIL-HYDROCHLOROTHIAZIDE (PRINZIDE,ZESTORETIC) 20-25 MG TAB    TAKE 1 TABLET DAILY    METFORMIN (GLUCOPHAGE-XR) 500 MG ER 24HR TABLET    Take 2 tablets (1,000 mg total) by mouth daily with breakfast.    ROSUVASTATIN (CRESTOR) 10 MG TABLET    TAKE 1 TABLET DAILY   Modified Medications    No medications on file   Discontinued Medications    METFORMIN (GLUCOPHAGE-XR) 500 MG ER 24HR TABLET    TAKE 2 TABLETS DAILY WITH BREAKFAST         Review of Systems   HENT: Negative.     Respiratory: Negative.     Cardiovascular: Negative.    Gastrointestinal: Negative.          Objective:        Physical Exam  Constitutional:       General: She is not in acute distress.     Appearance: She is well-developed. She is obese. She is not ill-appearing, toxic-appearing  or diaphoretic.      Comments: + obesity. Body mass index is 38.95 kg/m².     HENT:      Head: Normocephalic and atraumatic.      Right Ear: External ear normal.      Left Ear: External ear normal.   Eyes:      General: No scleral icterus.        Right eye: No discharge.         Left eye: No discharge.      Extraocular Movements: Extraocular movements intact.      Conjunctiva/sclera: Conjunctivae normal.   Neck:      Thyroid: No thyromegaly.      Vascular: No JVD.      Trachea: No tracheal deviation.   Cardiovascular:      Rate and Rhythm: Normal rate and regular rhythm.      Pulses:           Dorsalis pedis pulses are 2+ on the right side and 2+ on the left side.      Heart sounds: Normal heart sounds. No murmur heard.  Pulmonary:      Effort: Pulmonary effort is normal. No respiratory distress.      Breath sounds: Normal breath sounds. No stridor. No wheezing or rales.   Abdominal:      General: There is no distension.   Musculoskeletal:         General: Normal range of motion.      Cervical back: Normal range of motion and neck supple.   Feet:      Right foot:      Protective Sensation: 7 sites tested.  7 sites sensed.      Skin integrity: No ulcer or skin breakdown.      Left foot:      Protective Sensation: 7 sites tested.  7 sites sensed.      Skin integrity: No ulcer or skin breakdown.   Lymphadenopathy:      Cervical: No cervical adenopathy.   Skin:     General: Skin is warm and dry.      Findings: No rash.   Neurological:      Mental Status: She is alert and oriented to person, place, and time.      Cranial Nerves: No cranial nerve deficit.      Coordination: Coordination normal.   Psychiatric:         Mood and Affect: Mood normal.         Behavior: Behavior normal.         Thought Content: Thought content normal.         Judgment: Judgment normal.             Past Medical History:   Diagnosis Date    H/O mammogram 03/31/2016    has done at [facility]    Hyperlipidemia 09/12/2017    Hypertension     Hypothyroidism      IFG (impaired fasting glucose)     NAFLD (nonalcoholic fatty liver disease)     KELLY (obstructive sleep apnea)     on CPAP    KELLY on CPAP     Screening for colorectal cancer 2016    Type 2 diabetes mellitus without complication, without long-term current use of insulin 2019       Past Surgical History:   Procedure Laterality Date     SECTION      COLONOSCOPY N/A 2016    Procedure: COLONOSCOPY-Miralax split prep ;  Surgeon: Cali Oliveira Jr., MD;  Location: [facility] ENDO;  Service: Endoscopy;  Laterality: N/A;    COLONOSCOPY N/A 2022    Procedure: COLONOSCOPY;  Surgeon: Eli Kong MD;  Location: [facility] ENDO;  Service: Endoscopy;  Laterality: N/A;    DILATION AND CURETTAGE OF UTERUS         Family History   Problem Relation Name Age of Onset    Dementia Mother      Parkinsonism Mother          PASSED AGE 78    Hypertension Father      Cancer Father          KIDNEY PASSED AGE 70    Heart disease Father  50        HEART ATTACK    Diabetes Father      Hypertension Sister      Diabetes Brother      Hypertension Brother      Diabetes Sister      Hypertension Sister      Hypertension Sister      Hypertension Brother      No Known Problems Daughter      No Known Problems Son      No Known Problems Son      Breast cancer Neg Hx      Colon cancer Neg Hx      Ovarian cancer Neg Hx         Social History     Socioeconomic History    Marital status:    Tobacco Use    Smoking status: Former     Current packs/day: 0.00     Average packs/day: 0.1 packs/day for 8.0 years (0.8 ttl pk-yrs)     Types: Cigarettes     Start date:      Quit date:      Years since quittin.4     Passive exposure: Never    Smokeless tobacco: Never    Tobacco comments:     socially only for around 20 years but not every day   Substance and Sexual Activity    Alcohol use: Yes     Comment: rare    Drug use: No    Sexual activity: Not Currently     Birth control/protection: Post-menopausal

## 2024-06-21 PROBLEM — R53.1 WEAKNESS: Status: RESOLVED | Noted: 2023-10-23 | Resolved: 2024-06-21

## 2024-06-21 PROBLEM — R26.9 GAIT ABNORMALITY: Status: RESOLVED | Noted: 2023-10-23 | Resolved: 2024-06-21

## 2024-06-21 PROBLEM — M79.671 HEEL PAIN, BILATERAL: Status: RESOLVED | Noted: 2023-10-23 | Resolved: 2024-06-21

## 2024-06-21 PROBLEM — M79.672 HEEL PAIN, BILATERAL: Status: RESOLVED | Noted: 2023-10-23 | Resolved: 2024-06-21

## 2024-07-11 ENCOUNTER — OFFICE VISIT (OUTPATIENT)
Dept: FAMILY MEDICINE | Facility: CLINIC | Age: 64
End: 2024-07-11
Payer: COMMERCIAL

## 2024-07-11 VITALS
HEIGHT: 61 IN | TEMPERATURE: 98 F | DIASTOLIC BLOOD PRESSURE: 72 MMHG | BODY MASS INDEX: 39.3 KG/M2 | HEART RATE: 77 BPM | SYSTOLIC BLOOD PRESSURE: 110 MMHG | OXYGEN SATURATION: 98 % | WEIGHT: 208.13 LBS

## 2024-07-11 DIAGNOSIS — E11.9 TYPE 2 DIABETES MELLITUS WITHOUT COMPLICATION, WITHOUT LONG-TERM CURRENT USE OF INSULIN: Primary | ICD-10-CM

## 2024-07-11 PROCEDURE — 4010F ACE/ARB THERAPY RXD/TAKEN: CPT | Mod: CPTII,S$GLB,, | Performed by: NURSE PRACTITIONER

## 2024-07-11 PROCEDURE — 3288F FALL RISK ASSESSMENT DOCD: CPT | Mod: CPTII,S$GLB,, | Performed by: NURSE PRACTITIONER

## 2024-07-11 PROCEDURE — 1160F RVW MEDS BY RX/DR IN RCRD: CPT | Mod: CPTII,S$GLB,, | Performed by: NURSE PRACTITIONER

## 2024-07-11 PROCEDURE — 99999 PR PBB SHADOW E&M-EST. PATIENT-LVL IV: CPT | Mod: PBBFAC,,, | Performed by: NURSE PRACTITIONER

## 2024-07-11 PROCEDURE — 3078F DIAST BP <80 MM HG: CPT | Mod: CPTII,S$GLB,, | Performed by: NURSE PRACTITIONER

## 2024-07-11 PROCEDURE — 3061F NEG MICROALBUMINURIA REV: CPT | Mod: CPTII,S$GLB,, | Performed by: NURSE PRACTITIONER

## 2024-07-11 PROCEDURE — 1101F PT FALLS ASSESS-DOCD LE1/YR: CPT | Mod: CPTII,S$GLB,, | Performed by: NURSE PRACTITIONER

## 2024-07-11 PROCEDURE — 99214 OFFICE O/P EST MOD 30 MIN: CPT | Mod: S$GLB,,, | Performed by: NURSE PRACTITIONER

## 2024-07-11 PROCEDURE — 3008F BODY MASS INDEX DOCD: CPT | Mod: CPTII,S$GLB,, | Performed by: NURSE PRACTITIONER

## 2024-07-11 PROCEDURE — 1159F MED LIST DOCD IN RCRD: CPT | Mod: CPTII,S$GLB,, | Performed by: NURSE PRACTITIONER

## 2024-07-11 PROCEDURE — 3074F SYST BP LT 130 MM HG: CPT | Mod: CPTII,S$GLB,, | Performed by: NURSE PRACTITIONER

## 2024-07-11 PROCEDURE — 3066F NEPHROPATHY DOC TX: CPT | Mod: CPTII,S$GLB,, | Performed by: NURSE PRACTITIONER

## 2024-07-11 PROCEDURE — 3044F HG A1C LEVEL LT 7.0%: CPT | Mod: CPTII,S$GLB,, | Performed by: NURSE PRACTITIONER

## 2024-07-11 PROCEDURE — 1126F AMNT PAIN NOTED NONE PRSNT: CPT | Mod: CPTII,S$GLB,, | Performed by: NURSE PRACTITIONER

## 2024-07-11 RX ORDER — SEMAGLUTIDE 0.68 MG/ML
INJECTION, SOLUTION SUBCUTANEOUS
Qty: 9 ML | Refills: 0 | Status: SHIPPED | OUTPATIENT
Start: 2024-07-11 | End: 2024-10-03

## 2024-07-11 NOTE — PROGRESS NOTES
"Subjective:       Patient ID: Digna Bhatia is a 63 y.o. female.    Chief Complaint: Diabetes (Discuss changing Metformin to Ozempic)    HPI    Patient is a 63-year-old white female with Type 2 Diabetes definitively diagnosed in August 2019 but Prediabetic since October 2018, hypertension, hyperlipidemia, hypothyroidism, fatty liver disease noted on ultrasound May 2017 with elevated liver enzymes, Osteopenia and obstructive sleep apnea with CPAP use that is here today for ANNUAL physical exam with fasting lab results.          TYPE 2 DIABETES  Currently taking  Metformin XR 1000 mg daily   FBG is now 128 with HgbA1C 6.2%.   Discussed change to Ozempic - patient is requesting change in medication  Stay on the Metformin XR 1000 mg daily for 4 weeks and then stop  Start Ozempic 0.25 mg injection weekly for 4 weeks  Then increase to Ozempic 0.5 mg injection weekly for 8 weeks  Repeat labs and follow up in 12 weeks.      Vitals:    07/11/24 0854   BP: 110/72   BP Location: Left arm   Patient Position: Sitting   BP Method: Large (Manual)   Pulse: 77   Temp: 98.1 °F (36.7 °C)   TempSrc: Temporal   SpO2: 98%   Weight: 94.4 kg (208 lb 1.8 oz)   Height: 5' 1" (1.549 m)       Assessment:         ICD-10-CM ICD-9-CM   1. Type 2 diabetes mellitus without complication, without long-term current use of insulin  E11.9 250.00       Plan:       1. Type 2 diabetes mellitus without complication, without long-term current use of insulin  Overview:  Currently taking  Metformin XR 1000 mg daily   FBG is now 128 with HgbA1C 6.2%.   Discussed change to Ozempic - patient is requesting change in medication  Stay on the Metformin XR 1000 mg daily for 4 weeks and then stop  Start Ozempic 0.25 mg injection weekly for 4 weeks  Then increase to Ozempic 0.5 mg injection weekly for 8 weeks  Repeat labs and follow up in 12 weeks.      Orders:  -     semaglutide (OZEMPIC) 0.25 mg or 0.5 mg (2 mg/3 mL) pen injector; Inject 0.25 mg into the skin every " 7 days for 28 days, THEN 0.5 mg every 7 days.  Dispense: 9 mL; Refill: 0       Follow up in about 12 weeks (around 10/3/2024) for fasting labs and follow up.     Patient's Medications   New Prescriptions    SEMAGLUTIDE (OZEMPIC) 0.25 MG OR 0.5 MG (2 MG/3 ML) PEN INJECTOR    Inject 0.25 mg into the skin every 7 days for 28 days, THEN 0.5 mg every 7 days.   Previous Medications    AMLODIPINE (NORVASC) 5 MG TABLET    TAKE 1 TABLET DAILY    ASPIRIN (ECOTRIN) 81 MG EC TABLET    Take 81 mg by mouth once daily.    CALCIUM-VITAMIN D 600 MG-10 MCG (400 UNIT) TAB    Take 1 tablet by mouth 2 (two) times a day.    DUOBRII 0.01-0.045 % LOTN    Apply topically.    LEVOTHYROXINE (SYNTHROID) 112 MCG TABLET    TAKE 1 TABLET DAILY BEFORE BREAKFAST    LISINOPRIL-HYDROCHLOROTHIAZIDE (PRINZIDE,ZESTORETIC) 20-25 MG TAB    TAKE 1 TABLET DAILY    METFORMIN (GLUCOPHAGE-XR) 500 MG ER 24HR TABLET    Take 2 tablets (1,000 mg total) by mouth daily with breakfast.    ROSUVASTATIN (CRESTOR) 10 MG TABLET    TAKE 1 TABLET DAILY   Modified Medications    No medications on file   Discontinued Medications    No medications on file         Review of Systems      Objective:        Physical Exam        Past Medical History:   Diagnosis Date    H/O mammogram 2016    has done at [facility] [place]    Hyperlipidemia 2017    Hypertension     Hypothyroidism     IFG (impaired fasting glucose)     NAFLD (nonalcoholic fatty liver disease)     KELLY (obstructive sleep apnea)     on CPAP    KELLY on CPAP     Screening for colorectal cancer 2016    Type 2 diabetes mellitus without complication, without long-term current use of insulin 2019       Past Surgical History:   Procedure Laterality Date     SECTION      COLONOSCOPY N/A 2016    Procedure: COLONOSCOPY-Miralax split prep ;  Surgeon: Cali Oliveira Jr., MD;  Location: [facility];  Service: Endoscopy;  Laterality: N/A;    COLONOSCOPY N/A 2022    Procedure: COLONOSCOPY;  Surgeon:  Eli Kong MD;  Location: [facility];  Service: Endoscopy;  Laterality: N/A;    DILATION AND CURETTAGE OF UTERUS         Family History   Problem Relation Name Age of Onset    Dementia Mother      Parkinsonism Mother          PASSED AGE 78    Hypertension Father      Cancer Father          KIDNEY PASSED AGE 70    Heart disease Father  50        HEART ATTACK    Diabetes Father      Hypertension Sister      Diabetes Brother      Hypertension Brother      Diabetes Sister      Hypertension Sister      Hypertension Sister      Hypertension Brother      No Known Problems Daughter      No Known Problems Son      No Known Problems Son      Breast cancer Neg Hx      Colon cancer Neg Hx      Ovarian cancer Neg Hx         Social History     Socioeconomic History    Marital status:    Tobacco Use    Smoking status: Former     Current packs/day: 0.00     Average packs/day: 0.1 packs/day for 8.0 years (0.8 ttl pk-yrs)     Types: Cigarettes     Start date:      Quit date:      Years since quittin.5     Passive exposure: Never    Smokeless tobacco: Never    Tobacco comments:     socially only for around 20 years but not every day   Substance and Sexual Activity    Alcohol use: Yes     Comment: rare    Drug use: No    Sexual activity: Not Currently     Birth control/protection: Post-menopausal

## 2024-07-23 LAB
LEFT EYE DM RETINOPATHY: NEGATIVE
RIGHT EYE DM RETINOPATHY: NEGATIVE

## 2024-07-24 ENCOUNTER — PATIENT OUTREACH (OUTPATIENT)
Dept: ADMINISTRATIVE | Facility: HOSPITAL | Age: 64
End: 2024-07-24
Payer: COMMERCIAL

## 2024-08-13 DIAGNOSIS — E78.5 HYPERLIPIDEMIA ASSOCIATED WITH TYPE 2 DIABETES MELLITUS: ICD-10-CM

## 2024-08-13 DIAGNOSIS — E03.9 ACQUIRED HYPOTHYROIDISM: ICD-10-CM

## 2024-08-13 DIAGNOSIS — E11.59 HYPERTENSION ASSOCIATED WITH TYPE 2 DIABETES MELLITUS: ICD-10-CM

## 2024-08-13 DIAGNOSIS — E11.69 HYPERLIPIDEMIA ASSOCIATED WITH TYPE 2 DIABETES MELLITUS: ICD-10-CM

## 2024-08-13 DIAGNOSIS — I15.2 HYPERTENSION ASSOCIATED WITH TYPE 2 DIABETES MELLITUS: ICD-10-CM

## 2024-08-14 RX ORDER — ROSUVASTATIN CALCIUM 10 MG/1
10 TABLET, COATED ORAL DAILY
Qty: 90 TABLET | Refills: 1 | Status: SHIPPED | OUTPATIENT
Start: 2024-08-14

## 2024-08-14 RX ORDER — AMLODIPINE BESYLATE 5 MG/1
5 TABLET ORAL DAILY
Qty: 90 TABLET | Refills: 1 | Status: SHIPPED | OUTPATIENT
Start: 2024-08-14

## 2024-08-14 RX ORDER — LISINOPRIL AND HYDROCHLOROTHIAZIDE 20; 25 MG/1; MG/1
1 TABLET ORAL DAILY
Qty: 90 TABLET | Refills: 1 | Status: SHIPPED | OUTPATIENT
Start: 2024-08-14

## 2024-08-14 RX ORDER — LEVOTHYROXINE SODIUM 112 UG/1
TABLET ORAL
Qty: 90 TABLET | Refills: 1 | Status: SHIPPED | OUTPATIENT
Start: 2024-08-14

## 2024-09-20 DIAGNOSIS — E11.9 TYPE 2 DIABETES MELLITUS WITHOUT COMPLICATION, WITHOUT LONG-TERM CURRENT USE OF INSULIN: ICD-10-CM

## 2024-09-20 RX ORDER — SEMAGLUTIDE 0.68 MG/ML
INJECTION, SOLUTION SUBCUTANEOUS
Qty: 9 ML | Refills: 0 | Status: CANCELLED | OUTPATIENT
Start: 2024-09-20 | End: 2024-12-13

## 2024-10-16 ENCOUNTER — OFFICE VISIT (OUTPATIENT)
Dept: FAMILY MEDICINE | Facility: CLINIC | Age: 64
End: 2024-10-16
Payer: COMMERCIAL

## 2024-10-16 VITALS
WEIGHT: 202.69 LBS | SYSTOLIC BLOOD PRESSURE: 110 MMHG | BODY MASS INDEX: 38.27 KG/M2 | DIASTOLIC BLOOD PRESSURE: 64 MMHG | HEIGHT: 61 IN | HEART RATE: 102 BPM | OXYGEN SATURATION: 99 % | TEMPERATURE: 98 F

## 2024-10-16 DIAGNOSIS — E11.69 HYPERLIPIDEMIA ASSOCIATED WITH TYPE 2 DIABETES MELLITUS: ICD-10-CM

## 2024-10-16 DIAGNOSIS — M85.852 OSTEOPENIA OF LEFT FEMORAL NECK: ICD-10-CM

## 2024-10-16 DIAGNOSIS — E66.01 CLASS 2 SEVERE OBESITY DUE TO EXCESS CALORIES WITH SERIOUS COMORBIDITY AND BODY MASS INDEX (BMI) OF 38.0 TO 38.9 IN ADULT: ICD-10-CM

## 2024-10-16 DIAGNOSIS — E03.9 ACQUIRED HYPOTHYROIDISM: ICD-10-CM

## 2024-10-16 DIAGNOSIS — Z23 FLU VACCINE NEED: ICD-10-CM

## 2024-10-16 DIAGNOSIS — K76.0 NAFLD (NONALCOHOLIC FATTY LIVER DISEASE): ICD-10-CM

## 2024-10-16 DIAGNOSIS — E66.812 CLASS 2 SEVERE OBESITY DUE TO EXCESS CALORIES WITH SERIOUS COMORBIDITY AND BODY MASS INDEX (BMI) OF 38.0 TO 38.9 IN ADULT: ICD-10-CM

## 2024-10-16 DIAGNOSIS — E11.9 TYPE 2 DIABETES MELLITUS WITHOUT COMPLICATION, WITHOUT LONG-TERM CURRENT USE OF INSULIN: Primary | ICD-10-CM

## 2024-10-16 DIAGNOSIS — G47.33 OSA ON CPAP: ICD-10-CM

## 2024-10-16 DIAGNOSIS — E78.5 HYPERLIPIDEMIA ASSOCIATED WITH TYPE 2 DIABETES MELLITUS: ICD-10-CM

## 2024-10-16 DIAGNOSIS — I15.2 HYPERTENSION ASSOCIATED WITH TYPE 2 DIABETES MELLITUS: ICD-10-CM

## 2024-10-16 DIAGNOSIS — E11.59 HYPERTENSION ASSOCIATED WITH TYPE 2 DIABETES MELLITUS: ICD-10-CM

## 2024-10-16 PROCEDURE — 99214 OFFICE O/P EST MOD 30 MIN: CPT | Mod: 25,S$GLB,, | Performed by: NURSE PRACTITIONER

## 2024-10-16 PROCEDURE — 99999 PR PBB SHADOW E&M-EST. PATIENT-LVL IV: CPT | Mod: PBBFAC,,, | Performed by: NURSE PRACTITIONER

## 2024-10-16 PROCEDURE — 3008F BODY MASS INDEX DOCD: CPT | Mod: CPTII,S$GLB,, | Performed by: NURSE PRACTITIONER

## 2024-10-16 PROCEDURE — 4010F ACE/ARB THERAPY RXD/TAKEN: CPT | Mod: CPTII,S$GLB,, | Performed by: NURSE PRACTITIONER

## 2024-10-16 PROCEDURE — 2023F DILAT RTA XM W/O RTNOPTHY: CPT | Mod: CPTII,S$GLB,, | Performed by: NURSE PRACTITIONER

## 2024-10-16 PROCEDURE — 3044F HG A1C LEVEL LT 7.0%: CPT | Mod: CPTII,S$GLB,, | Performed by: NURSE PRACTITIONER

## 2024-10-16 PROCEDURE — 3066F NEPHROPATHY DOC TX: CPT | Mod: CPTII,S$GLB,, | Performed by: NURSE PRACTITIONER

## 2024-10-16 PROCEDURE — 90471 IMMUNIZATION ADMIN: CPT | Mod: S$GLB,,, | Performed by: NURSE PRACTITIONER

## 2024-10-16 PROCEDURE — 1126F AMNT PAIN NOTED NONE PRSNT: CPT | Mod: CPTII,S$GLB,, | Performed by: NURSE PRACTITIONER

## 2024-10-16 PROCEDURE — 90653 IIV ADJUVANT VACCINE IM: CPT | Mod: S$GLB,,, | Performed by: NURSE PRACTITIONER

## 2024-10-16 PROCEDURE — 3061F NEG MICROALBUMINURIA REV: CPT | Mod: CPTII,S$GLB,, | Performed by: NURSE PRACTITIONER

## 2024-10-16 PROCEDURE — 1160F RVW MEDS BY RX/DR IN RCRD: CPT | Mod: CPTII,S$GLB,, | Performed by: NURSE PRACTITIONER

## 2024-10-16 PROCEDURE — 3078F DIAST BP <80 MM HG: CPT | Mod: CPTII,S$GLB,, | Performed by: NURSE PRACTITIONER

## 2024-10-16 PROCEDURE — 3074F SYST BP LT 130 MM HG: CPT | Mod: CPTII,S$GLB,, | Performed by: NURSE PRACTITIONER

## 2024-10-16 PROCEDURE — 1159F MED LIST DOCD IN RCRD: CPT | Mod: CPTII,S$GLB,, | Performed by: NURSE PRACTITIONER

## 2024-10-16 RX ORDER — SEMAGLUTIDE 0.68 MG/ML
0.5 INJECTION, SOLUTION SUBCUTANEOUS
Qty: 3 ML | Refills: 2 | Status: SHIPPED | OUTPATIENT
Start: 2024-10-16

## 2024-10-16 NOTE — PROGRESS NOTES
"Subjective:       Patient ID: Digna Bhatia is a 63 y.o. female.    Chief Complaint: Follow-up (3 months F/U) and Flu Vaccine        HPI WITH ASSESSMENT AND PLAN OF CARE:      Patient is a 63-year-old white female with Type 2 Diabetes definitively diagnosed in August 2019 but Prediabetic since October 2018, hypertension, hyperlipidemia, hypothyroidism, fatty liver disease noted on ultrasound May 2017 with elevated liver enzymes, Osteopenia and obstructive sleep apnea with CPAP use that is here today for 3 month follow-up with fasting lab results.          TYPE 2 DIABETES  Currently taking Ozempic 0.5 mg injection weekly (started July 2024 per patient request)  FBG is now 99 with HgbA1C 5.4% - much improved   Stay on same medication.  Recheck in 3 months.          Hyperlipidemia   controlled on Rosuvastatin 10 mg daily.   LDL 68  Goal <70 - met  Stay on same medication  Recheck in June 2025             Hypertension  Currently controlled on present medications, amlodipine 5 mg daily and lisinopril HCT 20/25 mg daily.  /64 (BP Location: Left arm, Patient Position: Sitting)   Pulse 102   Temp 97.9 °F (36.6 °C) (Temporal)   Ht 5' 1" (1.549 m)   Wt 92 kg (202 lb 11.4 oz)   LMP  (LMP Unknown)   SpO2 99%   BMI 38.30 kg/m²   Stable.  Recheck in 3 months.        Hypothyroidism   Currently taking Levothyroxine 112 mcg daily  Taking correctly and levels are much improved  Recheck in 3 months.            Obesity  Body mass index is 38.3 kg/m².  Working on lifestyle modifications.        Fatty Liver Disease/Gilbert's syndrome  Seen on abdominal ultrasound 8/16/2017  Evaluated by GI Dr. Oliveira in 2017  Chronic elevation bilirubin (Gilbert's Syndrome)  AST/ALT within range            Obstructive sleep apnea   Intermittent use of CPAP machine.       Osteopenia   DEXA scan 1/22/24:  Lumbar spine normal bone density.  Left femoral neck osteopenia.  The left femoral neck bone mineral density is equal to 0.794 g/cm " squared with a T score of -1.8.   Taking Calcium with Vitamin D supplement  Stable.  Recheck in January 2027         Labs:  JEAN siegel  TSH -controlled on present medication  A1C 5.4%      Lab Visit on 10/14/2024   Component Date Value Ref Range Status    Sodium 10/14/2024 141  136 - 145 mmol/L Final    Potassium 10/14/2024 3.5  3.5 - 5.1 mmol/L Final    Chloride 10/14/2024 104  95 - 110 mmol/L Final    CO2 10/14/2024 27  23 - 29 mmol/L Final    Glucose 10/14/2024 99  70 - 110 mg/dL Final    BUN 10/14/2024 14  8 - 23 mg/dL Final    Creatinine 10/14/2024 0.9  0.5 - 1.4 mg/dL Final    Calcium 10/14/2024 9.7  8.7 - 10.5 mg/dL Final    Total Protein 10/14/2024 6.2  6.0 - 8.4 g/dL Final    Albumin 10/14/2024 3.7  3.5 - 5.2 g/dL Final    Total Bilirubin 10/14/2024 1.4 (H)  0.1 - 1.0 mg/dL Final    Comment: For infants and newborns, interpretation of results should be based  on gestational age, weight and in agreement with clinical  observations.    Premature Infant recommended reference ranges:  Up to 24 hours.............<8.0 mg/dL  Up to 48 hours............<12.0 mg/dL  3-5 days..................<15.0 mg/dL  6-29 days.................<15.0 mg/dL      Alkaline Phosphatase 10/14/2024 58  55 - 135 U/L Final    AST 10/14/2024 15  10 - 40 U/L Final    ALT 10/14/2024 23  10 - 44 U/L Final    eGFR 10/14/2024 >60.0  >60 mL/min/1.73 m^2 Final    Anion Gap 10/14/2024 10  8 - 16 mmol/L Final    Hemoglobin A1C 10/14/2024 5.4  4.0 - 5.6 % Final    Comment: ADA Screening Guidelines:  5.7-6.4%  Consistent with prediabetes  >or=6.5%  Consistent with diabetes    High levels of fetal hemoglobin interfere with the HbA1C  assay. Heterozygous hemoglobin variants (HbS, HgC, etc)do  not significantly interfere with this assay.   However, presence of multiple variants may affect accuracy.      Estimated Avg Glucose 10/14/2024 108  68 - 131 mg/dL Final    TSH 10/14/2024 0.460  0.400 - 4.000 uIU/mL Final    Free T4 10/14/2024 1.22  0.71 - 1.51  "ng/dL Final    T3, Total 10/14/2024 88  60 - 180 ng/dL Final       Vitals:    10/16/24 1344   BP: 110/64   BP Location: Left arm   Patient Position: Sitting   Pulse: 102   Temp: 97.9 °F (36.6 °C)   TempSrc: Temporal   SpO2: 99%   Weight: 92 kg (202 lb 11.4 oz)   Height: 5' 1" (1.549 m)         Diagnoses this Encounter:         ICD-10-CM ICD-9-CM   1. Type 2 diabetes mellitus without complication, without long-term current use of insulin  E11.9 250.00   2. Hyperlipidemia associated with type 2 diabetes mellitus  E11.69 250.80    E78.5 272.4   3. Hypertension associated with type 2 diabetes mellitus  E11.59 250.80    I15.2 401.9   4. Class 2 severe obesity due to excess calories with serious comorbidity and body mass index (BMI) of 38.0 to 38.9 in adult  E66.812 278.01    E66.01 V85.38    Z68.38    5. NAFLD (nonalcoholic fatty liver disease)  K76.0 571.8   6. Acquired hypothyroidism  E03.9 244.9   7. KELLY on CPAP  G47.33 327.23   8. Osteopenia of left femoral neck  M85.852 733.90   9. Flu vaccine need  Z23 V04.81       Orders Placed This Encounter    LIPID PANEL    Comprehensive metabolic panel    HEMOGLOBIN A1C    TSH    T4, Free    T3    influenza (adjuvanted) (Fluad) 45 mcg/0.5 mL IM vaccine (> or = 66 yo) 0.5 mL        Follow up in about 3 months (around 1/16/2025) for fasting labs and 3 month follow up .     Patient's Medications   New Prescriptions    No medications on file   Previous Medications    AMLODIPINE (NORVASC) 5 MG TABLET    Take 1 tablet (5 mg total) by mouth once daily.    ASPIRIN (ECOTRIN) 81 MG EC TABLET    Take 81 mg by mouth once daily.    CALCIUM-VITAMIN D 600 MG-10 MCG (400 UNIT) TAB    Take 1 tablet by mouth 2 (two) times a day.    LEVOTHYROXINE (SYNTHROID) 112 MCG TABLET    TAKE 1 TABLET DAILY BEFORE BREAKFAST    LISINOPRIL-HYDROCHLOROTHIAZIDE (PRINZIDE,ZESTORETIC) 20-25 MG TAB    Take 1 tablet by mouth once daily.    ROSUVASTATIN (CRESTOR) 10 MG TABLET    Take 1 tablet (10 mg total) by mouth " once daily.   Modified Medications    Modified Medication Previous Medication    SEMAGLUTIDE (OZEMPIC) 0.25 MG OR 0.5 MG (2 MG/3 ML) PEN INJECTOR semaglutide (OZEMPIC) 0.25 mg or 0.5 mg (2 mg/3 mL) pen injector       Inject 0.5 mg into the skin every 7 days.    Inject 0.25 mg into the skin every 7 days for 28 days, THEN 0.5 mg every 7 days.   Discontinued Medications    METFORMIN (GLUCOPHAGE-XR) 500 MG ER 24HR TABLET    Take 2 tablets (1,000 mg total) by mouth daily with breakfast.         Review of Systems   HENT: Negative.     Respiratory: Negative.     Cardiovascular: Negative.    Gastrointestinal: Negative.          Objective:        Physical Exam  Constitutional:       General: She is not in acute distress.     Appearance: Normal appearance. She is obese. She is not toxic-appearing or diaphoretic.      Comments: Body mass index is 38.3 kg/m².     Cardiovascular:      Rate and Rhythm: Normal rate and regular rhythm.      Heart sounds: Normal heart sounds.   Pulmonary:      Effort: Pulmonary effort is normal. No respiratory distress.      Breath sounds: Normal breath sounds.   Abdominal:      General: There is no distension.   Neurological:      Mental Status: She is alert and oriented to person, place, and time.   Psychiatric:         Mood and Affect: Mood normal.         Behavior: Behavior normal.             Past Medical History:   Diagnosis Date    H/O mammogram 2016    has done at [facility]    Hyperlipidemia 2017    Hypertension     Hypothyroidism     IFG (impaired fasting glucose)     NAFLD (nonalcoholic fatty liver disease)     KELLY (obstructive sleep apnea)     on CPAP    KELLY on CPAP     Screening for colorectal cancer 2016    Type 2 diabetes mellitus without complication, without long-term current use of insulin 2019       Past Surgical History:   Procedure Laterality Date     SECTION      COLONOSCOPY N/A 2016    Procedure: COLONOSCOPY-Miralax split prep ;  Surgeon: Cali ZAMORANO  Roxanne Fernández MD;  Location: [facility] ENDO;  Service: Endoscopy;  Laterality: N/A;    COLONOSCOPY N/A 2022    Procedure: COLONOSCOPY;  Surgeon: Eli Kong MD;  Location: [facility] ENDO;  Service: Endoscopy;  Laterality: N/A;    DILATION AND CURETTAGE OF UTERUS         Family History   Problem Relation Name Age of Onset    Dementia Mother      Parkinsonism Mother          PASSED AGE 78    Hypertension Father      Cancer Father          KIDNEY PASSED AGE 70    Heart disease Father  50        HEART ATTACK    Diabetes Father      Hypertension Sister      Diabetes Brother      Hypertension Brother      Diabetes Sister      Hypertension Sister      Hypertension Sister      Hypertension Brother      No Known Problems Daughter      No Known Problems Son      No Known Problems Son      Breast cancer Neg Hx      Colon cancer Neg Hx      Ovarian cancer Neg Hx         Social History     Socioeconomic History    Marital status:    Tobacco Use    Smoking status: Former     Current packs/day: 0.00     Average packs/day: 0.1 packs/day for 8.0 years (0.8 ttl pk-yrs)     Types: Cigarettes     Start date:      Quit date:      Years since quittin.8     Passive exposure: Never    Smokeless tobacco: Never    Tobacco comments:     socially only for around 20 years but not every day   Substance and Sexual Activity    Alcohol use: Yes     Comment: rare    Drug use: No    Sexual activity: Not Currently     Birth control/protection: Post-menopausal

## 2024-10-16 NOTE — PROCEDURES
"See imported Sleep Study result in "Chart Review" under the "Media tab".      (This Sleep Study was interpreted by a Board Certified Sleep Specialist who conducted an epoch-by-epoch review of the entire raw data recording.)     (The indication for this sleep study was reviewed and deemed appropriate by [facility] Practice Parameters or other reasons by a Board Certified Sleep Specialist.)    " Called pharmacy. They are out of stock on the vyvanse.    Gabriel OLIVA RN  [facility]

## 2024-11-20 DIAGNOSIS — E11.69 HYPERLIPIDEMIA ASSOCIATED WITH TYPE 2 DIABETES MELLITUS: ICD-10-CM

## 2024-11-20 DIAGNOSIS — E78.5 HYPERLIPIDEMIA ASSOCIATED WITH TYPE 2 DIABETES MELLITUS: ICD-10-CM

## 2024-11-20 DIAGNOSIS — E03.9 ACQUIRED HYPOTHYROIDISM: ICD-10-CM

## 2024-11-20 DIAGNOSIS — I15.2 HYPERTENSION ASSOCIATED WITH TYPE 2 DIABETES MELLITUS: ICD-10-CM

## 2024-11-20 DIAGNOSIS — E11.59 HYPERTENSION ASSOCIATED WITH TYPE 2 DIABETES MELLITUS: ICD-10-CM

## 2024-11-21 RX ORDER — LISINOPRIL AND HYDROCHLOROTHIAZIDE 20; 25 MG/1; MG/1
1 TABLET ORAL DAILY
Qty: 90 TABLET | Refills: 0 | Status: SHIPPED | OUTPATIENT
Start: 2024-11-21

## 2024-11-21 RX ORDER — LEVOTHYROXINE SODIUM 112 UG/1
TABLET ORAL
Qty: 90 TABLET | Refills: 0 | Status: SHIPPED | OUTPATIENT
Start: 2024-11-21

## 2024-11-21 RX ORDER — ROSUVASTATIN CALCIUM 10 MG/1
10 TABLET, COATED ORAL DAILY
Qty: 90 TABLET | Refills: 0 | Status: SHIPPED | OUTPATIENT
Start: 2024-11-21

## 2024-11-21 RX ORDER — AMLODIPINE BESYLATE 5 MG/1
5 TABLET ORAL DAILY
Qty: 90 TABLET | Refills: 0 | Status: SHIPPED | OUTPATIENT
Start: 2024-11-21

## 2024-11-25 ENCOUNTER — OFFICE VISIT (OUTPATIENT)
Facility: CLINIC | Age: 64
End: 2024-11-25
Payer: COMMERCIAL

## 2024-11-25 VITALS
BODY MASS INDEX: 37.36 KG/M2 | DIASTOLIC BLOOD PRESSURE: 62 MMHG | WEIGHT: 197.88 LBS | SYSTOLIC BLOOD PRESSURE: 108 MMHG | HEIGHT: 61 IN

## 2024-11-25 DIAGNOSIS — N89.8 VAGINAL DRYNESS: ICD-10-CM

## 2024-11-25 DIAGNOSIS — Z01.419 WELL WOMAN EXAM WITH ROUTINE GYNECOLOGICAL EXAM: Primary | ICD-10-CM

## 2024-11-25 PROCEDURE — 3078F DIAST BP <80 MM HG: CPT | Mod: CPTII,S$GLB,, | Performed by: STUDENT IN AN ORGANIZED HEALTH CARE EDUCATION/TRAINING PROGRAM

## 2024-11-25 PROCEDURE — 3288F FALL RISK ASSESSMENT DOCD: CPT | Mod: CPTII,S$GLB,, | Performed by: STUDENT IN AN ORGANIZED HEALTH CARE EDUCATION/TRAINING PROGRAM

## 2024-11-25 PROCEDURE — 99397 PER PM REEVAL EST PAT 65+ YR: CPT | Mod: S$GLB,,, | Performed by: STUDENT IN AN ORGANIZED HEALTH CARE EDUCATION/TRAINING PROGRAM

## 2024-11-25 PROCEDURE — 3066F NEPHROPATHY DOC TX: CPT | Mod: CPTII,S$GLB,, | Performed by: STUDENT IN AN ORGANIZED HEALTH CARE EDUCATION/TRAINING PROGRAM

## 2024-11-25 PROCEDURE — 1126F AMNT PAIN NOTED NONE PRSNT: CPT | Mod: CPTII,S$GLB,, | Performed by: STUDENT IN AN ORGANIZED HEALTH CARE EDUCATION/TRAINING PROGRAM

## 2024-11-25 PROCEDURE — 1101F PT FALLS ASSESS-DOCD LE1/YR: CPT | Mod: CPTII,S$GLB,, | Performed by: STUDENT IN AN ORGANIZED HEALTH CARE EDUCATION/TRAINING PROGRAM

## 2024-11-25 PROCEDURE — 3061F NEG MICROALBUMINURIA REV: CPT | Mod: CPTII,S$GLB,, | Performed by: STUDENT IN AN ORGANIZED HEALTH CARE EDUCATION/TRAINING PROGRAM

## 2024-11-25 PROCEDURE — 3044F HG A1C LEVEL LT 7.0%: CPT | Mod: CPTII,S$GLB,, | Performed by: STUDENT IN AN ORGANIZED HEALTH CARE EDUCATION/TRAINING PROGRAM

## 2024-11-25 PROCEDURE — 99999 PR PBB SHADOW E&M-EST. PATIENT-LVL III: CPT | Mod: PBBFAC,,, | Performed by: STUDENT IN AN ORGANIZED HEALTH CARE EDUCATION/TRAINING PROGRAM

## 2024-11-25 PROCEDURE — 1159F MED LIST DOCD IN RCRD: CPT | Mod: CPTII,S$GLB,, | Performed by: STUDENT IN AN ORGANIZED HEALTH CARE EDUCATION/TRAINING PROGRAM

## 2024-11-25 PROCEDURE — 4010F ACE/ARB THERAPY RXD/TAKEN: CPT | Mod: CPTII,S$GLB,, | Performed by: STUDENT IN AN ORGANIZED HEALTH CARE EDUCATION/TRAINING PROGRAM

## 2024-11-25 PROCEDURE — 3008F BODY MASS INDEX DOCD: CPT | Mod: CPTII,S$GLB,, | Performed by: STUDENT IN AN ORGANIZED HEALTH CARE EDUCATION/TRAINING PROGRAM

## 2024-11-25 PROCEDURE — 87624 HPV HI-RISK TYP POOLED RSLT: CPT | Performed by: STUDENT IN AN ORGANIZED HEALTH CARE EDUCATION/TRAINING PROGRAM

## 2024-11-25 PROCEDURE — 3074F SYST BP LT 130 MM HG: CPT | Mod: CPTII,S$GLB,, | Performed by: STUDENT IN AN ORGANIZED HEALTH CARE EDUCATION/TRAINING PROGRAM

## 2024-11-25 RX ORDER — ESTRADIOL 0.1 MG/G
1 CREAM VAGINAL
Qty: 42.5 G | Refills: 3 | Status: SHIPPED | OUTPATIENT
Start: 2024-11-25

## 2024-11-25 NOTE — PROGRESS NOTES
CC: Well woman exam    HPI:  Digna Bhatia is a 63 y.o. female  presents for a well woman exam.  She has no issues, problems, or complaints.       Patient history:   Past Medical History:   Diagnosis Date    H/O mammogram 2016    has done at [facility]    Hyperlipidemia 2017    Hypertension     Hypothyroidism     IFG (impaired fasting glucose)     NAFLD (nonalcoholic fatty liver disease)     KELLY (obstructive sleep apnea)     on CPAP    KELLY on CPAP     Screening for colorectal cancer 2016    Type 2 diabetes mellitus without complication, without long-term current use of insulin 2019     Past Surgical History:   Procedure Laterality Date     SECTION      COLONOSCOPY N/A 2016    Procedure: COLONOSCOPY-Miralax split prep ;  Surgeon: Cali Oliveira Jr., MD;  Location: [facility] ENDO;  Service: Endoscopy;  Laterality: N/A;    COLONOSCOPY N/A 2022    Procedure: COLONOSCOPY;  Surgeon: Eli Kong MD;  Location: [facility] ENDO;  Service: Endoscopy;  Laterality: N/A;    DILATION AND CURETTAGE OF UTERUS       OB History    Para Term  AB Living   6 3 1 2 3 3   SAB IAB Ectopic Multiple Live Births   3       3      # Outcome Date GA Lbr Gerard/2nd Weight Sex Type Anes PTL Lv   6 SAB            5 SAB            4 SAB            3 Term      CS-Unspec   LEANDER   2       CS-Unspec  Y LEANDER   1       CS-Unspec  Y LEANDER       GYN  Menopausal: Yes  History of abnormal paps: DENIES  Abnormal or postmenopausal bleeding: DENIES  History of abnormal mammograms:DENIES   Family history of breast or ovarian cancer: DENIES  Any breast masses, pain, skin changes, or nipple discharge: DENIES  Possible recent STD exposure: denies  Contraception: N/A    Pap: Done today  Mammogram: BIRADS1, T-C=3.59% 2024      Family History   Problem Relation Name Age of Onset    Dementia Mother      Parkinsonism Mother          PASSED AGE 78    Hypertension Father      Cancer Father          KIDNEY  PASSED AGE 70    Heart disease Father  50        HEART ATTACK    Diabetes Father      Hypertension Sister      Diabetes Brother      Hypertension Brother      Diabetes Sister      Hypertension Sister      Hypertension Sister      Hypertension Brother      No Known Problems Daughter      No Known Problems Son      No Known Problems Son      Breast cancer Neg Hx      Colon cancer Neg Hx      Ovarian cancer Neg Hx       Social History     Tobacco Use    Smoking status: Former     Current packs/day: 0.00     Average packs/day: 0.1 packs/day for 8.0 years (0.8 ttl pk-yrs)     Types: Cigarettes     Start date:      Quit date:      Years since quittin.9     Passive exposure: Never    Smokeless tobacco: Never    Tobacco comments:     socially only for around 20 years but not every day   Substance Use Topics    Alcohol use: Yes     Comment: rare    Drug use: No     Allergies: Bactrim [sulfamethoxazole-trimethoprim] and Pcn [penicillins]    Current Outpatient Medications:     amLODIPine (NORVASC) 5 MG tablet, Take 1 tablet (5 mg total) by mouth once daily., Disp: 90 tablet, Rfl: 0    aspirin (ECOTRIN) 81 MG EC tablet, Take 81 mg by mouth once daily., Disp: , Rfl:     calcium-vitamin D 600 mg-10 mcg (400 unit) Tab, Take 1 tablet by mouth 2 (two) times a day., Disp: , Rfl: 0    levothyroxine (SYNTHROID) 112 MCG tablet, TAKE 1 TABLET DAILY BEFORE BREAKFAST, Disp: 90 tablet, Rfl: 0    lisinopriL-hydrochlorothiazide (PRINZIDE,ZESTORETIC) 20-25 mg Tab, Take 1 tablet by mouth once daily., Disp: 90 tablet, Rfl: 0    rosuvastatin (CRESTOR) 10 MG tablet, Take 1 tablet (10 mg total) by mouth once daily., Disp: 90 tablet, Rfl: 0    semaglutide (OZEMPIC) 0.25 mg or 0.5 mg (2 mg/3 mL) pen injector, Inject 0.5 mg into the skin every 7 days., Disp: 3 mL, Rfl: 2    estradioL (ESTRACE) 0.01 % (0.1 mg/gram) vaginal cream, Place 1 g vaginally twice a week., Disp: 42.5 g, Rfl: 3       ROS:  GENERAL: Denies weight gain or weight loss.  "Feeling well overall.   SKIN: Denies rash or lesions.   HEAD: Denies head injury or headache.   NODES: Denies enlarged lymph nodes.   CHEST: Denies chest pain or shortness of breath.   CARDIOVASCULAR: Denies palpitations or left sided chest pain.   ABDOMEN: No abdominal pain, constipation, diarrhea, nausea, vomiting or rectal bleeding.   URINARY: No frequency, dysuria, hematuria, or burning on urination.  REPRODUCTIVE: See HPI.   BREASTS: The patient performs breast self-examination and denies pain, lumps, or nipple discharge.   HEMATOLOGIC: No easy bruisability or excessive bleeding.  MUSCULOSKELETAL: Denies joint pain or swelling.   NEUROLOGIC: Denies syncope or weakness.   PSYCHIATRIC: Denies depression, anxiety or mood swings.    Objective:   /62   Ht 5' 1" (1.549 m)   Wt 89.8 kg (197 lb 13.8 oz)   LMP  (LMP Unknown)   BMI 37.39 kg/m²       Physical Exam:  APPEARANCE: Well nourished, well developed, in no acute distress.  AFFECT: WNL, alert and oriented x 3  SKIN: No acne or hirsutism  NECK: Neck symmetric without masses or thyromegaly  CHEST: Good respiratory effect  ABDOMEN: Soft.  No tenderness or masses.  No hepatosplenomegaly.  No hernias.  BREASTS: Symmetrical, no skin changes or visible lesions.  No palpable masses, nipple discharge bilaterally.  PELVIC: Normal external genitalia without lesions.  Normal hair distribution.  Adequate perineal body, normal urethral meatus.  Vagina atrophic without lesions or discharge.  Cervix small, without lesions, discharge or tenderness.  No significant cystocele or rectocele.    EXTREMITIES: No edema.    ASSESSMENT AND PLAN  1. Well woman exam with routine gynecological exam        2. Vaginal dryness  estradioL (ESTRACE) 0.01 % (0.1 mg/gram) vaginal cream          Annual exam  Breast and pelvic exam: WNL  Patient counseled on ASCCP guidelines for cervical cytology screening  Cervical screening: done today   Patient counseled on current recommendations for " breast cancer screening  Mammogram screening: up to date  STD testing: not requested today   Osteoporosis screening 2024 osteopenia       She was counseled to follow up with her PCP for other routine health maintenance      Follow up in about 1 year (around 11/25/2025).      Stephanie Heaney, MD OBGYN [facility]

## 2025-01-06 ENCOUNTER — PATIENT MESSAGE (OUTPATIENT)
Dept: FAMILY MEDICINE | Facility: CLINIC | Age: 65
End: 2025-01-06
Payer: COMMERCIAL

## 2025-01-06 DIAGNOSIS — E11.9 TYPE 2 DIABETES MELLITUS WITHOUT COMPLICATION, WITHOUT LONG-TERM CURRENT USE OF INSULIN: ICD-10-CM

## 2025-01-06 DIAGNOSIS — Z12.31 ENCOUNTER FOR SCREENING MAMMOGRAM FOR MALIGNANT NEOPLASM OF BREAST: Primary | ICD-10-CM

## 2025-01-07 RX ORDER — SEMAGLUTIDE 0.68 MG/ML
0.5 INJECTION, SOLUTION SUBCUTANEOUS
Qty: 3 ML | Refills: 2 | OUTPATIENT
Start: 2025-01-07

## 2025-01-08 NOTE — TELEPHONE ENCOUNTER
I seen patient on 10/16/2024 - advised to follow up in 3 months.    She has appts next week.    FIND out if she will be out of medication before her visit with me - I hate to fill such an expensive medication IF we end up having to change the dose.  That is why we try to schedule labs/visit BEFORE being out of medication.    If she will be out - I will fill but let me know because I declined fill for now.

## 2025-01-09 RX ORDER — SEMAGLUTIDE 0.68 MG/ML
0.5 INJECTION, SOLUTION SUBCUTANEOUS
Qty: 3 ML | Refills: 2 | Status: SHIPPED | OUTPATIENT
Start: 2025-01-09

## 2025-01-09 NOTE — TELEPHONE ENCOUNTER
Patient glory she took her last shot on Sunday- what would you like for to do- she still have Metformin that you told her to hold.

## 2025-01-09 NOTE — TELEPHONE ENCOUNTER
Her levels were really good last time so I do not expect any problems so I filled her Ozempic - tell her to remind me at visit and I will make sure next appt falls BEFORE she will be out of medication.

## 2025-01-16 ENCOUNTER — OFFICE VISIT (OUTPATIENT)
Dept: FAMILY MEDICINE | Facility: CLINIC | Age: 65
End: 2025-01-16
Payer: COMMERCIAL

## 2025-01-16 VITALS
SYSTOLIC BLOOD PRESSURE: 100 MMHG | HEART RATE: 96 BPM | DIASTOLIC BLOOD PRESSURE: 60 MMHG | TEMPERATURE: 99 F | OXYGEN SATURATION: 98 % | WEIGHT: 198.44 LBS | BODY MASS INDEX: 37.47 KG/M2 | HEIGHT: 61 IN

## 2025-01-16 DIAGNOSIS — E11.69 HYPERLIPIDEMIA ASSOCIATED WITH TYPE 2 DIABETES MELLITUS: ICD-10-CM

## 2025-01-16 DIAGNOSIS — M85.852 OSTEOPENIA OF LEFT FEMORAL NECK: ICD-10-CM

## 2025-01-16 DIAGNOSIS — G47.33 OSA ON CPAP: ICD-10-CM

## 2025-01-16 DIAGNOSIS — E11.9 TYPE 2 DIABETES MELLITUS WITHOUT COMPLICATION, WITHOUT LONG-TERM CURRENT USE OF INSULIN: Primary | ICD-10-CM

## 2025-01-16 DIAGNOSIS — E66.812 CLASS 2 SEVERE OBESITY DUE TO EXCESS CALORIES WITH SERIOUS COMORBIDITY AND BODY MASS INDEX (BMI) OF 37.0 TO 37.9 IN ADULT: ICD-10-CM

## 2025-01-16 DIAGNOSIS — K76.0 METABOLIC DYSFUNCTION-ASSOCIATED STEATOTIC LIVER DISEASE (MASLD): ICD-10-CM

## 2025-01-16 DIAGNOSIS — E03.9 ACQUIRED HYPOTHYROIDISM: ICD-10-CM

## 2025-01-16 DIAGNOSIS — E66.01 CLASS 2 SEVERE OBESITY DUE TO EXCESS CALORIES WITH SERIOUS COMORBIDITY AND BODY MASS INDEX (BMI) OF 37.0 TO 37.9 IN ADULT: ICD-10-CM

## 2025-01-16 DIAGNOSIS — I15.2 HYPERTENSION ASSOCIATED WITH TYPE 2 DIABETES MELLITUS: ICD-10-CM

## 2025-01-16 DIAGNOSIS — E78.5 HYPERLIPIDEMIA ASSOCIATED WITH TYPE 2 DIABETES MELLITUS: ICD-10-CM

## 2025-01-16 DIAGNOSIS — E11.59 HYPERTENSION ASSOCIATED WITH TYPE 2 DIABETES MELLITUS: ICD-10-CM

## 2025-01-16 PROCEDURE — 3008F BODY MASS INDEX DOCD: CPT | Mod: CPTII,S$GLB,, | Performed by: NURSE PRACTITIONER

## 2025-01-16 PROCEDURE — 1126F AMNT PAIN NOTED NONE PRSNT: CPT | Mod: CPTII,S$GLB,, | Performed by: NURSE PRACTITIONER

## 2025-01-16 PROCEDURE — 1159F MED LIST DOCD IN RCRD: CPT | Mod: CPTII,S$GLB,, | Performed by: NURSE PRACTITIONER

## 2025-01-16 PROCEDURE — 3074F SYST BP LT 130 MM HG: CPT | Mod: CPTII,S$GLB,, | Performed by: NURSE PRACTITIONER

## 2025-01-16 PROCEDURE — 3078F DIAST BP <80 MM HG: CPT | Mod: CPTII,S$GLB,, | Performed by: NURSE PRACTITIONER

## 2025-01-16 PROCEDURE — 3288F FALL RISK ASSESSMENT DOCD: CPT | Mod: CPTII,S$GLB,, | Performed by: NURSE PRACTITIONER

## 2025-01-16 PROCEDURE — 99214 OFFICE O/P EST MOD 30 MIN: CPT | Mod: S$GLB,,, | Performed by: NURSE PRACTITIONER

## 2025-01-16 PROCEDURE — 3044F HG A1C LEVEL LT 7.0%: CPT | Mod: CPTII,S$GLB,, | Performed by: NURSE PRACTITIONER

## 2025-01-16 PROCEDURE — 1101F PT FALLS ASSESS-DOCD LE1/YR: CPT | Mod: CPTII,S$GLB,, | Performed by: NURSE PRACTITIONER

## 2025-01-16 PROCEDURE — G2211 COMPLEX E/M VISIT ADD ON: HCPCS | Mod: S$GLB,,, | Performed by: NURSE PRACTITIONER

## 2025-01-16 PROCEDURE — 99999 PR PBB SHADOW E&M-EST. PATIENT-LVL III: CPT | Mod: PBBFAC,,, | Performed by: NURSE PRACTITIONER

## 2025-01-16 PROCEDURE — 1160F RVW MEDS BY RX/DR IN RCRD: CPT | Mod: CPTII,S$GLB,, | Performed by: NURSE PRACTITIONER

## 2025-01-16 RX ORDER — HYDROCHLOROTHIAZIDE 12.5 MG/1
12.5 TABLET ORAL DAILY
Qty: 90 TABLET | Refills: 0 | Status: SHIPPED | OUTPATIENT
Start: 2025-01-16 | End: 2026-01-16

## 2025-01-16 RX ORDER — SEMAGLUTIDE 1.34 MG/ML
1 INJECTION, SOLUTION SUBCUTANEOUS
Qty: 3 ML | Refills: 2 | Status: SHIPPED | OUTPATIENT
Start: 2025-01-16 | End: 2026-01-16

## 2025-01-16 RX ORDER — LEVOTHYROXINE SODIUM 100 UG/1
100 TABLET ORAL
Qty: 90 TABLET | Refills: 0 | Status: SHIPPED | OUTPATIENT
Start: 2025-01-16 | End: 2026-01-16

## 2025-01-16 RX ORDER — AMLODIPINE BESYLATE 5 MG/1
5 TABLET ORAL DAILY
Qty: 90 TABLET | Refills: 1 | Status: SHIPPED | OUTPATIENT
Start: 2025-01-16

## 2025-01-16 RX ORDER — ROSUVASTATIN CALCIUM 10 MG/1
10 TABLET, COATED ORAL DAILY
Qty: 90 TABLET | Refills: 1 | Status: SHIPPED | OUTPATIENT
Start: 2025-01-16

## 2025-01-16 RX ORDER — LISINOPRIL 20 MG/1
20 TABLET ORAL DAILY
Qty: 90 TABLET | Refills: 0 | Status: SHIPPED | OUTPATIENT
Start: 2025-01-16 | End: 2026-01-16

## 2025-01-16 NOTE — PROGRESS NOTES
"Subjective:       Patient ID: Digna Bhatia is a 64 y.o. female.    Chief Complaint: Follow-up (3 months F/U)        HPI WITH ASSESSMENT AND PLAN OF CARE:      Patient is a 64-year-old white female with Type 2 Diabetes definitively diagnosed in August 2019 but Prediabetic since October 2018, hypertension, hyperlipidemia, hypothyroidism, fatty liver disease noted on ultrasound May 2017 with elevated liver enzymes, Osteopenia and obstructive sleep apnea with CPAP use that is here today for 3 month follow-up with fasting lab results.          TYPE 2 DIABETES  Currently taking Ozempic 0.5 mg injection weekly (started July 2024 per patient request)  FBG is now 109 with HgbA1C 5.4% - much improved   Loss of 10 pounds since start of medication; still with increased CVD risks  Increase to Ozempic 1 mg injection weekly = 1/16/2025  Recheck in 3 months.             Hyperlipidemia   controlled on Rosuvastatin 10 mg daily.   LDL 75  Goal <70  Stay on same medication  Recheck in June 2025             Hypertension  Currently controlled on present medications, amlodipine 5 mg daily and lisinopril HCT 20/25 mg daily.  BP low  DECREASE the HCTZ - will CHANGE to Lisinopril 20 mg daily and HCTZ 12.5 mg daily and continue Amlodipine 5 mg daily  /60   Pulse 96   Temp 99 °F (37.2 °C) (Temporal)   Ht 5' 1" (1.549 m)   Wt 90 kg (198 lb 6.6 oz)   LMP  (LMP Unknown)   SpO2 98%   BMI 37.49 kg/m²   Recheck in 3 months.      Hypothyroidism   Currently taking Levothyroxine 112 mcg daily  TSH LOW  DECREASE dose to 100 mcg daily  Recheck in 3 months.             Obesity  Body mass index is 37.49 kg/m².  Working on lifestyle modifications.          Fatty Liver Disease/Gilbert's syndrome  Seen on abdominal ultrasound 8/16/2017  Evaluated by GI Dr. Oliveira in 2017  Chronic elevation bilirubin (Gilbert's Syndrome)  AST/ALT within range             Obstructive sleep apnea   Intermittent use of CPAP machine.        Osteopenia   DEXA " "scan 1/22/24:  Lumbar spine normal bone density.  Left femoral neck osteopenia.  The left femoral neck bone mineral density is equal to 0.794 g/cm squared with a T score of -1.8.   Taking Calcium with Vitamin D supplement  Stable.  Recheck in January 2027        Vitals:    01/16/25 1333   BP: 100/60   Pulse: 96   Temp: 99 °F (37.2 °C)   TempSrc: Temporal   SpO2: 98%   Weight: 90 kg (198 lb 6.6 oz)   Height: 5' 1" (1.549 m)         Diagnoses this Encounter:         ICD-10-CM ICD-9-CM   1. Type 2 diabetes mellitus without complication, without long-term current use of insulin  E11.9 250.00   2. Hypertension associated with type 2 diabetes mellitus  E11.59 250.80    I15.2 401.9   3. Hyperlipidemia associated with type 2 diabetes mellitus  E11.69 250.80    E78.5 272.4   4. Class 2 severe obesity due to excess calories with serious comorbidity and body mass index (BMI) of 37.0 to 37.9 in adult  E66.812 278.01    E66.01 V85.37    Z68.37    5. Acquired hypothyroidism  E03.9 244.9   6. KELLY on CPAP  G47.33 327.23   7. Metabolic dysfunction-associated steatotic liver disease (MASLD)  K76.0 571.8   8. Osteopenia of left femoral neck  M85.852 733.90       Orders Placed This Encounter    Comprehensive Metabolic Panel    Hemoglobin A1C    TSH    T4, Free    T3    semaglutide (OZEMPIC) 1 mg/dose (4 mg/3 mL)    lisinopriL (PRINIVIL,ZESTRIL) 20 MG tablet    hydroCHLOROthiazide (HYDRODIURIL) 12.5 MG Tab    levothyroxine (SYNTHROID) 100 MCG tablet        Follow up in about 12 weeks (around 4/10/2025) for fasting labs and follow up.     Patient's Medications   New Prescriptions    HYDROCHLOROTHIAZIDE (HYDRODIURIL) 12.5 MG TAB    Take 1 tablet (12.5 mg total) by mouth once daily.    LEVOTHYROXINE (SYNTHROID) 100 MCG TABLET    Take 1 tablet (100 mcg total) by mouth before breakfast.    LISINOPRIL (PRINIVIL,ZESTRIL) 20 MG TABLET    Take 1 tablet (20 mg total) by mouth once daily.    SEMAGLUTIDE (OZEMPIC) 1 MG/DOSE (4 MG/3 ML)    Inject 1 " mg into the skin every 7 days.   Previous Medications    AMLODIPINE (NORVASC) 5 MG TABLET    Take 1 tablet (5 mg total) by mouth once daily.    ASPIRIN (ECOTRIN) 81 MG EC TABLET    Take 81 mg by mouth once daily.    CALCIUM-VITAMIN D 600 MG-10 MCG (400 UNIT) TAB    Take 1 tablet by mouth 2 (two) times a day.    ESTRADIOL (ESTRACE) 0.01 % (0.1 MG/GRAM) VAGINAL CREAM    Place 1 g vaginally twice a week.    LISINOPRIL-HYDROCHLOROTHIAZIDE (PRINZIDE,ZESTORETIC) 20-25 MG TAB    Take 1 tablet by mouth once daily.    ROSUVASTATIN (CRESTOR) 10 MG TABLET    Take 1 tablet (10 mg total) by mouth once daily.   Modified Medications    No medications on file   Discontinued Medications    LEVOTHYROXINE (SYNTHROID) 112 MCG TABLET    TAKE 1 TABLET DAILY BEFORE BREAKFAST    SEMAGLUTIDE (OZEMPIC) 0.25 MG OR 0.5 MG (2 MG/3 ML) PEN INJECTOR    Inject 0.5 mg into the skin every 7 days.         Review of Systems   HENT: Negative.     Respiratory: Negative.     Cardiovascular: Negative.    Gastrointestinal: Negative.          Objective:        Physical Exam  Constitutional:       General: She is not in acute distress.     Appearance: Normal appearance. She is obese. She is not toxic-appearing or diaphoretic.      Comments: Body mass index is 37.49 kg/m².       Cardiovascular:      Rate and Rhythm: Normal rate and regular rhythm.      Heart sounds: Normal heart sounds.   Pulmonary:      Effort: Pulmonary effort is normal. No respiratory distress.      Breath sounds: Normal breath sounds.   Abdominal:      General: There is no distension.   Neurological:      Mental Status: She is alert and oriented to person, place, and time.   Psychiatric:         Mood and Affect: Mood normal.         Behavior: Behavior normal.             Past Medical History:   Diagnosis Date    H/O mammogram 03/31/2016    has done at [facility]    Hyperlipidemia 09/12/2017    Hypertension     Hypothyroidism     IFG (impaired fasting glucose)     NAFLD (nonalcoholic fatty liver  disease)     KELLY (obstructive sleep apnea)     on CPAP    KELLY on CPAP     Screening for colorectal cancer 2016    Type 2 diabetes mellitus without complication, without long-term current use of insulin 2019       Past Surgical History:   Procedure Laterality Date     SECTION      COLONOSCOPY N/A 2016    Procedure: COLONOSCOPY-Miralax split prep ;  Surgeon: Cali Oliveira Jr., MD;  Location: [facility] ENDO;  Service: Endoscopy;  Laterality: N/A;    COLONOSCOPY N/A 2022    Procedure: COLONOSCOPY;  Surgeon: Eli Kong MD;  Location: [facility] ENDO;  Service: Endoscopy;  Laterality: N/A;    DILATION AND CURETTAGE OF UTERUS         Family History   Problem Relation Name Age of Onset    Dementia Mother      Parkinsonism Mother          PASSED AGE 78    Hypertension Father      Cancer Father          KIDNEY PASSED AGE 70    Heart disease Father  50        HEART ATTACK    Diabetes Father      Hypertension Sister      Diabetes Brother      Hypertension Brother      Diabetes Sister      Hypertension Sister      Hypertension Sister      Hypertension Brother      No Known Problems Daughter      No Known Problems Son      No Known Problems Son      Breast cancer Neg Hx      Colon cancer Neg Hx      Ovarian cancer Neg Hx         Social History     Socioeconomic History    Marital status:    Tobacco Use    Smoking status: Former     Current packs/day: 0.00     Average packs/day: 0.1 packs/day for 8.0 years (0.8 ttl pk-yrs)     Types: Cigarettes     Start date:      Quit date:      Years since quittin.0     Passive exposure: Never    Smokeless tobacco: Never    Tobacco comments:     socially only for around 20 years but not every day   Substance and Sexual Activity    Alcohol use: Yes     Comment: rare    Drug use: No    Sexual activity: Not Currently     Birth control/protection: Post-menopausal

## 2025-02-21 DIAGNOSIS — E11.9 TYPE 2 DIABETES MELLITUS WITHOUT COMPLICATION, WITHOUT LONG-TERM CURRENT USE OF INSULIN: ICD-10-CM

## 2025-02-21 DIAGNOSIS — K76.0 METABOLIC DYSFUNCTION-ASSOCIATED STEATOTIC LIVER DISEASE (MASLD): ICD-10-CM

## 2025-02-22 RX ORDER — SEMAGLUTIDE 1.34 MG/ML
1 INJECTION, SOLUTION SUBCUTANEOUS
Qty: 9 ML | Refills: 0 | Status: SHIPPED | OUTPATIENT
Start: 2025-02-22

## 2025-03-20 DIAGNOSIS — E11.59 HYPERTENSION ASSOCIATED WITH TYPE 2 DIABETES MELLITUS: ICD-10-CM

## 2025-03-20 DIAGNOSIS — E03.9 ACQUIRED HYPOTHYROIDISM: ICD-10-CM

## 2025-03-20 DIAGNOSIS — I15.2 HYPERTENSION ASSOCIATED WITH TYPE 2 DIABETES MELLITUS: ICD-10-CM

## 2025-03-24 RX ORDER — LISINOPRIL 20 MG/1
20 TABLET ORAL DAILY
Qty: 90 TABLET | Refills: 0 | Status: SHIPPED | OUTPATIENT
Start: 2025-03-24

## 2025-03-24 RX ORDER — HYDROCHLOROTHIAZIDE 12.5 MG/1
12.5 TABLET ORAL DAILY
Qty: 90 TABLET | Refills: 0 | Status: SHIPPED | OUTPATIENT
Start: 2025-03-24

## 2025-03-24 RX ORDER — LEVOTHYROXINE SODIUM 100 UG/1
100 TABLET ORAL
Qty: 90 TABLET | Refills: 0 | Status: SHIPPED | OUTPATIENT
Start: 2025-03-24

## 2025-04-04 ENCOUNTER — TELEPHONE (OUTPATIENT)
Dept: FAMILY MEDICINE | Facility: CLINIC | Age: 65
End: 2025-04-04
Payer: COMMERCIAL

## 2025-04-04 DIAGNOSIS — E11.59 HYPERTENSION ASSOCIATED WITH TYPE 2 DIABETES MELLITUS: ICD-10-CM

## 2025-04-04 DIAGNOSIS — I15.2 HYPERTENSION ASSOCIATED WITH TYPE 2 DIABETES MELLITUS: ICD-10-CM

## 2025-04-04 DIAGNOSIS — E03.9 ACQUIRED HYPOTHYROIDISM: ICD-10-CM

## 2025-04-04 RX ORDER — HYDROCHLOROTHIAZIDE 12.5 MG/1
12.5 TABLET ORAL DAILY
Qty: 30 TABLET | Refills: 0 | Status: SHIPPED | OUTPATIENT
Start: 2025-04-04

## 2025-04-04 RX ORDER — LEVOTHYROXINE SODIUM 100 UG/1
100 TABLET ORAL
Qty: 30 TABLET | Refills: 0 | Status: SHIPPED | OUTPATIENT
Start: 2025-04-04

## 2025-04-04 RX ORDER — LISINOPRIL 20 MG/1
20 TABLET ORAL DAILY
Qty: 30 TABLET | Refills: 0 | Status: SHIPPED | OUTPATIENT
Start: 2025-04-04

## 2025-04-04 NOTE — TELEPHONE ENCOUNTER
Pt informed of 30 day supply of Synthroid, LisinopriL, and HydroCHLOROthiazide were all sent to [facility] in [place]. Pt expressed understanding.

## 2025-04-04 NOTE — TELEPHONE ENCOUNTER
Called and spoke with pt in regards of her message. Pt called to get  a few pills sent to a local pharmacy , due to her mail order being delayed and her medication not being mailed to her on time. Pt informed me that she only needs a few bills of each called to her local pharmacy , until her mail order comes in.      Patient wants a few meds sent to [facility] in [place]. I did inform pt that she will have tp pay out of pocket since other meds in ran through her insurance. Pt verbalized understanding.

## 2025-04-04 NOTE — TELEPHONE ENCOUNTER
----- Message from Tahira sent at 4/4/2025  3:58 PM CDT -----  Type:  Needs Medical AdviceWho Called: Viktoria name and phone #:  [facility] Would the patient rather a call back or a response via [facility]? callBest Call Back Number:  [phone] Information: needing a temporary script for medication due to being delayed to weather in some place for mail order for hydroCHLOROthiazide 12.5 MG Tab, lisinopriL (PRINIVIL,ZESTRIL) 20 MG tablet, and levothyroxine (SYNTHROID) 100 MCG tablet

## 2025-04-09 NOTE — PROGRESS NOTES
"Subjective:       Patient ID: Digna Bhatia is a 64 y.o. female.    Chief Complaint: Follow-up (3 months F/U)        HPI WITH ASSESSMENT AND PLAN OF CARE:      Patient is a 64-year-old white female with Type 2 Diabetes definitively diagnosed in August 2019 but Prediabetic since October 2018, hypertension, hyperlipidemia, hypothyroidism, fatty liver disease noted on ultrasound May 2017 with elevated liver enzymes, Osteopenia and obstructive sleep apnea with CPAP use that is here today for 3 month follow-up with fasting lab results.          TYPE 2 DIABETES  Was on Metformin in past but changed to Ozempic for CVD, kidney and fatty liver benefits.  Started Ozempic 0.5 mg injection weekly (started July 2024 per patient request)  Increase to Ozempic 1 mg injection weekly = 1/16/2025  6 pounds weight loss only but diabetes is much improved - patient asking or change Mounjaro  STOP Ozempic 1 mg weekly and CHANGE to Mounjaro 7.5 mg weekly  Recheck in 11 weeks.             Hyperlipidemia   controlled on Rosuvastatin 10 mg daily.   LDL 75  Goal <70  Stay on same medication  Recheck in June 2025             Hypertension  Currently controlled on present medications, amlodipine 5 mg daily, Lisinopril 20 daily, and HCTZ 12.5 mg daily   /76 (BP Location: Left arm, Patient Position: Sitting)   Pulse 90   Temp 97.9 °F (36.6 °C) (Temporal)   Ht 5' 1" (1.549 m)   Wt 91.7 kg (202 lb 0.8 oz)   LMP  (LMP Unknown)   SpO2 98%   BMI 38.18 kg/m²   Recheck in 3 months.        Hypothyroidism   Currently taking Levothyroxine 100 mcg daily  Levels controlled  Recheck in 3 months for wellness exam.             Obesity  Body mass index is 38.18 kg/m².  Working on lifestyle modifications.           Fatty Liver Disease/Gilbert's syndrome  Seen on abdominal ultrasound 8/16/2017  Evaluated by GI Dr. Oliveira in 2017  Chronic elevation bilirubin (Gilbert's Syndrome)  AST/ALT within range             Obstructive sleep apnea "   Intermittent use of CPAP machine.        Osteopenia   DEXA scan 1/22/24:  Lumbar spine normal bone density.  Left femoral neck osteopenia.  The left femoral neck bone mineral density is T score of -1.8.   Taking Calcium with Vitamin D supplement  Stable.  Recheck in January 2027      Lab Visit on 04/07/2025   Component Date Value Ref Range Status    Sodium 04/07/2025 137  136 - 145 mmol/L Final    Potassium 04/07/2025 4.0  3.5 - 5.1 mmol/L Final    Chloride 04/07/2025 104  95 - 110 mmol/L Final    CO2 04/07/2025 25  23 - 29 mmol/L Final    Glucose 04/07/2025 107  70 - 110 mg/dL Final    BUN 04/07/2025 16  8 - 23 mg/dL Final    Creatinine 04/07/2025 0.8  0.5 - 1.4 mg/dL Final    Calcium 04/07/2025 9.7  8.7 - 10.5 mg/dL Final    Protein Total 04/07/2025 7.2  6.0 - 8.4 gm/dL Final    Albumin 04/07/2025 4.0  3.5 - 5.2 g/dL Final    Bilirubin Total 04/07/2025 1.6 (H)  0.1 - 1.0 mg/dL Final    For infants and newborns, interpretation of results should be based   on gestational age, weight and in agreement with clinical   observations.    Premature Infant recommended reference ranges:   0-24 hours:  <8.0 mg/dL   24-48 hours: <12.0 mg/dL   3-5 days:    <15.0 mg/dL   6-29 days:   <15.0 mg/dL    ALP 04/07/2025 72  40 - 150 unit/L Final    AST 04/07/2025 17  11 - 45 unit/L Final    ALT 04/07/2025 16  10 - 44 unit/L Final    Anion Gap 04/07/2025 8  8 - 16 mmol/L Final    eGFR 04/07/2025 >60  >60 mL/min/1.73/m2 Final    Estimated GFR calculated using the CKD-EPI creatinine (2021) equation.    Hemoglobin A1c 04/07/2025 5.3  4.0 - 5.6 % Final    ADA Screening Guidelines:  5.7-6.4%  Consistent with prediabetes  >=6.5%  Consistent with diabetes    High levels of fetal hemoglobin interfere with the HbA1C  assay. Heterozygous hemoglobin variants (HbS, HgC, etc)do  not significantly interfere with this assay.   However, presence of multiple variants may affect accuracy.    Estimated Average Glucose 04/07/2025 105  68 - 131 mg/dL  "Final    TSH 04/07/2025 2.737  0.400 - 4.000 uIU/mL Final    Free T4 04/07/2025 0.97  0.71 - 1.51 ng/dL Final    T3, Total 04/07/2025 68  60 - 180 ng/dL Final       Vitals:    04/10/25 1414   BP: 118/76   BP Location: Left arm   Patient Position: Sitting   Pulse: 90   Temp: 97.9 °F (36.6 °C)   TempSrc: Temporal   SpO2: 98%   Weight: 91.7 kg (202 lb 0.8 oz)   Height: 5' 1" (1.549 m)         Diagnoses this Encounter:         ICD-10-CM ICD-9-CM   1. Type 2 diabetes mellitus without complication, without long-term current use of insulin  E11.9 250.00   2. Hypertension associated with type 2 diabetes mellitus  E11.59 250.80    I15.2 401.9   3. Hyperlipidemia associated with type 2 diabetes mellitus  E11.69 250.80    E78.5 272.4   4. Metabolic dysfunction-associated steatotic liver disease (MASLD)  K76.0 571.8   5. Class 2 severe obesity due to excess calories with serious comorbidity and body mass index (BMI) of 38.0 to 38.9 in adult  E66.812 278.01    E66.01 V85.38    Z68.38    6. Acquired hypothyroidism  E03.9 244.9   7. KELLY on CPAP  G47.33 327.23   8. Osteopenia of left femoral neck  M85.852 733.90       Orders Placed This Encounter    CBC Auto Differential    Comprehensive Metabolic Panel    Hemoglobin A1C    Lipid Panel    TSH    Microalbumin/Creatinine Ratio, Urine    MOUNJARO 7.5 mg/0.5 mL PnIj        Follow up in about 11 weeks (around 6/26/2025) for fasting labs, urine and WELLNESS EXAM.     Patient's Medications   New Prescriptions    MOUNJARO 7.5 MG/0.5 ML PNIJ    Inject 7.5 mg into the skin every 7 days.   Previous Medications    AMLODIPINE (NORVASC) 5 MG TABLET    Take 1 tablet (5 mg total) by mouth once daily.    ASPIRIN (ECOTRIN) 81 MG EC TABLET    Take 81 mg by mouth once daily.    CALCIUM-VITAMIN D 600 MG-10 MCG (400 UNIT) TAB    Take 1 tablet by mouth 2 (two) times a day.    ESTRADIOL (ESTRACE) 0.01 % (0.1 MG/GRAM) VAGINAL CREAM    Place 1 g vaginally twice a week.    HYDROCHLOROTHIAZIDE 12.5 MG TAB    " Take 1 tablet (12.5 mg total) by mouth once daily.    LEVOTHYROXINE (SYNTHROID) 100 MCG TABLET    Take 1 tablet (100 mcg total) by mouth before breakfast.    LISINOPRIL (PRINIVIL,ZESTRIL) 20 MG TABLET    Take 1 tablet (20 mg total) by mouth once daily.    ROSUVASTATIN (CRESTOR) 10 MG TABLET    Take 1 tablet (10 mg total) by mouth once daily.   Modified Medications    No medications on file   Discontinued Medications    SEMAGLUTIDE (OZEMPIC) 1 MG/DOSE (4 MG/3 ML)    Inject 1 mg into the skin every 7 days.         Review of Systems   HENT: Negative.     Respiratory: Negative.     Cardiovascular: Negative.    Gastrointestinal: Negative.          Objective:        Physical Exam  Constitutional:       General: She is not in acute distress.     Appearance: Normal appearance. She is obese. She is not toxic-appearing or diaphoretic.      Comments: Body mass index is 38.18 kg/m².       Cardiovascular:      Rate and Rhythm: Normal rate and regular rhythm.      Heart sounds: Normal heart sounds.   Pulmonary:      Effort: Pulmonary effort is normal. No respiratory distress.      Breath sounds: Normal breath sounds.   Abdominal:      General: There is no distension.   Neurological:      Mental Status: She is alert and oriented to person, place, and time.   Psychiatric:         Mood and Affect: Mood normal.         Behavior: Behavior normal.             Past Medical History:   Diagnosis Date    H/O mammogram 2016    has done at [facility]    Hyperlipidemia 2017    Hypertension     Hypothyroidism     IFG (impaired fasting glucose)     NAFLD (nonalcoholic fatty liver disease)     KELLY (obstructive sleep apnea)     on CPAP    KELLY on CPAP     Screening for colorectal cancer 2016    Type 2 diabetes mellitus without complication, without long-term current use of insulin 2019       Past Surgical History:   Procedure Laterality Date     SECTION      COLONOSCOPY N/A 2016    Procedure: COLONOSCOPY-Miralax split  prep ;  Surgeon: Cali Oliveira Jr., MD;  Location: [facility] ENDO;  Service: Endoscopy;  Laterality: N/A;    COLONOSCOPY N/A 2022    Procedure: COLONOSCOPY;  Surgeon: Eli Kong MD;  Location: [facility] ENDO;  Service: Endoscopy;  Laterality: N/A;    DILATION AND CURETTAGE OF UTERUS         Family History   Problem Relation Name Age of Onset    Dementia Mother      Parkinsonism Mother          PASSED AGE 78    Hypertension Father      Cancer Father          KIDNEY PASSED AGE 70    Heart disease Father  50        HEART ATTACK    Diabetes Father      Hypertension Sister      Diabetes Brother      Hypertension Brother      Diabetes Sister      Hypertension Sister      Hypertension Sister      Hypertension Brother      No Known Problems Daughter      No Known Problems Son      No Known Problems Son      Breast cancer Neg Hx      Colon cancer Neg Hx      Ovarian cancer Neg Hx         Social History     Socioeconomic History    Marital status:    Tobacco Use    Smoking status: Former     Current packs/day: 0.00     Average packs/day: 0.1 packs/day for 8.0 years (0.8 ttl pk-yrs)     Types: Cigarettes     Start date:      Quit date:      Years since quittin.2     Passive exposure: Never    Smokeless tobacco: Never    Tobacco comments:     socially only for around 20 years but not every day   Substance and Sexual Activity    Alcohol use: Yes     Comment: rare    Drug use: No    Sexual activity: Not Currently     Birth control/protection: Post-menopausal

## 2025-04-10 ENCOUNTER — OFFICE VISIT (OUTPATIENT)
Dept: FAMILY MEDICINE | Facility: CLINIC | Age: 65
End: 2025-04-10
Payer: COMMERCIAL

## 2025-04-10 VITALS
SYSTOLIC BLOOD PRESSURE: 118 MMHG | HEIGHT: 61 IN | WEIGHT: 202.06 LBS | BODY MASS INDEX: 38.15 KG/M2 | DIASTOLIC BLOOD PRESSURE: 76 MMHG | TEMPERATURE: 98 F | HEART RATE: 90 BPM | OXYGEN SATURATION: 98 %

## 2025-04-10 DIAGNOSIS — E11.9 TYPE 2 DIABETES MELLITUS WITHOUT COMPLICATION, WITHOUT LONG-TERM CURRENT USE OF INSULIN: Primary | ICD-10-CM

## 2025-04-10 DIAGNOSIS — E66.01 CLASS 2 SEVERE OBESITY DUE TO EXCESS CALORIES WITH SERIOUS COMORBIDITY AND BODY MASS INDEX (BMI) OF 38.0 TO 38.9 IN ADULT: ICD-10-CM

## 2025-04-10 DIAGNOSIS — E66.812 CLASS 2 SEVERE OBESITY DUE TO EXCESS CALORIES WITH SERIOUS COMORBIDITY AND BODY MASS INDEX (BMI) OF 38.0 TO 38.9 IN ADULT: ICD-10-CM

## 2025-04-10 DIAGNOSIS — E78.5 HYPERLIPIDEMIA ASSOCIATED WITH TYPE 2 DIABETES MELLITUS: ICD-10-CM

## 2025-04-10 DIAGNOSIS — E03.9 ACQUIRED HYPOTHYROIDISM: ICD-10-CM

## 2025-04-10 DIAGNOSIS — G47.33 OSA ON CPAP: ICD-10-CM

## 2025-04-10 DIAGNOSIS — E11.59 HYPERTENSION ASSOCIATED WITH TYPE 2 DIABETES MELLITUS: ICD-10-CM

## 2025-04-10 DIAGNOSIS — E11.69 HYPERLIPIDEMIA ASSOCIATED WITH TYPE 2 DIABETES MELLITUS: ICD-10-CM

## 2025-04-10 DIAGNOSIS — K76.0 METABOLIC DYSFUNCTION-ASSOCIATED STEATOTIC LIVER DISEASE (MASLD): ICD-10-CM

## 2025-04-10 DIAGNOSIS — M85.852 OSTEOPENIA OF LEFT FEMORAL NECK: ICD-10-CM

## 2025-04-10 DIAGNOSIS — I15.2 HYPERTENSION ASSOCIATED WITH TYPE 2 DIABETES MELLITUS: ICD-10-CM

## 2025-04-10 PROCEDURE — 4010F ACE/ARB THERAPY RXD/TAKEN: CPT | Mod: CPTII,S$GLB,, | Performed by: NURSE PRACTITIONER

## 2025-04-10 PROCEDURE — 99214 OFFICE O/P EST MOD 30 MIN: CPT | Mod: S$GLB,,, | Performed by: NURSE PRACTITIONER

## 2025-04-10 PROCEDURE — 1126F AMNT PAIN NOTED NONE PRSNT: CPT | Mod: CPTII,S$GLB,, | Performed by: NURSE PRACTITIONER

## 2025-04-10 PROCEDURE — G2211 COMPLEX E/M VISIT ADD ON: HCPCS | Mod: S$GLB,,, | Performed by: NURSE PRACTITIONER

## 2025-04-10 PROCEDURE — 1101F PT FALLS ASSESS-DOCD LE1/YR: CPT | Mod: CPTII,S$GLB,, | Performed by: NURSE PRACTITIONER

## 2025-04-10 PROCEDURE — 3288F FALL RISK ASSESSMENT DOCD: CPT | Mod: CPTII,S$GLB,, | Performed by: NURSE PRACTITIONER

## 2025-04-10 PROCEDURE — 3074F SYST BP LT 130 MM HG: CPT | Mod: CPTII,S$GLB,, | Performed by: NURSE PRACTITIONER

## 2025-04-10 PROCEDURE — 1160F RVW MEDS BY RX/DR IN RCRD: CPT | Mod: CPTII,S$GLB,, | Performed by: NURSE PRACTITIONER

## 2025-04-10 PROCEDURE — 3008F BODY MASS INDEX DOCD: CPT | Mod: CPTII,S$GLB,, | Performed by: NURSE PRACTITIONER

## 2025-04-10 PROCEDURE — 1159F MED LIST DOCD IN RCRD: CPT | Mod: CPTII,S$GLB,, | Performed by: NURSE PRACTITIONER

## 2025-04-10 PROCEDURE — 99999 PR PBB SHADOW E&M-EST. PATIENT-LVL IV: CPT | Mod: PBBFAC,,, | Performed by: NURSE PRACTITIONER

## 2025-04-10 PROCEDURE — 3044F HG A1C LEVEL LT 7.0%: CPT | Mod: CPTII,S$GLB,, | Performed by: NURSE PRACTITIONER

## 2025-04-10 PROCEDURE — 3078F DIAST BP <80 MM HG: CPT | Mod: CPTII,S$GLB,, | Performed by: NURSE PRACTITIONER

## 2025-04-10 RX ORDER — TIRZEPATIDE 7.5 MG/.5ML
7.5 INJECTION, SOLUTION SUBCUTANEOUS
Qty: 2 ML | Refills: 2 | Status: SHIPPED | OUTPATIENT
Start: 2025-04-10

## 2025-04-29 ENCOUNTER — PATIENT MESSAGE (OUTPATIENT)
Dept: FAMILY MEDICINE | Facility: CLINIC | Age: 65
End: 2025-04-29
Payer: COMMERCIAL

## 2025-04-29 DIAGNOSIS — E11.9 TYPE 2 DIABETES MELLITUS WITHOUT COMPLICATION, WITHOUT LONG-TERM CURRENT USE OF INSULIN: ICD-10-CM

## 2025-04-29 RX ORDER — TIRZEPATIDE 7.5 MG/.5ML
7.5 INJECTION, SOLUTION SUBCUTANEOUS
Qty: 6 ML | Refills: 0 | Status: SHIPPED | OUTPATIENT
Start: 2025-04-29

## 2025-06-26 ENCOUNTER — OFFICE VISIT (OUTPATIENT)
Dept: FAMILY MEDICINE | Facility: CLINIC | Age: 65
End: 2025-06-26
Payer: COMMERCIAL

## 2025-06-26 VITALS
BODY MASS INDEX: 36.73 KG/M2 | HEART RATE: 89 BPM | TEMPERATURE: 98 F | HEIGHT: 61 IN | SYSTOLIC BLOOD PRESSURE: 110 MMHG | DIASTOLIC BLOOD PRESSURE: 70 MMHG | OXYGEN SATURATION: 98 % | WEIGHT: 194.56 LBS | RESPIRATION RATE: 20 BRPM

## 2025-06-26 DIAGNOSIS — E11.9 TYPE 2 DIABETES MELLITUS WITHOUT COMPLICATION, WITHOUT LONG-TERM CURRENT USE OF INSULIN: ICD-10-CM

## 2025-06-26 DIAGNOSIS — E78.5 HYPERLIPIDEMIA ASSOCIATED WITH TYPE 2 DIABETES MELLITUS: ICD-10-CM

## 2025-06-26 DIAGNOSIS — E03.9 ACQUIRED HYPOTHYROIDISM: ICD-10-CM

## 2025-06-26 DIAGNOSIS — Z12.11 COLON CANCER SCREENING: ICD-10-CM

## 2025-06-26 DIAGNOSIS — Z00.00 ANNUAL PHYSICAL EXAM: Primary | ICD-10-CM

## 2025-06-26 DIAGNOSIS — K76.0 METABOLIC DYSFUNCTION-ASSOCIATED STEATOTIC LIVER DISEASE (MASLD): ICD-10-CM

## 2025-06-26 DIAGNOSIS — E11.59 HYPERTENSION ASSOCIATED WITH TYPE 2 DIABETES MELLITUS: ICD-10-CM

## 2025-06-26 DIAGNOSIS — Z86.0100 HISTORY OF COLON POLYPS: ICD-10-CM

## 2025-06-26 DIAGNOSIS — E66.812 CLASS 2 SEVERE OBESITY DUE TO EXCESS CALORIES WITH SERIOUS COMORBIDITY AND BODY MASS INDEX (BMI) OF 36.0 TO 36.9 IN ADULT: ICD-10-CM

## 2025-06-26 DIAGNOSIS — I15.2 HYPERTENSION ASSOCIATED WITH TYPE 2 DIABETES MELLITUS: ICD-10-CM

## 2025-06-26 DIAGNOSIS — E11.69 HYPERLIPIDEMIA ASSOCIATED WITH TYPE 2 DIABETES MELLITUS: ICD-10-CM

## 2025-06-26 DIAGNOSIS — E66.01 CLASS 2 SEVERE OBESITY DUE TO EXCESS CALORIES WITH SERIOUS COMORBIDITY AND BODY MASS INDEX (BMI) OF 36.0 TO 36.9 IN ADULT: ICD-10-CM

## 2025-06-26 DIAGNOSIS — M85.852 OSTEOPENIA OF LEFT FEMORAL NECK: ICD-10-CM

## 2025-06-26 DIAGNOSIS — G47.33 OSA ON CPAP: ICD-10-CM

## 2025-06-26 PROCEDURE — 3078F DIAST BP <80 MM HG: CPT | Mod: CPTII,S$GLB,, | Performed by: NURSE PRACTITIONER

## 2025-06-26 PROCEDURE — 3074F SYST BP LT 130 MM HG: CPT | Mod: CPTII,S$GLB,, | Performed by: NURSE PRACTITIONER

## 2025-06-26 PROCEDURE — 1160F RVW MEDS BY RX/DR IN RCRD: CPT | Mod: CPTII,S$GLB,, | Performed by: NURSE PRACTITIONER

## 2025-06-26 PROCEDURE — 1101F PT FALLS ASSESS-DOCD LE1/YR: CPT | Mod: CPTII,S$GLB,, | Performed by: NURSE PRACTITIONER

## 2025-06-26 PROCEDURE — 3066F NEPHROPATHY DOC TX: CPT | Mod: CPTII,S$GLB,, | Performed by: NURSE PRACTITIONER

## 2025-06-26 PROCEDURE — 1126F AMNT PAIN NOTED NONE PRSNT: CPT | Mod: CPTII,S$GLB,, | Performed by: NURSE PRACTITIONER

## 2025-06-26 PROCEDURE — 99397 PER PM REEVAL EST PAT 65+ YR: CPT | Mod: S$GLB,,, | Performed by: NURSE PRACTITIONER

## 2025-06-26 PROCEDURE — 3061F NEG MICROALBUMINURIA REV: CPT | Mod: CPTII,S$GLB,, | Performed by: NURSE PRACTITIONER

## 2025-06-26 PROCEDURE — 4010F ACE/ARB THERAPY RXD/TAKEN: CPT | Mod: CPTII,S$GLB,, | Performed by: NURSE PRACTITIONER

## 2025-06-26 PROCEDURE — 3044F HG A1C LEVEL LT 7.0%: CPT | Mod: CPTII,S$GLB,, | Performed by: NURSE PRACTITIONER

## 2025-06-26 PROCEDURE — 1159F MED LIST DOCD IN RCRD: CPT | Mod: CPTII,S$GLB,, | Performed by: NURSE PRACTITIONER

## 2025-06-26 PROCEDURE — 3288F FALL RISK ASSESSMENT DOCD: CPT | Mod: CPTII,S$GLB,, | Performed by: NURSE PRACTITIONER

## 2025-06-26 PROCEDURE — 3008F BODY MASS INDEX DOCD: CPT | Mod: CPTII,S$GLB,, | Performed by: NURSE PRACTITIONER

## 2025-06-26 PROCEDURE — 99999 PR PBB SHADOW E&M-EST. PATIENT-LVL IV: CPT | Mod: PBBFAC,,, | Performed by: NURSE PRACTITIONER

## 2025-06-26 NOTE — PROGRESS NOTES
"Subjective:       Patient ID: Digna Bhatia is a 64 y.o. female.    Chief Complaint: Annual Exam        HPI WITH ASSESSMENT AND PLAN OF CARE:        Patient is a 64-year-old white female with Type 2 Diabetes definitively diagnosed in August 2019 but Prediabetic since October 2018, hypertension, hyperlipidemia, hypothyroidism, fatty liver disease noted on ultrasound May 2017 with elevated liver enzymes, Osteopenia, History of Colon Polyps and obstructive sleep apnea with CPAP use that is here today for ANNUAL physical exam with fasting lab results.          TYPE 2 DIABETES  Diagnosed diabetic 5/21/2020 labs with  and HgbA1C 6.7%  Was on Metformin in past but changed to Ozempic for CVD, kidney and fatty liver benefits.  July 2024: Started Ozempic 0.5 mg injection weekly (started per patient request)(208 lbs, BMI 39.3)  1/16/2025: Increase to Ozempic 1 mg injection weekly (198 pounds, BMI 37.5)  4/10/2025: STOP Ozempic 1 mg weekly and CHANGE to Mounjaro 7.5 mg weekly (patient request)(202 lbs, BMI 38.2  6/26/2025:   On Mounjaro 7.5 mg weekly (194 lbs, BMI 36.8)   and A1C 5.2%  Stable.  Recheck in 6 months             Hyperlipidemia   controlled on Rosuvastatin 10 mg daily.   LDL 59.0  Recheck in 1 year             Hypertension  Currently controlled on present medications, amlodipine 5 mg daily, Lisinopril 20 daily, and HCTZ 12.5 mg daily   /70 (BP Location: Left arm, Patient Position: Sitting)   Pulse 89   Temp 98.2 °F (36.8 °C) (Temporal)   Resp 20   Ht 5' 1" (1.549 m)   Wt 88.3 kg (194 lb 8.9 oz)   LMP  (LMP Unknown)   SpO2 98%   BMI 36.76 kg/m²   Recheck in 3 months.        Hypothyroidism   Currently taking Levothyroxine 100 mcg daily  Levels controlled  Recheck in 6 months.             Obesity  Body mass index is 36.76 kg/m².  Working on lifestyle modifications.           Fatty Liver Disease/Gilbert's syndrome  Seen on abdominal ultrasound 8/16/2017  Evaluated by GI Dr. Oliveira in " 2017  Chronic elevation bilirubin (Gilbert's Syndrome)  AST/ALT within range               Obstructive sleep apnea   Intermittent use of CPAP machine.       Osteopenia   DEXA scan 1/22/24:  Lumbar spine normal bone density.  Left femoral neck osteopenia.  The left femoral neck bone mineral density is T score of -1.8.   Taking Calcium with Vitamin D supplement  Stable.  Recheck in January 2027      History of Colon Polyps  Colonoscopy 8/5/2022:  5 polyps - tubular adenomas  Repeat in 3 years - August 2025 - ordered      Wellness Labs:  CBC WNL  CMP WNL  Cholesterol excellent  TSH WNL      Health Maintenance:  Diabetic foot exam done  Diabetic eye exam scheduled  Colonoscopy ordered    Lab Visit on 06/23/2025   Component Date Value Ref Range Status    Sodium 06/23/2025 139  136 - 145 mmol/L Final    Potassium 06/23/2025 3.7  3.5 - 5.1 mmol/L Final    Chloride 06/23/2025 107  95 - 110 mmol/L Final    CO2 06/23/2025 25  23 - 29 mmol/L Final    Glucose 06/23/2025 101  70 - 110 mg/dL Final    BUN 06/23/2025 19  8 - 23 mg/dL Final    Creatinine 06/23/2025 0.9  0.5 - 1.4 mg/dL Final    Calcium 06/23/2025 9.5  8.7 - 10.5 mg/dL Final    Protein Total 06/23/2025 7.2  6.0 - 8.4 gm/dL Final    Albumin 06/23/2025 4.1  3.5 - 5.2 g/dL Final    Bilirubin Total 06/23/2025 1.9 (H)  0.1 - 1.0 mg/dL Final    For infants and newborns, interpretation of results should be based   on gestational age, weight and in agreement with clinical   observations.    Premature Infant recommended reference ranges:   0-24 hours:  <8.0 mg/dL   24-48 hours: <12.0 mg/dL   3-5 days:    <15.0 mg/dL   6-29 days:   <15.0 mg/dL    ALP 06/23/2025 77  40 - 150 unit/L Final    AST 06/23/2025 17  11 - 45 unit/L Final    ALT 06/23/2025 17  10 - 44 unit/L Final    Anion Gap 06/23/2025 7 (L)  8 - 16 mmol/L Final    eGFR 06/23/2025 >60  >60 mL/min/1.73/m2 Final    Estimated GFR calculated using the CKD-EPI creatinine (2021) equation.    Hemoglobin A1c 06/23/2025 5.2   4.0 - 5.6 % Final    ADA Screening Guidelines:  5.7-6.4%  Consistent with prediabetes  >=6.5%  Consistent with diabetes    High levels of fetal hemoglobin interfere with the HbA1C  assay. Heterozygous hemoglobin variants (HbS, HgC, etc)do  not significantly interfere with this assay.   However, presence of multiple variants may affect accuracy.    Estimated Average Glucose 06/23/2025 103  68 - 131 mg/dL Final    Cholesterol Total 06/23/2025 132  120 - 199 mg/dL Final    The National Cholesterol Education Program (NCEP) has set the  following guidelines (reference ranges) for Cholesterol:  Optimal.....................<200 mg/dL  Borderline High.............200-239 mg/dL  High........................> or = 240 mg/dL    Triglyceride 06/23/2025 85  30 - 150 mg/dL Final    The National Cholesterol Education Program (NCEP) has set the  following guidelines (reference values) for triglycerides:  Normal......................<150 mg/dL  Borderline High.............150-199 mg/dL  High........................200-499 mg/dL    HDL Cholesterol 06/23/2025 56  40 - 75 mg/dL Final    The National Cholesterol Education Program (NCEP) has set the   following guidelines (reference values) for HDL Cholesterol:   Low...............<40 mg/dL   Optimal...........>60 mg/dL    LDL Cholesterol 06/23/2025 59.0 (L)  63.0 - 159.0 mg/dL Final    The National Cholesterol Education Program (NCEP) has set the  following guidelines (reference values) for LDL Cholesterol:  Optimal.......................<130 mg/dL  Borderline High...............130-159 mg/dL  High..........................160-189 mg/dL  Very High.....................>190 mg/dL  LDL calculated using the Friedewald equation.    HDL/Cholesterol Ratio 06/23/2025 42.4  20.0 - 50.0 % Final    Cholesterol/HDL Ratio 06/23/2025 2.4  2.0 - 5.0 Final    Non HDL Cholesterol 06/23/2025 76  mg/dL Final    Risk category and Non-HDL cholesterol goals:  Coronary heart disease (CHD)or equivalent  (10-year risk of CHD >20%):  Non-HDL cholesterol goal     <130 mg/dL  Two or more CHD risk factors and 10-year risk of CHD <= 20%:  Non-HDL cholesterol goal     <160 mg/dL  0 to 1 CHD risk factor:  Non-HDL cholesterol goal     <190 mg/dL    TSH 06/23/2025 0.542  0.400 - 4.000 uIU/mL Final    Urine Microalbumin 06/23/2025 7.0    ug/mL Final    Urine Creatinine 06/23/2025 196.0  15.0 - 325.0 mg/dL Final    Microalbumin/Creatinine Ratio Urine 06/23/2025 3.6  <=30.0 ug/mg Final    WBC 06/23/2025 6.73  3.90 - 12.70 K/uL Final    RBC 06/23/2025 4.53  4.00 - 5.40 M/uL Final    HGB 06/23/2025 13.3  12.0 - 16.0 gm/dL Final    HCT 06/23/2025 39.3  37.0 - 48.5 % Final    MCV 06/23/2025 87  82 - 98 fL Final    MCH 06/23/2025 29.4  27.0 - 31.0 pg Final    MCHC 06/23/2025 33.8  32.0 - 36.0 g/dL Final    RDW 06/23/2025 13.3  11.5 - 14.5 % Final    Platelet Count 06/23/2025 258  150 - 450 K/uL Final    MPV 06/23/2025 10.0  9.2 - 12.9 fL Final    Nucleated RBC 06/23/2025 0  <=0 /100 WBC Final    Neut % 06/23/2025 59.5  38 - 73 % Final    Lymph % 06/23/2025 22.4  18 - 48 % Final    Mono % 06/23/2025 11.0  4 - 15 % Final    Eos % 06/23/2025 4.6  <=8 % Final    Basophil % 06/23/2025 2.1 (H)  <=1.9 % Final    Imm Grans % 06/23/2025 0.4  0.0 - 0.5 % Final    Neut # 06/23/2025 4.00  1.8 - 7.7 K/uL Final    Lymph # 06/23/2025 1.51  1 - 4.8 K/uL Final    Mono # 06/23/2025 0.74  0.3 - 1 K/uL Final    Eos # 06/23/2025 0.31  <=0.5 K/uL Final    Baso # 06/23/2025 0.14  <=0.2 K/uL Final    Imm Grans # 06/23/2025 0.03  0.00 - 0.04 K/uL Final    Mild elevation in immature granulocytes is non specific and can be seen in a variety of conditions including stress response, acute inflammation, trauma and pregnancy. Correlation with other laboratory and clinical findings is essential.       Vitals:    06/26/25 1345   BP: 110/70   BP Location: Left arm   Patient Position: Sitting   Pulse: 89   Resp: 20   Temp: 98.2 °F (36.8 °C)   TempSrc: Temporal  "  SpO2: 98%   Weight: 88.3 kg (194 lb 8.9 oz)   Height: 5' 1" (1.549 m)         Diagnoses this Encounter:         ICD-10-CM ICD-9-CM   1. Annual physical exam  Z00.00 V70.0   2. Type 2 diabetes mellitus without complication, without long-term current use of insulin  E11.9 250.00   3. Hypertension associated with type 2 diabetes mellitus  E11.59 250.80    I15.2 401.9   4. Hyperlipidemia associated with type 2 diabetes mellitus  E11.69 250.80    E78.5 272.4   5. Acquired hypothyroidism  E03.9 244.9   6. Class 2 severe obesity due to excess calories with serious comorbidity and body mass index (BMI) of 36.0 to 36.9 in adult  E66.812 278.01    E66.01 V85.36    Z68.36    7. Metabolic dysfunction-associated steatotic liver disease (MASLD)  K76.0 571.8   8. Osteopenia of left femoral neck  M85.852 733.90   9. KELLY on CPAP  G47.33 327.23   10. History of colon polyps  Z86.0100 V12.72   11. Colon cancer screening  Z12.11 V76.51       Orders Placed This Encounter    Comprehensive Metabolic Panel    Hemoglobin A1C    TSH    T4, Free    Ambulatory referral/consult to Endo Procedure         Follow up in about 6 months (around 12/26/2025) for fasting labs and follow up.     Patient's Medications   New Prescriptions    No medications on file   Previous Medications    AMLODIPINE (NORVASC) 5 MG TABLET    Take 1 tablet (5 mg total) by mouth once daily.    ASPIRIN (ECOTRIN) 81 MG EC TABLET    Take 81 mg by mouth once daily.    CALCIUM-VITAMIN D 600 MG-10 MCG (400 UNIT) TAB    Take 1 tablet by mouth 2 (two) times a day.    ESTRADIOL (ESTRACE) 0.01 % (0.1 MG/GRAM) VAGINAL CREAM    Place 1 g vaginally twice a week.    HYDROCHLOROTHIAZIDE 12.5 MG TAB    Take 1 tablet (12.5 mg total) by mouth once daily.    LEVOTHYROXINE (SYNTHROID) 100 MCG TABLET    Take 1 tablet (100 mcg total) by mouth before breakfast.    LISINOPRIL (PRINIVIL,ZESTRIL) 20 MG TABLET    Take 1 tablet (20 mg total) by mouth once daily.    MOUNJARO 7.5 MG/0.5 ML " PNIJ    Inject 7.5 mg into the skin every 7 days.    ROSUVASTATIN (CRESTOR) 10 MG TABLET    Take 1 tablet (10 mg total) by mouth once daily.   Modified Medications    No medications on file   Discontinued Medications    No medications on file         Review of Systems   HENT: Negative.     Respiratory: Negative.     Cardiovascular: Negative.    Gastrointestinal: Negative.          Objective:        Physical Exam  Constitutional:       General: She is not in acute distress.     Appearance: She is well-developed. She is obese. She is not ill-appearing, toxic-appearing or diaphoretic.      Comments: + obesity. Body mass index is 36.76 kg/m².       HENT:      Head: Normocephalic and atraumatic.      Right Ear: External ear normal.      Left Ear: External ear normal.   Eyes:      General: No scleral icterus.        Right eye: No discharge.         Left eye: No discharge.      Extraocular Movements: Extraocular movements intact.      Conjunctiva/sclera: Conjunctivae normal.   Neck:      Thyroid: No thyromegaly.      Vascular: No JVD.      Trachea: No tracheal deviation.   Cardiovascular:      Rate and Rhythm: Normal rate and regular rhythm.      Pulses:           Dorsalis pedis pulses are 2+ on the right side and 2+ on the left side.      Heart sounds: Normal heart sounds. No murmur heard.  Pulmonary:      Effort: Pulmonary effort is normal. No respiratory distress.      Breath sounds: Normal breath sounds. No stridor. No wheezing or rales.   Abdominal:      General: There is no distension.   Musculoskeletal:         General: Normal range of motion.      Cervical back: Normal range of motion and neck supple.   Feet:      Right foot:      Protective Sensation: 7 sites tested.  7 sites sensed.      Skin integrity: No ulcer or skin breakdown.      Left foot:      Protective Sensation: 7 sites tested.  7 sites sensed.      Skin integrity: No ulcer or skin breakdown.   Lymphadenopathy:      Cervical: No cervical adenopathy.    Skin:     General: Skin is warm and dry.      Findings: No rash.   Neurological:      Mental Status: She is alert and oriented to person, place, and time.      Cranial Nerves: No cranial nerve deficit.      Coordination: Coordination normal.   Psychiatric:         Mood and Affect: Mood normal.         Behavior: Behavior normal.         Thought Content: Thought content normal.         Judgment: Judgment normal.             Past Medical History:   Diagnosis Date    H/O mammogram 2016    has done at [facility]    Hyperlipidemia 2017    Hypertension     Hypothyroidism     IFG (impaired fasting glucose)     NAFLD (nonalcoholic fatty liver disease)     KELLY (obstructive sleep apnea)     on CPAP    KELLY on CPAP     Screening for colorectal cancer 2016    Type 2 diabetes mellitus without complication, without long-term current use of insulin 2019       Past Surgical History:   Procedure Laterality Date     SECTION      COLONOSCOPY N/A 2016    Procedure: COLONOSCOPY-Miralax split prep ;  Surgeon: Cali Oliveira Jr., MD;  Location: [facility] ENDO;  Service: Endoscopy;  Laterality: N/A;    COLONOSCOPY N/A 2022    Procedure: COLONOSCOPY;  Surgeon: Eli Kong MD;  Location: [facility] ENDO;  Service: Endoscopy;  Laterality: N/A;    DILATION AND CURETTAGE OF UTERUS         Family History   Problem Relation Name Age of Onset    Dementia Mother      Parkinsonism Mother          PASSED AGE 78    Hypertension Father      Cancer Father          KIDNEY PASSED AGE 70    Heart disease Father  50        HEART ATTACK    Diabetes Father      Hypertension Sister      Diabetes Brother      Hypertension Brother      Diabetes Sister      Hypertension Sister      Hypertension Sister      Hypertension Brother      No Known Problems Daughter      No Known Problems Son      No Known Problems Son      Breast cancer Neg Hx      Colon cancer Neg Hx      Ovarian cancer Neg Hx         Social History     Socioeconomic  History    Marital status:    Tobacco Use    Smoking status: Former     Current packs/day: 0.00     Average packs/day: 0.1 packs/day for 8.0 years (0.8 ttl pk-yrs)     Types: Cigarettes     Start date:      Quit date:      Years since quittin.5     Passive exposure: Never    Smokeless tobacco: Never    Tobacco comments:     socially only for around 20 years but not every day   Substance and Sexual Activity    Alcohol use: Yes     Comment: rare    Drug use: No    Sexual activity: Not Currently     Birth control/protection: Post-menopausal

## 2025-06-27 ENCOUNTER — CLINICAL SUPPORT (OUTPATIENT)
Dept: ENDOSCOPY | Facility: HOSPITAL | Age: 65
End: 2025-06-27
Payer: COMMERCIAL

## 2025-06-27 VITALS — HEIGHT: 61 IN | BODY MASS INDEX: 36.63 KG/M2 | WEIGHT: 194 LBS

## 2025-06-27 DIAGNOSIS — Z12.11 COLON CANCER SCREENING: ICD-10-CM

## 2025-06-27 DIAGNOSIS — Z86.0100 HISTORY OF COLON POLYPS: ICD-10-CM

## 2025-06-27 NOTE — PLAN OF CARE
Contacted the patient to schedule an endoscopy procedure(s) Colonoscopy . Patient will like to get scheduled at the [facility]. Informed patient I do not schedule for that locations. Patient verbalized understanding. [facility] Phone number was provided.

## 2025-07-12 DIAGNOSIS — E11.9 TYPE 2 DIABETES MELLITUS WITHOUT COMPLICATION, WITHOUT LONG-TERM CURRENT USE OF INSULIN: ICD-10-CM

## 2025-07-12 DIAGNOSIS — I15.2 HYPERTENSION ASSOCIATED WITH TYPE 2 DIABETES MELLITUS: ICD-10-CM

## 2025-07-12 DIAGNOSIS — E11.59 HYPERTENSION ASSOCIATED WITH TYPE 2 DIABETES MELLITUS: ICD-10-CM

## 2025-07-12 DIAGNOSIS — E03.9 ACQUIRED HYPOTHYROIDISM: ICD-10-CM

## 2025-07-12 DIAGNOSIS — M85.852 OSTEOPENIA OF LEFT FEMORAL NECK: ICD-10-CM

## 2025-07-12 DIAGNOSIS — I10 ESSENTIAL HYPERTENSION: ICD-10-CM

## 2025-07-13 ENCOUNTER — PATIENT MESSAGE (OUTPATIENT)
Dept: FAMILY MEDICINE | Facility: CLINIC | Age: 65
End: 2025-07-13
Payer: COMMERCIAL

## 2025-07-14 RX ORDER — LEVOTHYROXINE SODIUM 100 UG/1
100 TABLET ORAL
Qty: 30 TABLET | Refills: 0 | Status: SHIPPED | OUTPATIENT
Start: 2025-07-14 | End: 2025-07-16 | Stop reason: SDUPTHER

## 2025-07-14 RX ORDER — TIRZEPATIDE 7.5 MG/.5ML
7.5 INJECTION, SOLUTION SUBCUTANEOUS
Qty: 6 ML | Refills: 0 | Status: SHIPPED | OUTPATIENT
Start: 2025-07-14

## 2025-07-14 RX ORDER — LISINOPRIL 20 MG/1
20 TABLET ORAL DAILY
Qty: 30 TABLET | Refills: 0 | Status: SHIPPED | OUTPATIENT
Start: 2025-07-14 | End: 2025-07-16 | Stop reason: SDUPTHER

## 2025-07-14 RX ORDER — MULTIVITAMIN
1 TABLET ORAL 2 TIMES DAILY
COMMUNITY
Start: 2025-07-14

## 2025-07-14 RX ORDER — HYDROCHLOROTHIAZIDE 12.5 MG/1
12.5 TABLET ORAL DAILY
Qty: 30 TABLET | Refills: 0 | Status: SHIPPED | OUTPATIENT
Start: 2025-07-14 | End: 2025-07-16 | Stop reason: SDUPTHER

## 2025-07-14 RX ORDER — ASPIRIN 81 MG/1
81 TABLET ORAL DAILY
Qty: 90 TABLET | Refills: 0 | Status: SHIPPED | OUTPATIENT
Start: 2025-07-14

## 2025-07-16 ENCOUNTER — TELEPHONE (OUTPATIENT)
Dept: FAMILY MEDICINE | Facility: CLINIC | Age: 65
End: 2025-07-16
Payer: COMMERCIAL

## 2025-07-16 DIAGNOSIS — E11.59 HYPERTENSION ASSOCIATED WITH TYPE 2 DIABETES MELLITUS: ICD-10-CM

## 2025-07-16 DIAGNOSIS — I15.2 HYPERTENSION ASSOCIATED WITH TYPE 2 DIABETES MELLITUS: ICD-10-CM

## 2025-07-16 DIAGNOSIS — E03.9 ACQUIRED HYPOTHYROIDISM: ICD-10-CM

## 2025-07-16 DIAGNOSIS — E11.69 HYPERLIPIDEMIA ASSOCIATED WITH TYPE 2 DIABETES MELLITUS: ICD-10-CM

## 2025-07-16 DIAGNOSIS — E78.5 HYPERLIPIDEMIA ASSOCIATED WITH TYPE 2 DIABETES MELLITUS: ICD-10-CM

## 2025-07-16 RX ORDER — AMLODIPINE BESYLATE 5 MG/1
5 TABLET ORAL DAILY
Qty: 90 TABLET | Refills: 3 | Status: SHIPPED | OUTPATIENT
Start: 2025-07-16

## 2025-07-16 RX ORDER — ROSUVASTATIN CALCIUM 10 MG/1
10 TABLET, COATED ORAL DAILY
Qty: 90 TABLET | Refills: 3 | Status: SHIPPED | OUTPATIENT
Start: 2025-07-16

## 2025-07-16 RX ORDER — LEVOTHYROXINE SODIUM 100 UG/1
100 TABLET ORAL
Qty: 90 TABLET | Refills: 3 | Status: SHIPPED | OUTPATIENT
Start: 2025-07-16

## 2025-07-16 RX ORDER — HYDROCHLOROTHIAZIDE 12.5 MG/1
12.5 TABLET ORAL DAILY
Qty: 90 TABLET | Refills: 3 | Status: SHIPPED | OUTPATIENT
Start: 2025-07-16

## 2025-07-16 RX ORDER — LISINOPRIL 20 MG/1
20 TABLET ORAL DAILY
Qty: 90 TABLET | Refills: 3 | Status: SHIPPED | OUTPATIENT
Start: 2025-07-16

## 2025-07-16 NOTE — TELEPHONE ENCOUNTER
Spoke to pt she explained that she made a mistake when requesting refills through [facility] instead of [facility] which resulted in a 30 day supply not the 90 day she usually receives. She also does NOT need Mounjaro or aspirin 81. Please resubmit refill request for the following medications only: HCTZ, lisinopril and levothyroxine, they have zero refills. Informed pt a message will be sent to APRIL Traylor. Pt verbalized understanding.           Copied from CRM #[id number]. Topic: General Inquiry - Patient Advice  >> Jul 16, 2025 12:14 PM Med Assistant Silvina wrote:  Type: Patient Call Back    Who called:Pt     What is the request in detail:discuss request for medication refills     Can the clinic reply by [facility]?  no  Would the patient rather a call back or a response via [facility]? Callback     Best call back number:Telephone Information:  Mobile          [phone]       Additional Information:

## 2025-07-18 ENCOUNTER — PATIENT MESSAGE (OUTPATIENT)
Dept: FAMILY MEDICINE | Facility: CLINIC | Age: 65
End: 2025-07-18
Payer: COMMERCIAL

## 2025-07-29 ENCOUNTER — PATIENT MESSAGE (OUTPATIENT)
Dept: FAMILY MEDICINE | Facility: CLINIC | Age: 65
End: 2025-07-29
Payer: COMMERCIAL

## 2025-07-29 LAB
LEFT EYE DM RETINOPATHY: NEGATIVE
RIGHT EYE DM RETINOPATHY: NEGATIVE

## 2025-08-01 ENCOUNTER — PATIENT OUTREACH (OUTPATIENT)
Dept: ADMINISTRATIVE | Facility: HOSPITAL | Age: 65
End: 2025-08-01
Payer: COMMERCIAL

## 2025-08-01 NOTE — PROGRESS NOTES
Health Maintenance Topic(s) Outreach Outcomes & Actions Taken:    Eye Exam - Outreach Outcomes & Actions Taken  : Diabetic Eye External Records Uploaded, Care Team & History Updated if Applicable

## 2025-08-06 ENCOUNTER — PATIENT MESSAGE (OUTPATIENT)
Dept: FAMILY MEDICINE | Facility: CLINIC | Age: 65
End: 2025-08-06
Payer: COMMERCIAL

## 2025-08-08 ENCOUNTER — TELEPHONE (OUTPATIENT)
Dept: FAMILY MEDICINE | Facility: CLINIC | Age: 65
End: 2025-08-08
Payer: COMMERCIAL

## 2025-08-08 NOTE — TELEPHONE ENCOUNTER
Copied from CRM #[id number]. Topic: General Inquiry - Return Call  >> Aug 7, 2025  1:38 PM Que wrote:  Type:  Patient Returning Call    Who Called:pt  Who Left Message for Patient: Doris   Does the patient know what this is regarding?:   Would the patient rather a call back or a response via [facility]? Call   Best Call Back Number: [phone] (M)  Additional Information: